# Patient Record
Sex: MALE | Race: BLACK OR AFRICAN AMERICAN | NOT HISPANIC OR LATINO | URBAN - METROPOLITAN AREA
[De-identification: names, ages, dates, MRNs, and addresses within clinical notes are randomized per-mention and may not be internally consistent; named-entity substitution may affect disease eponyms.]

---

## 2018-09-05 ENCOUNTER — EMERGENCY (EMERGENCY)
Facility: HOSPITAL | Age: 28
LOS: 1 days | Discharge: HOME | End: 2018-09-05
Attending: EMERGENCY MEDICINE | Admitting: EMERGENCY MEDICINE
Payer: COMMERCIAL

## 2018-09-05 VITALS
SYSTOLIC BLOOD PRESSURE: 130 MMHG | HEART RATE: 118 BPM | WEIGHT: 190.04 LBS | TEMPERATURE: 98 F | DIASTOLIC BLOOD PRESSURE: 81 MMHG | RESPIRATION RATE: 16 BRPM | OXYGEN SATURATION: 100 %

## 2018-09-05 VITALS
DIASTOLIC BLOOD PRESSURE: 65 MMHG | RESPIRATION RATE: 16 BRPM | HEART RATE: 96 BPM | OXYGEN SATURATION: 100 % | SYSTOLIC BLOOD PRESSURE: 111 MMHG | TEMPERATURE: 98 F

## 2018-09-05 DIAGNOSIS — R00.0 TACHYCARDIA, UNSPECIFIED: ICD-10-CM

## 2018-09-05 DIAGNOSIS — M54.5 LOW BACK PAIN: ICD-10-CM
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
## 2018-09-05 DIAGNOSIS — L98.499 NON-PRESSURE CHRONIC ULCER OF SKIN OF OTHER SITES WITH UNSPECIFIED SEVERITY: ICD-10-CM

## 2018-09-05 PROCEDURE — 99284 EMERGENCY DEPT VISIT MOD MDM: CPT

## 2018-09-05 PROCEDURE — 72131 CT LUMBAR SPINE W/O DYE: CPT | Mod: 26

## 2018-09-05 PROCEDURE — 99284 EMERGENCY DEPT VISIT MOD MDM: CPT | Mod: 25

## 2018-09-05 PROCEDURE — 72131 CT LUMBAR SPINE W/O DYE: CPT

## 2018-09-05 RX ORDER — COLLAGENASE CLOSTRIDIUM HIST. 250 UNIT/G
1 OINTMENT (GRAM) TOPICAL ONCE
Qty: 0 | Refills: 0 | Status: COMPLETED | OUTPATIENT
Start: 2018-09-05 | End: 2018-09-05

## 2018-09-05 RX ORDER — OXYCODONE AND ACETAMINOPHEN 5; 325 MG/1; MG/1
3 TABLET ORAL ONCE
Qty: 0 | Refills: 0 | Status: DISCONTINUED | OUTPATIENT
Start: 2018-09-05 | End: 2018-09-05

## 2018-09-05 RX ADMIN — Medication 1 APPLICATION(S): at 03:54

## 2018-09-05 RX ADMIN — OXYCODONE AND ACETAMINOPHEN 3 TABLET(S): 5; 325 TABLET ORAL at 03:54

## 2018-09-05 RX ADMIN — OXYCODONE AND ACETAMINOPHEN 3 TABLET(S): 5; 325 TABLET ORAL at 04:24

## 2018-09-05 NOTE — ED ADULT NURSE NOTE - NSINTERVENTIONOPT_GEN_ALL_ED
Chair Alarms/Stretcher Alarms/Hourly Rounding/Move Toilet (commode/urinal) to patient using "arms reach"/Enhanced Supervision

## 2018-09-05 NOTE — ED PROVIDER NOTE - MEDICAL DECISION MAKING DETAILS
multiple ulcers requiring wound care eval ( has no wound care team ) contact precautions given maggots noted CT study lower spine multiple ulcers requiring wound care eval ( has no wound care team ) contact precautions given maggots noted CT study lower spine-> wounds cleansed ( no maggots noted collagenases dressings + doing well in ED and plan outpatient continued care multiple ulcers requiring wound care eval ( has no wound care team ) contact precautions given maggots noted CT study lower spine-> wounds cleansed ( no maggots noted collagenases dressings + doing well in ED and plan outpatient continued care ( ct neg for fx )

## 2018-09-05 NOTE — ED ADULT NURSE REASSESSMENT NOTE - NS ED NURSE REASSESS COMMENT FT1
Labs to be held until CT is read. Wound care provided to 5 reported pressure ulcers. Pt went and returned from CT. Awaiting results. Pt was medicated for pain prior and is now resting comfortably in stretcher.
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
## 2018-09-05 NOTE — ED ADULT TRIAGE NOTE - ARRIVAL INFO ADDITIONAL COMMENTS
Pt c/o back pain and multiple buttock wound dressings falling off his wounds after falling out of his wheelchair.
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
## 2018-09-05 NOTE — ED PROVIDER NOTE - CARE PLAN
Principal Discharge DX:	Fall, initial encounter  Secondary Diagnosis:	Ulcer of sacral region, stage 4
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
## 2018-09-05 NOTE — ED PROVIDER NOTE - PHYSICAL EXAMINATION
large ulcerations malodorous and weeping albeit somewhat cared for appearance at bilateral hips/ bilateral buttocks and sacral region + appreciate age indeterminate flap related tissue from prior Sx Patient transfers well chair to bed + [place] catheter in place

## 2018-09-05 NOTE — ED ADULT NURSE NOTE - NSIMPLEMENTINTERV_GEN_ALL_ED
Implemented All Fall Risk Interventions:  [place] to call system. Call bell, personal items and telephone within reach. Instruct patient to call for assistance. Room bathroom lighting operational. Non-slip footwear when patient is off stretcher. Physically safe environment: no spills, clutter or unnecessary equipment. Stretcher in lowest position, wheels locked, appropriate side rails in place. Provide visual cue, wrist band, yellow gown, etc. Monitor gait and stability. Monitor for mental status changes and reorient to person, place, and time. Review medications for side effects contributing to fall risk. Reinforce activity limits and safety measures with patient and family.

## 2018-10-01 ENCOUNTER — INPATIENT (INPATIENT)
Facility: HOSPITAL | Age: 28
LOS: 30 days | Discharge: LEFT BEFORE DISCHARGE | DRG: 570 | End: 2018-11-01
Attending: HOSPITALIST | Admitting: HOSPITALIST
Payer: MEDICAID

## 2018-10-01 VITALS
HEART RATE: 91 BPM | DIASTOLIC BLOOD PRESSURE: 78 MMHG | RESPIRATION RATE: 16 BRPM | SYSTOLIC BLOOD PRESSURE: 124 MMHG | OXYGEN SATURATION: 97 % | TEMPERATURE: 99 F

## 2018-10-01 RX ORDER — KETOROLAC TROMETHAMINE 30 MG/ML
30 SYRINGE (ML) INJECTION ONCE
Qty: 0 | Refills: 0 | Status: DISCONTINUED | OUTPATIENT
Start: 2018-10-01 | End: 2018-10-01

## 2018-10-01 RX ORDER — OXYCODONE AND ACETAMINOPHEN 5; 325 MG/1; MG/1
2 TABLET ORAL ONCE
Qty: 0 | Refills: 0 | Status: DISCONTINUED | OUTPATIENT
Start: 2018-10-01 | End: 2018-10-01

## 2018-10-01 RX ADMIN — OXYCODONE AND ACETAMINOPHEN 2 TABLET(S): 5; 325 TABLET ORAL at 23:10
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
## 2018-10-01 NOTE — ED ADULT NURSE NOTE - OBJECTIVE STATEMENT
Patient wants to be placed into nursing facility Patient wants to be placed into nursing facility    colostomy to LLQ

## 2018-10-01 NOTE — ED PROVIDER NOTE - MEDICAL DECISION MAKING DETAILS
pt has no acute medical emergency, came in for placement, refusing workup and sw consult.   see progress note pt has chronic ulcer, lost his placement in nursing home, no acute medical emergency, came in for placement  see progress notes

## 2018-10-01 NOTE — ED PROVIDER NOTE - CARE PLAN
Principal Discharge DX:	Homeless Principal Discharge DX:	Homeless  Secondary Diagnosis:	Malingering Principal Discharge DX:	Sacral ulcer  Secondary Diagnosis:	Pain

## 2018-10-01 NOTE — ED PROVIDER NOTE - PROGRESS NOTE DETAILS
patient refusing labs. spoke with nursing manager Radha. ordered po pain mgmt multiple attempts at working up patient, he is refusing. he is refusing care. nursing manager attempted to workup patient on 5 separate occasions, patient wants po narcotics. gave 2 percocets. he is still refusing labs and workup. offered sw consult, pt declined involved nurse manager Shabana. patient now agrees to ultrasound guided. drea labs. patient signedout to me by Dr. Romero at 1am. awaiting labs for admission.  labs unremarkable. patient stable for admission for NH placement. TAHMINA Richey MD patient refusing labs. spoke with nursing manager Radha. ordered po pain mgmt. declined  consult. multiple attempts at working up patient, he is refusing. he does not allow blood draw or iv placement offered sw consult, pt declined. involved nurse manager Shabana. patient now agrees to ultrasound guided. drea labs. will screen for infection. gave empiric po abx in case ulcer infection. pt requesting large dose of opiate meds. discussed with team. patient signedout to me by Dr. Romero. awaiting labs for admission.  labs unremarkable. patient stable for admission for NH placement. endorsed to admitting team.

## 2018-10-01 NOTE — ED PROVIDER NOTE - SKIN, MLM
Skin normal color for race, warm, dry and intact. No evidence of rash. Skin normal color for race, warm, dry. stage 4 sacral ulcer.

## 2018-10-02 DIAGNOSIS — Z93.3 COLOSTOMY STATUS: Chronic | ICD-10-CM

## 2018-10-02 DIAGNOSIS — Z93.3 COLOSTOMY STATUS: ICD-10-CM

## 2018-10-02 DIAGNOSIS — D64.89 OTHER SPECIFIED ANEMIAS: ICD-10-CM

## 2018-10-02 DIAGNOSIS — E43 UNSPECIFIED SEVERE PROTEIN-CALORIE MALNUTRITION: ICD-10-CM

## 2018-10-02 DIAGNOSIS — G82.20 PARAPLEGIA, UNSPECIFIED: ICD-10-CM

## 2018-10-02 DIAGNOSIS — Z59.0 HOMELESSNESS: ICD-10-CM

## 2018-10-02 DIAGNOSIS — R30.0 DYSURIA: ICD-10-CM

## 2018-10-02 DIAGNOSIS — L89.103 PRESSURE ULCER OF UNSPECIFIED PART OF BACK, STAGE 3: ICD-10-CM

## 2018-10-02 DIAGNOSIS — L89.893 PRESSURE ULCER OF OTHER SITE, STAGE 3: ICD-10-CM

## 2018-10-02 LAB
ALBUMIN SERPL ELPH-MCNC: 3.2 G/DL — LOW (ref 3.5–5)
ALP SERPL-CCNC: 107 U/L — SIGNIFICANT CHANGE UP (ref 40–120)
ALT FLD-CCNC: 15 U/L DA — SIGNIFICANT CHANGE UP (ref 10–60)
ANION GAP SERPL CALC-SCNC: 7 MMOL/L — SIGNIFICANT CHANGE UP (ref 5–17)
AST SERPL-CCNC: 11 U/L — SIGNIFICANT CHANGE UP (ref 10–40)
BILIRUB SERPL-MCNC: 0.3 MG/DL — SIGNIFICANT CHANGE UP (ref 0.2–1.2)
BUN SERPL-MCNC: 11 MG/DL — SIGNIFICANT CHANGE UP (ref 7–18)
CALCIUM SERPL-MCNC: 9.6 MG/DL — SIGNIFICANT CHANGE UP (ref 8.4–10.5)
CHLORIDE SERPL-SCNC: 103 MMOL/L — SIGNIFICANT CHANGE UP (ref 96–108)
CO2 SERPL-SCNC: 28 MMOL/L — SIGNIFICANT CHANGE UP (ref 22–31)
CREAT SERPL-MCNC: 0.67 MG/DL — SIGNIFICANT CHANGE UP (ref 0.5–1.3)
GLUCOSE SERPL-MCNC: 102 MG/DL — HIGH (ref 70–99)
HCT VFR BLD CALC: 35.2 % — LOW (ref 39–50)
HGB BLD-MCNC: 10.8 G/DL — LOW (ref 13–17)
MCHC RBC-ENTMCNC: 24 PG — LOW (ref 27–34)
MCHC RBC-ENTMCNC: 30.5 GM/DL — LOW (ref 32–36)
MCV RBC AUTO: 78.6 FL — LOW (ref 80–100)
PLATELET # BLD AUTO: 267 K/UL — SIGNIFICANT CHANGE UP (ref 150–400)
POTASSIUM SERPL-MCNC: 4.4 MMOL/L — SIGNIFICANT CHANGE UP (ref 3.5–5.3)
POTASSIUM SERPL-SCNC: 4.4 MMOL/L — SIGNIFICANT CHANGE UP (ref 3.5–5.3)
PROT SERPL-MCNC: 10 G/DL — HIGH (ref 6–8.3)
RBC # BLD: 4.48 M/UL — SIGNIFICANT CHANGE UP (ref 4.2–5.8)
RBC # FLD: 17.6 % — HIGH (ref 10.3–14.5)
SODIUM SERPL-SCNC: 138 MMOL/L — SIGNIFICANT CHANGE UP (ref 135–145)
WBC # BLD: 5.9 K/UL — SIGNIFICANT CHANGE UP (ref 3.8–10.5)
WBC # FLD AUTO: 5.9 K/UL — SIGNIFICANT CHANGE UP (ref 3.8–10.5)

## 2018-10-02 PROCEDURE — 99222 1ST HOSP IP/OBS MODERATE 55: CPT

## 2018-10-02 PROCEDURE — 99284 EMERGENCY DEPT VISIT MOD MDM: CPT

## 2018-10-02 RX ORDER — OXYCODONE HYDROCHLORIDE 5 MG/1
15 TABLET ORAL EVERY 8 HOURS
Qty: 0 | Refills: 0 | Status: DISCONTINUED | OUTPATIENT
Start: 2018-10-02 | End: 2018-10-02

## 2018-10-02 RX ORDER — ONDANSETRON 8 MG/1
4 TABLET, FILM COATED ORAL EVERY 6 HOURS
Qty: 0 | Refills: 0 | Status: DISCONTINUED | OUTPATIENT
Start: 2018-10-02 | End: 2018-11-01

## 2018-10-02 RX ORDER — SENNA PLUS 8.6 MG/1
2 TABLET ORAL AT BEDTIME
Qty: 0 | Refills: 0 | Status: DISCONTINUED | OUTPATIENT
Start: 2018-10-02 | End: 2018-10-11

## 2018-10-02 RX ORDER — OXYCODONE HYDROCHLORIDE 5 MG/1
15 TABLET ORAL EVERY 8 HOURS
Qty: 0 | Refills: 0 | Status: DISCONTINUED | OUTPATIENT
Start: 2018-10-02 | End: 2018-10-09

## 2018-10-02 RX ORDER — BACLOFEN 100 %
20 POWDER (GRAM) MISCELLANEOUS
Qty: 0 | Refills: 0 | Status: DISCONTINUED | OUTPATIENT
Start: 2018-10-02 | End: 2018-11-01

## 2018-10-02 RX ORDER — ENOXAPARIN SODIUM 100 MG/ML
40 INJECTION SUBCUTANEOUS EVERY 24 HOURS
Qty: 0 | Refills: 0 | Status: DISCONTINUED | OUTPATIENT
Start: 2018-10-02 | End: 2018-11-01

## 2018-10-02 RX ORDER — TRAMADOL HYDROCHLORIDE 50 MG/1
50 TABLET ORAL
Qty: 0 | Refills: 0 | Status: DISCONTINUED | OUTPATIENT
Start: 2018-10-02 | End: 2018-10-03

## 2018-10-02 RX ORDER — CEPHALEXIN 500 MG
500 CAPSULE ORAL ONCE
Qty: 0 | Refills: 0 | Status: COMPLETED | OUTPATIENT
Start: 2018-10-02 | End: 2018-10-02

## 2018-10-02 RX ADMIN — Medication 1 TABLET(S): at 02:41

## 2018-10-02 RX ADMIN — OXYCODONE HYDROCHLORIDE 15 MILLIGRAM(S): 5 TABLET ORAL at 15:57

## 2018-10-02 RX ADMIN — TRAMADOL HYDROCHLORIDE 50 MILLIGRAM(S): 50 TABLET ORAL at 14:24

## 2018-10-02 RX ADMIN — Medication 500 MILLIGRAM(S): at 02:41

## 2018-10-02 RX ADMIN — OXYCODONE AND ACETAMINOPHEN 2 TABLET(S): 5; 325 TABLET ORAL at 01:43

## 2018-10-02 RX ADMIN — OXYCODONE HYDROCHLORIDE 15 MILLIGRAM(S): 5 TABLET ORAL at 16:57

## 2018-10-02 RX ADMIN — ENOXAPARIN SODIUM 40 MILLIGRAM(S): 100 INJECTION SUBCUTANEOUS at 07:03

## 2018-10-02 RX ADMIN — Medication 2 MILLIGRAM(S): at 14:54

## 2018-10-02 SDOH — ECONOMIC STABILITY - HOUSING INSECURITY: HOMELESSNESS: Z59.0
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
## 2018-10-02 NOTE — H&P ADULT - NSHPLABSRESULTS_GEN_ALL_CORE
10.8   5.9   )-----------( 267      ( 02 Oct 2018 01:45 )             35.2     10-02    138  |  103  |  11  ----------------------------<  102<H>  4.4   |  28  |  0.67    Ca    9.6      02 Oct 2018 01:45    TPro  10.0<H>  /  Alb  3.2<L>  /  TBili  0.3  /  DBili  x   /  AST  11  /  ALT  15  /  AlkPhos  107  10-02

## 2018-10-02 NOTE — H&P ADULT - NSHPREVIEWOFSYSTEMS_GEN_ALL_CORE
REVIEW OF SYSTEMS  General:	Negative  Skin: Negative   Ophthalmologic: Negative   ENMT:Negative   Respiratory and Thorax:Negative   Cardiovascular:Negative   Gastrointestinal:Negative   Genitourinary:+dysuria 	  Musculoskeletal:+pain in back and LE   Neurological:Negative   Psychiatric:Negative   Hematology/Lymphatics:Negative   Endocrine:Negative 	  Allergic/Immunologic:Negative

## 2018-10-02 NOTE — H&P ADULT - PROBLEM SELECTOR PLAN 1
Appreciate case management assistance. Patient with poor social support and will need assistance with disposition.

## 2018-10-02 NOTE — H&P ADULT - NSHPPHYSICALEXAM_GEN_ALL_CORE
PHYSICAL EXAM:    Vital Signs Last 24 Hrs  T(C): 37.1 (01 Oct 2018 18:19), Max: 37.1 (01 Oct 2018 18:19)  T(F): 98.8 (01 Oct 2018 18:19), Max: 98.8 (01 Oct 2018 18:19)  HR: 91 (01 Oct 2018 18:19) (91 - 91)  BP: 124/78 (01 Oct 2018 18:19) (124/78 - 124/78)  BP(mean): --  RR: 16 (01 Oct 2018 18:19) (16 - 16)  SpO2: 97% (01 Oct 2018 18:19) (97% - 97%)    GEN: NAD  HEENT- normocephalic; mouth moist  CVS- S1S2+  LUNGS- clear to auscultation; no wheezing  ABD: Soft , nontender, nondistended, Bowel sounds are present +ostomy with pink stoma, no surrounding tenderness   EXTREMITY: no calf tenderness, no cyanosis, no edema +contractures   NEURO: AAOx3; +paraplegia of lower extremeties, cranial nerves grossly intact  PSYCH: normal affect and behavior  BACK: Multiple wounds with well healed surgical scars over sacrum, open wounds with granulation tissue present. No fluctuance, crepitus, purulent drainage. B/L calcaneal wounds with dressing   VASCULAR: + distal peripheral pulses
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
## 2018-10-02 NOTE — H&P ADULT - NSHPSOCIALHISTORY_GEN_ALL_CORE
Homeless, was living in NH. Paraplegic and wheelchair bound. Denies alcohol, tobacco or illicit drug use.

## 2018-10-03 DIAGNOSIS — M54.9 DORSALGIA, UNSPECIFIED: ICD-10-CM

## 2018-10-03 DIAGNOSIS — L89.90 PRESSURE ULCER OF UNSPECIFIED SITE, UNSPECIFIED STAGE: ICD-10-CM

## 2018-10-03 DIAGNOSIS — Z29.9 ENCOUNTER FOR PROPHYLACTIC MEASURES, UNSPECIFIED: ICD-10-CM

## 2018-10-03 LAB
ALBUMIN SERPL ELPH-MCNC: 2.7 G/DL — LOW (ref 3.5–5)
ALP SERPL-CCNC: 90 U/L — SIGNIFICANT CHANGE UP (ref 40–120)
ALT FLD-CCNC: 15 U/L DA — SIGNIFICANT CHANGE UP (ref 10–60)
ANION GAP SERPL CALC-SCNC: 9 MMOL/L — SIGNIFICANT CHANGE UP (ref 5–17)
APPEARANCE UR: ABNORMAL
AST SERPL-CCNC: 12 U/L — SIGNIFICANT CHANGE UP (ref 10–40)
BASOPHILS # BLD AUTO: 0.1 K/UL — SIGNIFICANT CHANGE UP (ref 0–0.2)
BASOPHILS NFR BLD AUTO: 1.7 % — SIGNIFICANT CHANGE UP (ref 0–2)
BILIRUB SERPL-MCNC: 0.3 MG/DL — SIGNIFICANT CHANGE UP (ref 0.2–1.2)
BILIRUB UR-MCNC: NEGATIVE — SIGNIFICANT CHANGE UP
BUN SERPL-MCNC: 11 MG/DL — SIGNIFICANT CHANGE UP (ref 7–18)
CALCIUM SERPL-MCNC: 8.7 MG/DL — SIGNIFICANT CHANGE UP (ref 8.4–10.5)
CHLORIDE SERPL-SCNC: 103 MMOL/L — SIGNIFICANT CHANGE UP (ref 96–108)
CHOLEST SERPL-MCNC: 126 MG/DL — SIGNIFICANT CHANGE UP (ref 10–199)
CO2 SERPL-SCNC: 23 MMOL/L — SIGNIFICANT CHANGE UP (ref 22–31)
COLOR SPEC: YELLOW — SIGNIFICANT CHANGE UP
CREAT SERPL-MCNC: 0.68 MG/DL — SIGNIFICANT CHANGE UP (ref 0.5–1.3)
DIFF PNL FLD: ABNORMAL
EOSINOPHIL # BLD AUTO: 0.2 K/UL — SIGNIFICANT CHANGE UP (ref 0–0.5)
EOSINOPHIL NFR BLD AUTO: 4 % — SIGNIFICANT CHANGE UP (ref 0–6)
GLUCOSE SERPL-MCNC: 109 MG/DL — HIGH (ref 70–99)
GLUCOSE UR QL: NEGATIVE — SIGNIFICANT CHANGE UP
HBA1C BLD-MCNC: 5.1 % — SIGNIFICANT CHANGE UP (ref 4–5.6)
HCT VFR BLD CALC: 34.2 % — LOW (ref 39–50)
HDLC SERPL-MCNC: 50 MG/DL — SIGNIFICANT CHANGE UP
HGB BLD-MCNC: 10.4 G/DL — LOW (ref 13–17)
KETONES UR-MCNC: NEGATIVE — SIGNIFICANT CHANGE UP
LEUKOCYTE ESTERASE UR-ACNC: ABNORMAL
LIPID PNL WITH DIRECT LDL SERPL: 67 MG/DL — SIGNIFICANT CHANGE UP
LYMPHOCYTES # BLD AUTO: 1.4 K/UL — SIGNIFICANT CHANGE UP (ref 1–3.3)
LYMPHOCYTES # BLD AUTO: 26.5 % — SIGNIFICANT CHANGE UP (ref 13–44)
MAGNESIUM SERPL-MCNC: 1.9 MG/DL — SIGNIFICANT CHANGE UP (ref 1.6–2.6)
MCHC RBC-ENTMCNC: 24.2 PG — LOW (ref 27–34)
MCHC RBC-ENTMCNC: 30.6 GM/DL — LOW (ref 32–36)
MCV RBC AUTO: 79.3 FL — LOW (ref 80–100)
MONOCYTES # BLD AUTO: 0.6 K/UL — SIGNIFICANT CHANGE UP (ref 0–0.9)
MONOCYTES NFR BLD AUTO: 11 % — SIGNIFICANT CHANGE UP (ref 2–14)
NEUTROPHILS # BLD AUTO: 3 K/UL — SIGNIFICANT CHANGE UP (ref 1.8–7.4)
NEUTROPHILS NFR BLD AUTO: 56.8 % — SIGNIFICANT CHANGE UP (ref 43–77)
NITRITE UR-MCNC: POSITIVE
PCP SPEC-MCNC: SIGNIFICANT CHANGE UP
PH UR: 8 — SIGNIFICANT CHANGE UP (ref 5–8)
PHOSPHATE SERPL-MCNC: 3.2 MG/DL — SIGNIFICANT CHANGE UP (ref 2.5–4.5)
PLATELET # BLD AUTO: 248 K/UL — SIGNIFICANT CHANGE UP (ref 150–400)
POTASSIUM SERPL-MCNC: 3.9 MMOL/L — SIGNIFICANT CHANGE UP (ref 3.5–5.3)
POTASSIUM SERPL-SCNC: 3.9 MMOL/L — SIGNIFICANT CHANGE UP (ref 3.5–5.3)
PROT SERPL-MCNC: 8.9 G/DL — HIGH (ref 6–8.3)
PROT UR-MCNC: 30 MG/DL
RBC # BLD: 4.31 M/UL — SIGNIFICANT CHANGE UP (ref 4.2–5.8)
RBC # FLD: 17.6 % — HIGH (ref 10.3–14.5)
SODIUM SERPL-SCNC: 135 MMOL/L — SIGNIFICANT CHANGE UP (ref 135–145)
SP GR SPEC: 1.01 — SIGNIFICANT CHANGE UP (ref 1.01–1.02)
TOTAL CHOLESTEROL/HDL RATIO MEASUREMENT: 2.5 RATIO — LOW (ref 3.4–9.6)
TRIGL SERPL-MCNC: 45 MG/DL — SIGNIFICANT CHANGE UP (ref 10–149)
TSH SERPL-MCNC: 1.17 UU/ML — SIGNIFICANT CHANGE UP (ref 0.34–4.82)
UROBILINOGEN FLD QL: NEGATIVE — SIGNIFICANT CHANGE UP
VIT B12 SERPL-MCNC: 805 PG/ML — SIGNIFICANT CHANGE UP (ref 232–1245)
WBC # BLD: 5.3 K/UL — SIGNIFICANT CHANGE UP (ref 3.8–10.5)
WBC # FLD AUTO: 5.3 K/UL — SIGNIFICANT CHANGE UP (ref 3.8–10.5)

## 2018-10-03 PROCEDURE — 99232 SBSQ HOSP IP/OBS MODERATE 35: CPT | Mod: GC

## 2018-10-03 PROCEDURE — 99223 1ST HOSP IP/OBS HIGH 75: CPT

## 2018-10-03 RX ORDER — ZINC SULFATE TAB 220 MG (50 MG ZINC EQUIVALENT) 220 (50 ZN) MG
220 TAB ORAL DAILY
Qty: 0 | Refills: 0 | Status: DISCONTINUED | OUTPATIENT
Start: 2018-10-03 | End: 2018-11-01

## 2018-10-03 RX ORDER — FOLIC ACID 0.8 MG
1 TABLET ORAL DAILY
Qty: 0 | Refills: 0 | Status: DISCONTINUED | OUTPATIENT
Start: 2018-10-03 | End: 2018-11-01

## 2018-10-03 RX ADMIN — OXYCODONE HYDROCHLORIDE 15 MILLIGRAM(S): 5 TABLET ORAL at 23:39

## 2018-10-03 RX ADMIN — Medication 1 MILLIGRAM(S): at 12:41

## 2018-10-03 RX ADMIN — ZINC SULFATE TAB 220 MG (50 MG ZINC EQUIVALENT) 220 MILLIGRAM(S): 220 (50 ZN) TAB at 12:41

## 2018-10-03 RX ADMIN — Medication 1 TABLET(S): at 12:41

## 2018-10-03 RX ADMIN — OXYCODONE HYDROCHLORIDE 15 MILLIGRAM(S): 5 TABLET ORAL at 07:18

## 2018-10-03 RX ADMIN — OXYCODONE HYDROCHLORIDE 15 MILLIGRAM(S): 5 TABLET ORAL at 06:16

## 2018-10-03 RX ADMIN — OXYCODONE HYDROCHLORIDE 15 MILLIGRAM(S): 5 TABLET ORAL at 16:00

## 2018-10-03 RX ADMIN — OXYCODONE HYDROCHLORIDE 15 MILLIGRAM(S): 5 TABLET ORAL at 15:21

## 2018-10-03 NOTE — CONSULT NOTE ADULT - PROBLEM SELECTOR RECOMMENDATION 9
paraplegia, chronic wounds/decubiti ulcers   - oxycodone 15mg q 8 hrs as per outpatient regimen   - baclofen 20 mg prn for spasms  - turn and position  - wound care   - pending urinalysis and ucx

## 2018-10-03 NOTE — DIETITIAN INITIAL EVALUATION ADULT. - SIGNS/SYMPTOMS
Poor oral intake >2wks PTA, 10.25% wt. loss >2months, multiple pressure ulcers & poor social support
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
## 2018-10-03 NOTE — PROGRESS NOTE ADULT - PROBLEM SELECTOR PLAN 3
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 Ischium (1cm x 3cm x 0.4cm) with pink tissue, drainage, and white wound edges  Stage 4 Pressure Injury to the Sacrum (5cm x 7cm x 1cm) with pink tissue, bone, drainage, white wound edges, and undermining (up to 3cm at 1-8 o'clock)  Stage 4 Pressure Injury to the R. Ischium (3cm x 1cm x 1cm) with granulation tissue, bone, drainage, white wound edges, and undermining (up to 5cm at 360 degrees  Stage 3 Pressure Injury to the R. Hip (9cm x 6cm) with granulation tissue, pink tissue, drainage, and white wound edges	  Bilateral Lower Extremities Ulcerations, to which the patient is being followed by Podiatry with a treatment plan to be formulated to address these issues  podiatry consult Will need ostomy bag, wound care

## 2018-10-03 NOTE — PROGRESS NOTE ADULT - PROBLEM SELECTOR PLAN 7
Microcytic-No overt blood loss, IMPROVE VTE Individual Risk Assessment          RISK                                                          Points  [  ] Previous VTE                                                3  [  ] Thrombophilia                                             2  [ x ] Lower limb paralysis                                   2        (unable to hold up >15 seconds)    [  ] Current Cancer                                             2         (within 6 months)  [x  ] Immobilization > 24 hrs                              1  [  ] ICU/CCU stay > 24 hours                             1  [  ] Age > 60                                                         1    IMPROVE VTE Score: 3, dvt ppx
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
## 2018-10-03 NOTE — PROGRESS NOTE ADULT - SUBJECTIVE AND OBJECTIVE BOX
Patient is a 28y old  Male who presents with a chief complaint of Pain of sacral and heel wounds (02 Oct 2018 06:45)    Patient was seen and examined, no complaints, waiting for placement. Refusing UA.    INTERVAL HPI/OVERNIGHT EVENTS:  T(C): 37.1 (10-03-18 @ 05:16), Max: 37.3 (10-02-18 @ 17:09)  HR: 87 (10-03-18 @ 05:16) (87 - 96)  BP: 104/68 (10-03-18 @ 05:16) (104/68 - 115/58)  RR: 18 (10-03-18 @ 05:16) (18 - 18)  SpO2: 100% (10-03-18 @ 05:16) (100% - 100%)    MEDICATIONS  (STANDING):  enoxaparin Injectable 40 milliGRAM(s) SubCutaneous every 24 hours    MEDICATIONS  (PRN):  baclofen 20 milliGRAM(s) Oral two times a day PRN Muscle Spasm  ondansetron Injectable 4 milliGRAM(s) IV Push every 6 hours PRN Nausea  oxyCODONE    IR 15 milliGRAM(s) Oral every 8 hours PRN Severe Pain (7 - 10)  senna 2 Tablet(s) Oral at bedtime PRN Constipation      PHYSICAL EXAM:  GENERAL: NAD, well-groomed, well-developed  HEAD:  Atraumatic, Normocephalic  EYES: EOMI, PERRLA, conjunctiva and sclera clear  ENMT: No tonsillar erythema, exudates, or enlargement; Moist mucous membranes, Good dentition, No lesions  NECK: Supple, No JVD, Normal thyroid  NERVOUS SYSTEM:  Alert & Oriented X3, Good concentration; b/l LE paraplegia  CHEST/LUNG: Clear to percussion bilaterally; No rales, rhonchi, wheezing, or rubs  HEART: Regular rate and rhythm; No murmurs, rubs, or gallops  ABDOMEN: Soft, Nontender, Nondistended; Bowel sounds present  EXTREMITIES:  2+ Peripheral Pulses, No clubbing, cyanosis, or edema  LYMPH: No lymphadenopathy noted  SKIN: multiple stage 4 ulcers over lower back and hip and b/l LE    LABS:                        10.4   5.3   )-----------( 248      ( 03 Oct 2018 07:46 )             34.2     10-03    135  |  103  |  11  ----------------------------<  109<H>  3.9   |  23  |  0.68    Ca    8.7      03 Oct 2018 08:34  Phos  3.2     10-03  Mg     1.9     10-03    TPro  8.9<H>  /  Alb  2.7<L>  /  TBili  0.3  /  DBili  x   /  AST  12  /  ALT  15  /  AlkPhos  90  10-03

## 2018-10-03 NOTE — ADVANCED PRACTICE NURSE CONSULT - ASSESSMENT
This is a 28yr old male patient admitted for Homelessness, presenting with the following:  -There is a Stage 4 Pressure Injury to the L. Hip (9cm x 7cm x 1cm) with granulation tissue, bone, drainage, white wound edges, and undermining (up to 2cm at 6-12 o'clock)  -There is a Stage 4 Pressure Injury to the L. Ischium (1cm x 3cm x 0.4cm) with pink tissue, drainage, and white wound edges  -There is a Stage 4 Pressure Injury to the Sacrum (5cm x 7cm x 1cm) with pink tissue, bone, drainage, white wound edges, and undermining (up to 3cm at 1-8 o'clock)  -There is a Stage 4 Pressure Injury to the R. Ischium (3cm x 1cm x 1cm) with granulation tissue, bone, drainage, white wound edges, and undermining (up to 5cm at 360 degrees  --There is a Stage 3 Pressure Injury to the R. Hip (9cm x 6cm) with granulation tissue, pink tissue, drainage, and white wound edges	  -There are Bilateral Lower Extremities Ulcerations, to which the patient is being followed by Podiatry with a treatment plan to be formulated to address these issues

## 2018-10-03 NOTE — DIETITIAN INITIAL EVALUATION ADULT. - NUTRITION INTERVENTION
Medical Food Supplements/Vitamin/Mineral/Collaboration and Referral of Nutrition Care/Feeding Assistance/Meals and Snack

## 2018-10-03 NOTE — DIETITIAN INITIAL EVALUATION ADULT. - PERTINENT LABORATORY DATA
10-03 Na135 mmol/L Glu 109 mg/dL<H> K+ 3.9 mmol/L Cr  0.68 mg/dL BUN 11 mg/dL 10-03 Phos 3.2 mg/dL 10-03 Alb 2.7 g/dL<L> 10-03 Chol 126 mg/dL LDL 67 mg/dL HDL 50 mg/dL Trig 45 mg/dL
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
## 2018-10-03 NOTE — DIETITIAN INITIAL EVALUATION ADULT. - FACTORS AFF FOOD INTAKE
weakness, paraplegia, colostomy, decubitus ulcers, anemia, homeless/pain/difficulty with food procurement/preparation/other (specify)

## 2018-10-03 NOTE — DIETITIAN INITIAL EVALUATION ADULT. - MD RECOMMEND
Add 1 can Ensure Enlive TID (1050 Kcal, Protein 60 grams) to regular diet, as medically/po supplement Add 1 can Ensure Enlive TID (1050 Kcal, Protein 60 grams) & MVI/minerals/Vit C 500mg BID/ZnSO 220mg once daily for wound healing to regular diet, as medically feasible/po supplement

## 2018-10-03 NOTE — PROGRESS NOTE ADULT - PROBLEM SELECTOR PLAN 2
Baclofen for muscle spasms 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 Ischium (1cm x 3cm x 0.4cm) with pink tissue, drainage, and white wound edges  Stage 4 Pressure Injury to the Sacrum (5cm x 7cm x 1cm) with pink tissue, bone, drainage, white wound edges, and undermining (up to 3cm at 1-8 o'clock)  Stage 4 Pressure Injury to the R. Ischium (3cm x 1cm x 1cm) with granulation tissue, bone, drainage, white wound edges, and undermining (up to 5cm at 360 degrees  Stage 3 Pressure Injury to the R. Hip (9cm x 6cm) with granulation tissue, pink tissue, drainage, and white wound edges	  Bilateral Lower Extremities Ulcerations, to which the patient is being followed by Podiatry with a treatment plan to be formulated to address these issues  podiatry consult

## 2018-10-03 NOTE — ADVANCED PRACTICE NURSE CONSULT - RECOMMEDATIONS
-Clean all wounds with normal saline and apply skin prep to the surrounding skin  -Pack all wounds with Silver Calcium Alginate (Aquacel Ag) and cover with a Foam dressing Q 72hrs PRN  -Encourage the patient to reposition Q 2hrs using wedges or pillows

## 2018-10-03 NOTE — CHART NOTE - NSCHARTNOTEFT_GEN_A_CORE
Upon Nutritional Assessment by the Registered Dietitian your patient was determined to meet criteria / has evidence of the following diagnosis/diagnoses:          [ ]  Mild Protein Calorie Malnutrition        [ ]  Moderate Protein Calorie Malnutrition        [X ] Severe Protein Calorie Malnutrition        [ ] Unspecified Protein Calorie Malnutrition        [ ] Underweight / BMI <19        [ ] Morbid Obesity / BMI > 40      Findings as based on:  •  Comprehensive nutrition assessment and consultation  •  Calorie counts (nutrient intake analysis)  •  Food acceptance and intake status from observations by staff  •  Follow up  •  Patient education  •  Intervention secondary to interdisciplinary rounds  •   concerns      Treatment:    The following diet has been recommended:      PROVIDER Section:     By signing this assessment you are acknowledging and agree with the diagnosis/diagnoses assigned by the Registered Dietitian    Comments:  Add Ensure Enlive 1 can TID (1050 Kcal, 60 grams) with MVI/minerals/Vit.C 500mg/ZnSO 220mg for wound healing as medically feasible.

## 2018-10-04 PROCEDURE — 99232 SBSQ HOSP IP/OBS MODERATE 35: CPT | Mod: GC

## 2018-10-04 RX ORDER — CEFTRIAXONE 500 MG/1
INJECTION, POWDER, FOR SOLUTION INTRAMUSCULAR; INTRAVENOUS
Qty: 0 | Refills: 0 | Status: DISCONTINUED | OUTPATIENT
Start: 2018-10-04 | End: 2018-10-08

## 2018-10-04 RX ORDER — CEFTRIAXONE 500 MG/1
1 INJECTION, POWDER, FOR SOLUTION INTRAMUSCULAR; INTRAVENOUS ONCE
Qty: 0 | Refills: 0 | Status: COMPLETED | OUTPATIENT
Start: 2018-10-04 | End: 2018-10-04

## 2018-10-04 RX ORDER — CEFTRIAXONE 500 MG/1
1 INJECTION, POWDER, FOR SOLUTION INTRAMUSCULAR; INTRAVENOUS EVERY 24 HOURS
Qty: 0 | Refills: 0 | Status: DISCONTINUED | OUTPATIENT
Start: 2018-10-05 | End: 2018-10-08

## 2018-10-04 RX ADMIN — Medication 1 MILLIGRAM(S): at 12:15

## 2018-10-04 RX ADMIN — OXYCODONE HYDROCHLORIDE 15 MILLIGRAM(S): 5 TABLET ORAL at 09:39

## 2018-10-04 RX ADMIN — OXYCODONE HYDROCHLORIDE 15 MILLIGRAM(S): 5 TABLET ORAL at 19:09

## 2018-10-04 RX ADMIN — Medication 1 TABLET(S): at 12:15

## 2018-10-04 RX ADMIN — OXYCODONE HYDROCHLORIDE 15 MILLIGRAM(S): 5 TABLET ORAL at 00:09

## 2018-10-04 RX ADMIN — CEFTRIAXONE 100 GRAM(S): 500 INJECTION, POWDER, FOR SOLUTION INTRAMUSCULAR; INTRAVENOUS at 12:15

## 2018-10-04 RX ADMIN — ENOXAPARIN SODIUM 40 MILLIGRAM(S): 100 INJECTION SUBCUTANEOUS at 09:39

## 2018-10-04 RX ADMIN — ZINC SULFATE TAB 220 MG (50 MG ZINC EQUIVALENT) 220 MILLIGRAM(S): 220 (50 ZN) TAB at 12:15

## 2018-10-04 RX ADMIN — OXYCODONE HYDROCHLORIDE 15 MILLIGRAM(S): 5 TABLET ORAL at 17:46

## 2018-10-04 RX ADMIN — OXYCODONE HYDROCHLORIDE 15 MILLIGRAM(S): 5 TABLET ORAL at 10:15

## 2018-10-04 NOTE — PROGRESS NOTE ADULT - SUBJECTIVE AND OBJECTIVE BOX
Patient is a 28y old  Male who presents with a chief complaint of Pain of sacral and heel wounds (03 Oct 2018 11:31)    Patient was seen and examined, no complaints. +UA overnight, started abx.    INTERVAL HPI/OVERNIGHT EVENTS:  T(C): 36.7 (10-04-18 @ 05:11), Max: 36.9 (10-03-18 @ 15:08)  HR: 82 (10-04-18 @ 10:33) (69 - 89)  BP: 98/50 (10-04-18 @ 10:33) (98/50 - 122/72)  RR: 18 (10-04-18 @ 05:11) (17 - 18)  SpO2: 100% (10-04-18 @ 05:11) (100% - 100%)  Wt(kg): --  I&O's Summary    03 Oct 2018 07:01  -  04 Oct 2018 07:00  --------------------------------------------------------  IN: 0 mL / OUT: 1000 mL / NET: -1000 mL    MEDICATIONS  (STANDING):  cefTRIAXone   IVPB      cefTRIAXone   IVPB 1 Gram(s) IV Intermittent once  enoxaparin Injectable 40 milliGRAM(s) SubCutaneous every 24 hours  folic acid 1 milliGRAM(s) Oral daily  multivitamin 1 Tablet(s) Oral daily  zinc sulfate 220 milliGRAM(s) Oral daily    MEDICATIONS  (PRN):  baclofen 20 milliGRAM(s) Oral two times a day PRN Muscle Spasm  ondansetron Injectable 4 milliGRAM(s) IV Push every 6 hours PRN Nausea  oxyCODONE    IR 15 milliGRAM(s) Oral every 8 hours PRN Severe Pain (7 - 10)  senna 2 Tablet(s) Oral at bedtime PRN Constipation    PHYSICAL EXAM:  GENERAL: NAD, well-groomed, well-developed  HEAD:  Atraumatic, Normocephalic  EYES: EOMI, PERRLA, conjunctiva and sclera clear  ENMT: No tonsillar erythema, exudates, or enlargement; Moist mucous membranes, Good dentition, No lesions  NECK: Supple, No JVD, Normal thyroid  NERVOUS SYSTEM:  Alert & Oriented X3, Good concentration; b/l LE paraplegia  CHEST/LUNG: Clear to percussion bilaterally; No rales, rhonchi, wheezing, or rubs  HEART: Regular rate and rhythm; No murmurs, rubs, or gallops  ABDOMEN: Soft, Nontender, Nondistended; Bowel sounds present  EXTREMITIES:  2+ Peripheral Pulses, No clubbing, cyanosis, or edema  LYMPH: No lymphadenopathy noted  SKIN: multiple stage 4 ulcers over lower back and hip and b/l LE    LABS:                        10.4   5.3   )-----------( 248      ( 03 Oct 2018 07:46 )             34.2     10-    135  |  103  |  11  ----------------------------<  109<H>  3.9   |  23  |  0.68    Ca    8.7      03 Oct 2018 08:34  Phos  3.2     10-  Mg     1.9     10-    TPro  8.9<H>  /  Alb  2.7<L>  /  TBili  0.3  /  DBili  x   /  AST  12  /  ALT  15  /  AlkPhos  90  10-03    Urinalysis Basic - ( 03 Oct 2018 21:17 )    Color: Yellow / Appearance: Slightly Turbid / S.010 / pH: x  Gluc: x / Ketone: Negative  / Bili: Negative / Urobili: Negative   Blood: x / Protein: 30 mg/dL / Nitrite: Positive   Leuk Esterase: Moderate / RBC: 5-10 /HPF / WBC 11-25 /HPF   Sq Epi: x / Non Sq Epi: Few /HPF / Bacteria: Many /HPF
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
## 2018-10-04 NOTE — PROGRESS NOTE ADULT - PROBLEM SELECTOR PLAN 7
IMPROVE VTE Individual Risk Assessment          RISK                                                          Points  [  ] Previous VTE                                                3  [  ] Thrombophilia                                             2  [ x ] Lower limb paralysis                                   2        (unable to hold up >15 seconds)    [  ] Current Cancer                                             2         (within 6 months)  [x  ] Immobilization > 24 hrs                              1  [  ] ICU/CCU stay > 24 hours                             1  [  ] Age > 60                                                         1    IMPROVE VTE Score: 3, dvt ppx
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
## 2018-10-04 NOTE — PROGRESS NOTE ADULT - PROBLEM SELECTOR PLAN 2
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 Ischium (1cm x 3cm x 0.4cm) with pink tissue, drainage, and white wound edges  Stage 4 Pressure Injury to the Sacrum (5cm x 7cm x 1cm) with pink tissue, bone, drainage, white wound edges, and undermining (up to 3cm at 1-8 o'clock)  Stage 4 Pressure Injury to the R. Ischium (3cm x 1cm x 1cm) with granulation tissue, bone, drainage, white wound edges, and undermining (up to 5cm at 360 degrees  Stage 3 Pressure Injury to the R. Hip (9cm x 6cm) with granulation tissue, pink tissue, drainage, and white wound edges	  Bilateral Lower Extremities Ulcerations, to which the patient is being followed by Podiatry with a treatment plan to be formulated to address these issues  podiatry consult  surgery consult pending

## 2018-10-04 NOTE — PHYSICAL THERAPY INITIAL EVALUATION ADULT - IMPAIRMENTS CONTRIBUTING IMPAIRED BED MOBILITY, REHAB EVAL
decreased flexibility/impaired motor control/decreased strength/impaired postural control/impaired balance

## 2018-10-04 NOTE — PHYSICAL THERAPY INITIAL EVALUATION ADULT - GENERAL OBSERVATIONS, REHAB EVAL
pt aox4 pleasant, paraplegic colostomy wilson cath, LE limitations of motion, fairly independent long sitting activities

## 2018-10-05 LAB
CULTURE RESULTS: SIGNIFICANT CHANGE UP
SPECIMEN SOURCE: SIGNIFICANT CHANGE UP

## 2018-10-05 PROCEDURE — 99232 SBSQ HOSP IP/OBS MODERATE 35: CPT | Mod: GC

## 2018-10-05 RX ORDER — OXYCODONE HYDROCHLORIDE 5 MG/1
1 TABLET ORAL
Qty: 0 | Refills: 0 | COMMUNITY
Start: 2018-10-05

## 2018-10-05 RX ORDER — ZINC SULFATE TAB 220 MG (50 MG ZINC EQUIVALENT) 220 (50 ZN) MG
1 TAB ORAL
Qty: 0 | Refills: 0 | COMMUNITY
Start: 2018-10-05

## 2018-10-05 RX ORDER — BACLOFEN 100 %
1 POWDER (GRAM) MISCELLANEOUS
Qty: 0 | Refills: 0 | COMMUNITY
Start: 2018-10-05

## 2018-10-05 RX ORDER — SENNA PLUS 8.6 MG/1
2 TABLET ORAL
Qty: 0 | Refills: 0 | COMMUNITY
Start: 2018-10-05

## 2018-10-05 RX ORDER — FOLIC ACID 0.8 MG
1 TABLET ORAL
Qty: 0 | Refills: 0 | COMMUNITY
Start: 2018-10-05

## 2018-10-05 RX ADMIN — CEFTRIAXONE 100 GRAM(S): 500 INJECTION, POWDER, FOR SOLUTION INTRAMUSCULAR; INTRAVENOUS at 10:31

## 2018-10-05 RX ADMIN — OXYCODONE HYDROCHLORIDE 15 MILLIGRAM(S): 5 TABLET ORAL at 01:53

## 2018-10-05 RX ADMIN — ENOXAPARIN SODIUM 40 MILLIGRAM(S): 100 INJECTION SUBCUTANEOUS at 10:31

## 2018-10-05 RX ADMIN — OXYCODONE HYDROCHLORIDE 15 MILLIGRAM(S): 5 TABLET ORAL at 19:30

## 2018-10-05 RX ADMIN — OXYCODONE HYDROCHLORIDE 15 MILLIGRAM(S): 5 TABLET ORAL at 10:29

## 2018-10-05 RX ADMIN — OXYCODONE HYDROCHLORIDE 15 MILLIGRAM(S): 5 TABLET ORAL at 18:36

## 2018-10-05 RX ADMIN — OXYCODONE HYDROCHLORIDE 15 MILLIGRAM(S): 5 TABLET ORAL at 12:20

## 2018-10-05 RX ADMIN — ZINC SULFATE TAB 220 MG (50 MG ZINC EQUIVALENT) 220 MILLIGRAM(S): 220 (50 ZN) TAB at 12:24

## 2018-10-05 RX ADMIN — Medication 1 MILLIGRAM(S): at 12:24

## 2018-10-05 RX ADMIN — Medication 1 TABLET(S): at 12:24

## 2018-10-05 RX ADMIN — OXYCODONE HYDROCHLORIDE 15 MILLIGRAM(S): 5 TABLET ORAL at 02:23

## 2018-10-05 NOTE — PROGRESS NOTE ADULT - SUBJECTIVE AND OBJECTIVE BOX
Patient is a 28y old  Male who presents with a chief complaint of Pain of sacral and heel wounds (05 Oct 2018 09:09)    Patient was seen and examined, no complaints, awaiting placement and UCx results.    INTERVAL HPI/OVERNIGHT EVENTS:  T(C): 36.3 (10-05-18 @ 05:05), Max: 37.1 (10-04-18 @ 20:38)  HR: 63 (10-05-18 @ 05:05) (63 - 83)  BP: 110/48 (10-05-18 @ 05:05) (109/55 - 124/51)  RR: 16 (10-05-18 @ 05:05) (16 - 18)  SpO2: 99% (10-05-18 @ 05:05) (99% - 100%)  Wt(kg): --  I&O's Summary    04 Oct 2018 07:01  -  05 Oct 2018 07:00  --------------------------------------------------------  IN: 0 mL / OUT: 1550 mL / NET: -1550 mL    MEDICATIONS  (STANDING):  cefTRIAXone   IVPB      cefTRIAXone   IVPB 1 Gram(s) IV Intermittent every 24 hours  enoxaparin Injectable 40 milliGRAM(s) SubCutaneous every 24 hours  folic acid 1 milliGRAM(s) Oral daily  multivitamin 1 Tablet(s) Oral daily  zinc sulfate 220 milliGRAM(s) Oral daily    MEDICATIONS  (PRN):  baclofen 20 milliGRAM(s) Oral two times a day PRN Muscle Spasm  ondansetron Injectable 4 milliGRAM(s) IV Push every 6 hours PRN Nausea  oxyCODONE    IR 15 milliGRAM(s) Oral every 8 hours PRN Severe Pain (7 - 10)  senna 2 Tablet(s) Oral at bedtime PRN Constipation    PHYSICAL EXAM:  GENERAL: NAD, well-groomed, well-developed  HEAD:  Atraumatic, Normocephalic  EYES: EOMI, PERRLA, conjunctiva and sclera clear  ENMT: No tonsillar erythema, exudates, or enlargement; Moist mucous membranes, Good dentition, No lesions  NECK: Supple, No JVD, Normal thyroid  NERVOUS SYSTEM:  Alert & Oriented X3, Good concentration; b/l LE paraplegia  CHEST/LUNG: Clear to percussion bilaterally; No rales, rhonchi, wheezing, or rubs  HEART: Regular rate and rhythm; No murmurs, rubs, or gallops  ABDOMEN: Soft, Nontender, Nondistended; Bowel sounds present  EXTREMITIES:  2+ Peripheral Pulses, No clubbing, cyanosis, or edema  LYMPH: No lymphadenopathy noted  SKIN: multiple stage 4 ulcers over lower back and hip and b/l LE  Urinalysis Basic - ( 03 Oct 2018 21:17 )    Color: Yellow / Appearance: Slightly Turbid / S.010 / pH: x  Gluc: x / Ketone: Negative  / Bili: Negative / Urobili: Negative   Blood: x / Protein: 30 mg/dL / Nitrite: Positive   Leuk Esterase: Moderate / RBC: 5-10 /HPF / WBC 11-25 /HPF   Sq Epi: x / Non Sq Epi: Few /HPF / Bacteria: Many /HPF
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
## 2018-10-05 NOTE — DISCHARGE NOTE ADULT - PATIENT PORTAL LINK FT
You can access the [facility] Patient Portal, offered by [facility], by registering with the following website: [email or web address]

## 2018-10-05 NOTE — PROGRESS NOTE ADULT - PROBLEM SELECTOR PLAN 2
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 Ischium (1cm x 3cm x 0.4cm) with pink tissue, drainage, and white wound edges  Stage 4 Pressure Injury to the Sacrum (5cm x 7cm x 1cm) with pink tissue, bone, drainage, white wound edges, and undermining (up to 3cm at 1-8 o'clock)  Stage 4 Pressure Injury to the R. Ischium (3cm x 1cm x 1cm) with granulation tissue, bone, drainage, white wound edges, and undermining (up to 5cm at 360 degrees  Stage 3 Pressure Injury to the R. Hip (9cm x 6cm) with granulation tissue, pink tissue, drainage, and white wound edges	  Bilateral Lower Extremities Ulcerations, to which the patient is being followed by Podiatry with a treatment plan to be formulated to address these issues  podiatry consult- outpatient podiatry  surgery consult: signed off, no surgical intervention needed
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
## 2018-10-05 NOTE — DISCHARGE NOTE ADULT - CARE PLAN
Principal Discharge DX:	Chronic back pain  Goal:	to have management of back pain  Assessment and plan of treatment:	you presented with back pain, you had a CT lumbar spine showing Normal lumbar alignment without evidence of fracture. Thickened skin and infiltration of subcutaneous fat in the sacral region. You were treated for a urinary tract infection. You were seen by wound care and surgery for the multiple hip and sacral ulcers as well as lower extremity ulcers, recommending to clean all wounds with normal saline and apply skin prep to the surrounding skin, pack all wounds with Silver Calcium Alginate (Aquacel Ag) and cover with a Foam dressing Q 72hrs PRN; Encouraged to reposition Q 2hrs using wedges or pillows; follow up with PCP within 1 week for follow up, no surgical intervention needed at this time  Secondary Diagnosis:	Urinary tract infection  Assessment and plan of treatment:	you presented with burning on urination, were given IV antiobiotics and UCx was sent; advised to take medications as directed and follow up with PCP within 1 week  Secondary Diagnosis:	Decubitus ulcer  Assessment and plan of treatment:	you have a Stage 4 Pressure Injury to the L. Hip (9cm x 7cm x 1cm) with granulation tissue, bone, drainage, white wound edges, and undermining (up to 2cm at 6-12 o'clock); a Stage 4 Pressure Injury to the L. Ischium (1cm x 3cm x 0.4cm) with pink tissue, drainage, and white wound edges, a Stage 4 Pressure Injury to the Sacrum (5cm x 7cm x 1cm) with pink tissue, bone, drainage, white wound edges, and undermining (up to 3cm at 1-8 o'clock); a Stage 4 Pressure Injury to the R. Ischium (3cm x 1cm x 1cm) with granulation tissue, bone, drainage, white wound edges, and undermining (up to 5cm at 360 degree, a Stage 3 Pressure Injury to the R. Hip (9cm x 6cm) with granulation tissue, pink tissue, drainage, and white wound edges; Bilateral Lower Extremities Ulcerations, to which the patient is being followed by Podiatry with a treatment plan to be formulated to address these issues; do local wound care as described above  Secondary Diagnosis:	Colostomy in place  Assessment and plan of treatment:	local colostomy care recommended  Secondary Diagnosis:	Paraplegia  Assessment and plan of treatment:	you were seen by physical therapy and recommended to get physical therapy  Secondary Diagnosis:	Severe protein-calorie malnutrition  Assessment and plan of treatment:	albumin level 2.7, advised to take Ensure Enlive, 1 can three times a day, follow up with PCP within 1 week Principal Discharge DX:	Chronic back pain  Goal:	to have management of back pain  Assessment and plan of treatment:	you presented with back pain, you had a CT lumbar spine showing Normal lumbar alignment without evidence of fracture. Thickened skin and infiltration of subcutaneous fat in the sacral region. You were treated for a urinary tract infection. You were seen by wound care and surgery for the multiple hip and sacral ulcers as well as lower extremity ulcers, recommending to clean all wounds with normal saline and apply skin prep to the surrounding skin, pack all wounds with Silver Calcium Alginate (Aquacel Ag) and cover with a Foam dressing Q 72hrs PRN; Encouraged to reposition Q 2hrs using wedges or pillows; follow up with PCP within 1 week for follow up, no surgical intervention needed at this time; take pain medications as directed, as needed, for pain  Secondary Diagnosis:	Urinary tract infection  Assessment and plan of treatment:	you presented with burning on urination, were given IV antiobiotics and UCx was sent; advised to take medications as directed and follow up with PCP within 1 week  Secondary Diagnosis:	Decubitus ulcer  Assessment and plan of treatment:	you have a Stage 4 Pressure Injury to the L. Hip (9cm x 7cm x 1cm) with granulation tissue, bone, drainage, white wound edges, and undermining (up to 2cm at 6-12 o'clock); a Stage 4 Pressure Injury to the L. Ischium (1cm x 3cm x 0.4cm) with pink tissue, drainage, and white wound edges, a Stage 4 Pressure Injury to the Sacrum (5cm x 7cm x 1cm) with pink tissue, bone, drainage, white wound edges, and undermining (up to 3cm at 1-8 o'clock); a Stage 4 Pressure Injury to the R. Ischium (3cm x 1cm x 1cm) with granulation tissue, bone, drainage, white wound edges, and undermining (up to 5cm at 360 degree, a Stage 3 Pressure Injury to the R. Hip (9cm x 6cm) with granulation tissue, pink tissue, drainage, and white wound edges; Bilateral Lower Extremities Ulcerations, to which the patient is being followed by Podiatry with a treatment plan to be formulated to address these issues; do local wound care as described above  Secondary Diagnosis:	Colostomy in place  Assessment and plan of treatment:	local colostomy care recommended  Secondary Diagnosis:	Paraplegia  Assessment and plan of treatment:	you were seen by physical therapy and recommended to get physical therapy  Secondary Diagnosis:	Severe protein-calorie malnutrition  Assessment and plan of treatment:	albumin level 2.7, advised to take Ensure Enlive, 1 can three times a day, follow up with PCP within 1 week  Secondary Diagnosis:	Folliculitis  Assessment and plan of treatment:	take vibramycin as directed for folliculitis until Nov 6th, 2018 and topical lotrisone cream; follow up with PCP within 1 week Principal Discharge DX:	Chronic back pain  Goal:	to have management of back pain  Assessment and plan of treatment:	you presented with back pain, you had a CT lumbar spine showing Normal lumbar alignment without evidence of fracture. Thickened skin and infiltration of subcutaneous fat in the sacral region. You were treated for a urinary tract infection. You were seen by wound care and surgery for the multiple hip and sacral ulcers as well as lower extremity ulcers, recommending to clean all wounds with normal saline and apply skin prep to the surrounding skin, pack all wounds with Silver Calcium Alginate (Aquacel Ag) and cover with a Foam dressing Q 72hrs PRN; Encouraged to reposition Q 2hrs using wedges or pillows; follow up with PCP within 1 week for follow up, no surgical intervention needed at this time; take pain medications as directed, as needed, for pain  Secondary Diagnosis:	Urinary tract infection  Assessment and plan of treatment:	you presented with burning on urination, were given IV antiobiotics and UCx was sent; advised to take medications as directed and follow up with PCP within 1 week  Secondary Diagnosis:	Decubitus ulcer  Assessment and plan of treatment:	you have a Stage 4 Pressure Injury to the L. Hip (9cm x 7cm) with granulation tissue, bone, drainage, increased slough (30%), and undermining (up to 1.3cm at 6-12 o'clock); you have a Stage 4 Pressure Injury to the L. Ischium (1cm x 1cm x 0.5cm) with pink tissue, drainage, and white wound edges; you have a Stage 4 Pressure Injury to the Sacrum (9cm x 3.5cm x 1cm) with pink tissue, bone, drainage, white wound edges, and undermining (up to 3cm at 1-8 o'clock), you have a Stage 4 Pressure Injury to the R. Ischium (1cm x 1cm x 5cm) with granulation tissue, bone, drainage, white wound edges, and undermining (up to 5cm at 360 degrees); you have a Stage 3 Pressure Injury to the R. Hip (7cm x 4cm) with granulation tissue, slough (5%), drainage, and white wound edges; you have Bilateral Lower Extremities Ulcerations, to you are to follow up with podiatry; Recommendation: Clean all wounds with normal saline and apply skin prep to the surrounding skin; Apply Collagenase ointment to the slough areas of the L. Hip wound, apply saline moistened gauze to the wound bed, and cover with a Foam dressing Daily PRN; Pack the L. Ischial, Sacral, R. Hip, and R. Ischial wounds with Silver Calcium Alginate (Aquacel Ag) and cover with a Foam dressing Q 72hrs PRN; Encouraged to reposition Q 2hrs using wedges or pillows  Secondary Diagnosis:	Colostomy in place  Assessment and plan of treatment:	local colostomy care recommended  Secondary Diagnosis:	Paraplegia  Assessment and plan of treatment:	you were seen by physical therapy and recommended to get physical therapy  Secondary Diagnosis:	Severe protein-calorie malnutrition  Assessment and plan of treatment:	albumin level 2.7, advised to take Ensure Enlive, 1 can three times a day, follow up with PCP within 1 week  Secondary Diagnosis:	Folliculitis  Assessment and plan of treatment:	take vibramycin as directed for folliculitis until Nov 6th, 2018 and topical lotrisone cream; follow up with PCP within 1 week Principal Discharge DX:	Chronic back pain  Goal:	to have management of back pain  Assessment and plan of treatment:	you presented with back pain, you had a CT lumbar spine showing Normal lumbar alignment without evidence of fracture. Thickened skin and infiltration of subcutaneous fat in the sacral region. You were treated for a urinary tract infection. You were seen by wound care and surgery for the multiple hip and sacral ulcers as well as lower extremity ulcers, recommending to clean all wounds with normal saline and apply skin prep to the surrounding skin, pack all wounds with Silver Calcium Alginate (Aquacel Ag) and cover with a Foam dressing Q 72hrs PRN; Encouraged to reposition Q 2hrs using wedges or pillows; follow up with PCP within 1 week for follow up, no surgical intervention needed at this time; take pain medications as directed, as needed, for pain  Secondary Diagnosis:	Urinary tract infection  Assessment and plan of treatment:	you presented with burning on urination, were given IV antiobiotics and UCx was sent growing VRE; repeat UA was positive and repeat Urine culture was sent, you need to follow up the result of the repeat Urine culture; follow up with PCP within 1 week  Secondary Diagnosis:	Decubitus ulcer  Assessment and plan of treatment:	you have a Stage 4 Pressure Injury to the L. Hip (9cm x 7cm) with granulation tissue, bone, drainage, increased slough (30%), and undermining (up to 1.3cm at 6-12 o'clock); you have a Stage 4 Pressure Injury to the L. Ischium (1cm x 1cm x 0.5cm) with pink tissue, drainage, and white wound edges; you have a Stage 4 Pressure Injury to the Sacrum (9cm x 3.5cm x 1cm) with pink tissue, bone, drainage, white wound edges, and undermining (up to 3cm at 1-8 o'clock), you have a Stage 4 Pressure Injury to the R. Ischium (1cm x 1cm x 5cm) with granulation tissue, bone, drainage, white wound edges, and undermining (up to 5cm at 360 degrees); you have a Stage 3 Pressure Injury to the R. Hip (7cm x 4cm) with granulation tissue, slough (5%), drainage, and white wound edges; you have Bilateral Lower Extremities Ulcerations, to you are to follow up with podiatry; Recommendation: Clean all wounds with normal saline and apply skin prep to the surrounding skin; Apply Collagenase ointment to the slough areas of the L. Hip wound, apply saline moistened gauze to the wound bed, and cover with a Foam dressing Daily PRN; Pack the L. Ischial, Sacral, R. Hip, and R. Ischial wounds with Silver Calcium Alginate (Aquacel Ag) and cover with a Foam dressing Q 72hrs PRN; Encouraged to reposition Q 2hrs using wedges or pillows; use santyl ointment on left hip ulcer  Secondary Diagnosis:	Colostomy in place  Assessment and plan of treatment:	local colostomy care recommended  Secondary Diagnosis:	Paraplegia  Assessment and plan of treatment:	you were seen by physical therapy and recommended to get physical therapy  Secondary Diagnosis:	Severe protein-calorie malnutrition  Assessment and plan of treatment:	albumin level 2.7, advised to take Ensure Enlive, 1 can three times a day, follow up with PCP within 1 week  Secondary Diagnosis:	Folliculitis  Assessment and plan of treatment:	take vibramycin as directed for folliculitis until Nov 6th, 2018 and topical lotrisone cream; follow up with PCP within 1 week

## 2018-10-05 NOTE — DISCHARGE NOTE ADULT - FUNCTIONAL STATUS DATE
05-Oct-2018 09-Oct-2018 15-Oct-2018 16-Oct-2018 17-Oct-2018 22-Oct-2018 26-Oct-2018 31-Oct-2018 01-Nov-2018

## 2018-10-05 NOTE — DISCHARGE NOTE ADULT - SECONDARY DIAGNOSIS.
Urinary tract infection Decubitus ulcer Colostomy in place Paraplegia Severe protein-calorie malnutrition Folliculitis
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
## 2018-10-05 NOTE — DISCHARGE NOTE ADULT - PLAN OF CARE
to have management of back pain you presented with back pain, you had a CT lumbar spine showing Normal lumbar alignment without evidence of fracture. Thickened skin and infiltration of subcutaneous fat in the sacral region. You were treated for a urinary tract infection. You were seen by wound care and surgery for the multiple hip and sacral ulcers as well as lower extremity ulcers, recommending to clean all wounds with normal saline and apply skin prep to the surrounding skin, pack all wounds with Silver Calcium Alginate (Aquacel Ag) and cover with a Foam dressing Q 72hrs PRN; Encouraged to reposition Q 2hrs using wedges or pillows; follow up with PCP within 1 week for follow up, no surgical intervention needed at this time you presented with burning on urination, were given IV antiobiotics and UCx was sent; advised to take medications as directed and follow up with PCP within 1 week you have a Stage 4 Pressure Injury to the L. Hip (9cm x 7cm x 1cm) with granulation tissue, bone, drainage, white wound edges, and undermining (up to 2cm at 6-12 o'clock); a Stage 4 Pressure Injury to the L. Ischium (1cm x 3cm x 0.4cm) with pink tissue, drainage, and white wound edges, a Stage 4 Pressure Injury to the Sacrum (5cm x 7cm x 1cm) with pink tissue, bone, drainage, white wound edges, and undermining (up to 3cm at 1-8 o'clock); a Stage 4 Pressure Injury to the R. Ischium (3cm x 1cm x 1cm) with granulation tissue, bone, drainage, white wound edges, and undermining (up to 5cm at 360 degree, a Stage 3 Pressure Injury to the R. Hip (9cm x 6cm) with granulation tissue, pink tissue, drainage, and white wound edges; Bilateral Lower Extremities Ulcerations, to which the patient is being followed by Podiatry with a treatment plan to be formulated to address these issues; do local wound care as described above local colostomy care recommended you were seen by physical therapy and recommended to get physical therapy albumin level 2.7, advised to take Ensure Enlive, 1 can three times a day, follow up with PCP within 1 week you presented with back pain, you had a CT lumbar spine showing Normal lumbar alignment without evidence of fracture. Thickened skin and infiltration of subcutaneous fat in the sacral region. You were treated for a urinary tract infection. You were seen by wound care and surgery for the multiple hip and sacral ulcers as well as lower extremity ulcers, recommending to clean all wounds with normal saline and apply skin prep to the surrounding skin, pack all wounds with Silver Calcium Alginate (Aquacel Ag) and cover with a Foam dressing Q 72hrs PRN; Encouraged to reposition Q 2hrs using wedges or pillows; follow up with PCP within 1 week for follow up, no surgical intervention needed at this time; take pain medications as directed, as needed, for pain take vibramycin as directed for folliculitis until Nov 6th, 2018 and topical lotrisone cream; follow up with PCP within 1 week you have a Stage 4 Pressure Injury to the L. Hip (9cm x 7cm) with granulation tissue, bone, drainage, increased slough (30%), and undermining (up to 1.3cm at 6-12 o'clock); you have a Stage 4 Pressure Injury to the L. Ischium (1cm x 1cm x 0.5cm) with pink tissue, drainage, and white wound edges; you have a Stage 4 Pressure Injury to the Sacrum (9cm x 3.5cm x 1cm) with pink tissue, bone, drainage, white wound edges, and undermining (up to 3cm at 1-8 o'clock), you have a Stage 4 Pressure Injury to the R. Ischium (1cm x 1cm x 5cm) with granulation tissue, bone, drainage, white wound edges, and undermining (up to 5cm at 360 degrees); you have a Stage 3 Pressure Injury to the R. Hip (7cm x 4cm) with granulation tissue, slough (5%), drainage, and white wound edges; you have Bilateral Lower Extremities Ulcerations, to you are to follow up with podiatry; Recommendation: Clean all wounds with normal saline and apply skin prep to the surrounding skin; Apply Collagenase ointment to the slough areas of the L. Hip wound, apply saline moistened gauze to the wound bed, and cover with a Foam dressing Daily PRN; Pack the L. Ischial, Sacral, R. Hip, and R. Ischial wounds with Silver Calcium Alginate (Aquacel Ag) and cover with a Foam dressing Q 72hrs PRN; Encouraged to reposition Q 2hrs using wedges or pillows you presented with burning on urination, were given IV antiobiotics and UCx was sent growing VRE; repeat UA was positive and repeat Urine culture was sent, you need to follow up the result of the repeat Urine culture; follow up with PCP within 1 week you have a Stage 4 Pressure Injury to the L. Hip (9cm x 7cm) with granulation tissue, bone, drainage, increased slough (30%), and undermining (up to 1.3cm at 6-12 o'clock); you have a Stage 4 Pressure Injury to the L. Ischium (1cm x 1cm x 0.5cm) with pink tissue, drainage, and white wound edges; you have a Stage 4 Pressure Injury to the Sacrum (9cm x 3.5cm x 1cm) with pink tissue, bone, drainage, white wound edges, and undermining (up to 3cm at 1-8 o'clock), you have a Stage 4 Pressure Injury to the R. Ischium (1cm x 1cm x 5cm) with granulation tissue, bone, drainage, white wound edges, and undermining (up to 5cm at 360 degrees); you have a Stage 3 Pressure Injury to the R. Hip (7cm x 4cm) with granulation tissue, slough (5%), drainage, and white wound edges; you have Bilateral Lower Extremities Ulcerations, to you are to follow up with podiatry; Recommendation: Clean all wounds with normal saline and apply skin prep to the surrounding skin; Apply Collagenase ointment to the slough areas of the L. Hip wound, apply saline moistened gauze to the wound bed, and cover with a Foam dressing Daily PRN; Pack the L. Ischial, Sacral, R. Hip, and R. Ischial wounds with Silver Calcium Alginate (Aquacel Ag) and cover with a Foam dressing Q 72hrs PRN; Encouraged to reposition Q 2hrs using wedges or pillows; use santyl ointment on left hip ulcer

## 2018-10-05 NOTE — DISCHARGE NOTE ADULT - MEDICATION SUMMARY - MEDICATIONS TO TAKE
I will START or STAY ON the medications listed below when I get home from the hospital:    oxyCODONE 15 mg oral tablet  -- 1 tab(s) by mouth every 8 hours, As needed, Severe Pain (7 - 10)  -- Indication: For Pain control    gabapentin 100 mg oral capsule  -- 2 cap(s) by mouth every 12 hours  -- Indication: For Pain control    dronabinol 5 mg oral capsule  -- 1 cap(s) by mouth 2 times a day  -- Indication: For Pain control    lidocaine 5% topical film  -- Apply on skin to affected area once a day  -- Indication: For Pain control    senna oral tablet  -- 2 tab(s) by mouth once a day (at bedtime), As needed, Constipation  -- Indication: For Constipation    zinc sulfate 220 mg oral capsule  -- 1 cap(s) by mouth once a day  -- Indication: For Skin care    baclofen 20 mg oral tablet  -- 1 tab(s) by mouth 2 times a day, As needed, Muscle Spasm  -- Indication: For Pain control    buPROPion 150 mg/24 hours (XL) oral tablet, extended release  -- 1 tab(s) by mouth once a day  -- Indication: For Depression    Multiple Vitamins oral tablet  -- 1 tab(s) by mouth once a day  -- Indication: For Supplement    folic acid 1 mg oral tablet  -- 1 tab(s) by mouth once a day  -- Indication: For Supplement I will START or STAY ON the medications listed below when I get home from the hospital:    oxyCODONE 15 mg oral tablet  -- 1 tab(s) by mouth every 8 hours, As needed, Severe Pain (7 - 10)  -- Indication: For Pain control    acetaminophen 325 mg oral tablet  -- 2 tab(s) by mouth every 6 hours, As needed, Moderate Pain (4 - 6)  -- Indication: For Pain control    gabapentin 100 mg oral capsule  -- 2 cap(s) by mouth every 12 hours  -- Indication: For Pain control    doxycycline monohydrate 100 mg oral capsule  -- 1 cap(s) by mouth every 12 hours  -- Indication: For Folliculitis    lidocaine 5% topical film  -- Apply on skin to affected area once a day  -- Indication: For Chronic back pain    Lotrisone 1%-0.05% topical cream  -- Apply on skin to affected area once a day   -- For external use only.    -- Indication: For Folliculitis    senna oral tablet  -- 2 tab(s) by mouth once a day (at bedtime), As needed, Constipation  -- Indication: For Constipation    zinc sulfate 220 mg oral capsule  -- 1 cap(s) by mouth once a day  -- Indication: For Skin care    baclofen 20 mg oral tablet  -- 1 tab(s) by mouth 2 times a day, As needed, Muscle Spasm  -- Indication: For Pain control    buPROPion 150 mg/24 hours (XL) oral tablet, extended release  -- 1 tab(s) by mouth once a day  -- Indication: For Depression    Multiple Vitamins oral tablet  -- 1 tab(s) by mouth once a day  -- Indication: For Supplement    folic acid 1 mg oral tablet  -- 1 tab(s) by mouth once a day  -- Indication: For Supplement I will START or STAY ON the medications listed below when I get home from the hospital:    acetaminophen 325 mg oral tablet  -- 2 tab(s) by mouth every 6 hours, As needed, Moderate Pain (4 - 6)  -- Indication: For Pain control    celecoxib 200 mg oral capsule  -- 1 cap(s) by mouth once a day  -- Indication: For Pain control    oxyCODONE 15 mg oral tablet  -- 1 tab(s) by mouth every 6 hours, As needed, Moderate Pain (4 - 6)  -- Indication: For Pain control    gabapentin 100 mg oral capsule  -- 2 cap(s) by mouth every 12 hours  -- Indication: For Pain control    doxycycline monohydrate 100 mg oral capsule  -- 1 cap(s) by mouth every 12 hours  -- Indication: For Folliculitis    lidocaine 5% topical film  -- Apply on skin to affected area once a day  -- Indication: For Chronic back pain    collagenase 250 units/g topical ointment  -- 1 application on skin once a day  -- Indication: For Left hip ulceration    capsaicin 0.025% topical cream  -- 1 application on skin 3 times a day  -- Indication: For Pain control    senna oral tablet  -- 2 tab(s) by mouth once a day (at bedtime), As needed, Constipation  -- Indication: For Constipation    zinc sulfate 220 mg oral capsule  -- 1 cap(s) by mouth once a day  -- Indication: For Skin care    baclofen 20 mg oral tablet  -- 1 tab(s) by mouth 2 times a day, As needed, Muscle Spasm  -- Indication: For Pain control    buPROPion 150 mg/24 hours (XL) oral tablet, extended release  -- 1 tab(s) by mouth once a day  -- Indication: For Depression    Multiple Vitamins oral tablet  -- 1 tab(s) by mouth once a day  -- Indication: For Supplement    folic acid 1 mg oral tablet  -- 1 tab(s) by mouth once a day  -- Indication: For Supplement I will START or STAY ON the medications listed below when I get home from the hospital:    acetaminophen 325 mg oral tablet  -- 2 tab(s) by mouth every 6 hours, As needed, Moderate Pain (4 - 6)  -- Indication: For Pain control    celecoxib 200 mg oral capsule  -- 1 cap(s) by mouth once a day  -- Indication: For Pain control    oxyCODONE 15 mg oral tablet  -- 1 tab(s) by mouth every 6 hours, As needed, Moderate Pain (4 - 6) MDD:4 tabs/day  -- Indication: For Pain control    gabapentin 100 mg oral capsule  -- 2 cap(s) by mouth every 12 hours  -- Indication: For Pain control    doxycycline monohydrate 100 mg oral capsule  -- 1 cap(s) by mouth every 12 hours  -- Indication: For Folliculitis    capsaicin 0.025% topical cream  -- 1 application on skin 3 times a day  -- Indication: For Pain control    collagenase 250 units/g topical ointment  -- 1 application on skin once a day  -- Indication: For left hip ulcer    lidocaine 5% topical film  -- Apply on skin to affected area once a day   -- Indication: For Pain control    zinc sulfate 220 mg oral capsule  -- 1 cap(s) by mouth once a day  -- Indication: For wound healing    baclofen 20 mg oral tablet  -- 1 tab(s) by mouth 2 times a day, As needed, Muscle Spasm  -- Indication: For Pain control    buPROPion 150 mg/24 hours (XL) oral tablet, extended release  -- 1 tab(s) by mouth once a day  -- Indication: For Depression    Multiple Vitamins oral tablet  -- 1 tab(s) by mouth once a day  -- Indication: For Supplement    folic acid 1 mg oral tablet  -- 1 tab(s) by mouth once a day  -- Indication: For Supplement

## 2018-10-06 PROCEDURE — 99232 SBSQ HOSP IP/OBS MODERATE 35: CPT | Mod: GC

## 2018-10-06 RX ORDER — OXYCODONE AND ACETAMINOPHEN 5; 325 MG/1; MG/1
1 TABLET ORAL ONCE
Qty: 0 | Refills: 0 | Status: DISCONTINUED | OUTPATIENT
Start: 2018-10-06 | End: 2018-10-06

## 2018-10-06 RX ADMIN — Medication 1 TABLET(S): at 11:43

## 2018-10-06 RX ADMIN — OXYCODONE AND ACETAMINOPHEN 1 TABLET(S): 5; 325 TABLET ORAL at 18:00

## 2018-10-06 RX ADMIN — OXYCODONE HYDROCHLORIDE 15 MILLIGRAM(S): 5 TABLET ORAL at 22:00

## 2018-10-06 RX ADMIN — Medication 20 MILLIGRAM(S): at 20:47

## 2018-10-06 RX ADMIN — OXYCODONE HYDROCHLORIDE 15 MILLIGRAM(S): 5 TABLET ORAL at 21:47

## 2018-10-06 RX ADMIN — OXYCODONE HYDROCHLORIDE 15 MILLIGRAM(S): 5 TABLET ORAL at 14:30

## 2018-10-06 RX ADMIN — ENOXAPARIN SODIUM 40 MILLIGRAM(S): 100 INJECTION SUBCUTANEOUS at 06:07

## 2018-10-06 RX ADMIN — OXYCODONE AND ACETAMINOPHEN 1 TABLET(S): 5; 325 TABLET ORAL at 17:05

## 2018-10-06 RX ADMIN — OXYCODONE HYDROCHLORIDE 15 MILLIGRAM(S): 5 TABLET ORAL at 05:11

## 2018-10-06 RX ADMIN — Medication 20 MILLIGRAM(S): at 09:53

## 2018-10-06 RX ADMIN — Medication 1 MILLIGRAM(S): at 11:43

## 2018-10-06 RX ADMIN — OXYCODONE HYDROCHLORIDE 15 MILLIGRAM(S): 5 TABLET ORAL at 13:38

## 2018-10-06 RX ADMIN — ZINC SULFATE TAB 220 MG (50 MG ZINC EQUIVALENT) 220 MILLIGRAM(S): 220 (50 ZN) TAB at 11:43

## 2018-10-06 RX ADMIN — OXYCODONE HYDROCHLORIDE 15 MILLIGRAM(S): 5 TABLET ORAL at 05:51

## 2018-10-06 RX ADMIN — CEFTRIAXONE 100 GRAM(S): 500 INJECTION, POWDER, FOR SOLUTION INTRAMUSCULAR; INTRAVENOUS at 09:53
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
## 2018-10-06 NOTE — PROGRESS NOTE ADULT - PROBLEM SELECTOR PLAN 2
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 Ischium (1cm x 3cm x 0.4cm) with pink tissue, drainage, and white wound edges  Stage 4 Pressure Injury to the Sacrum (5cm x 7cm x 1cm) with pink tissue, bone, drainage, white wound edges, and undermining (up to 3cm at 1-8 o'clock)  Stage 4 Pressure Injury to the R. Ischium (3cm x 1cm x 1cm) with granulation tissue, bone, drainage, white wound edges, and undermining (up to 5cm at 360 degrees  Stage 3 Pressure Injury to the R. Hip (9cm x 6cm) with granulation tissue, pink tissue, drainage, and white wound edges	  Bilateral Lower Extremities Ulcerations, to which the patient is being followed by Podiatry with a treatment plan to be formulated to address these issues  podiatry consult- outpatient podiatry  surgery consult: signed off, no surgical intervention needed 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 Ischium (1cm x 3cm x 0.4cm) with pink tissue, drainage, and white wound edges  Stage 4 Pressure Injury to the Sacrum (5cm x 7cm x 1cm) with pink tissue, bone, drainage, white wound edges, and undermining (up to 3cm at 1-8 o'clock)  Stage 4 Pressure Injury to the R. Ischium (3cm x 1cm x 1cm) with granulation tissue, bone, drainage, white wound edges, and undermining (up to 5cm at 360 degrees  Stage 3 Pressure Injury to the R. Hip (9cm x 6cm) with granulation tissue, pink tissue, drainage, and white wound edges	  Bilateral Lower Extremities Ulcerations, to which the patient is being followed by Podiatry with a treatment plan to be formulated to address these issues  podiatry consult - outpatient podiatry  surgery consult - signed off, no surgical intervention needed

## 2018-10-06 NOTE — PROGRESS NOTE ADULT - SUBJECTIVE AND OBJECTIVE BOX
PGY1 Note discussed with Supervising Resident and Primary Attending.    Patient is a 28y old  Male who presents with a chief complaint of Pain of sacral and heel wounds (05 Oct 2018 12:17)      INTERVAL HPI/OVERNIGHT EVENTS :  No acute overnight events. Patient seen and examined at bedside. Patient has no current complaints. Patient denies nausea, vomiting, diarrhea, chest pain, SOB, headache.  MEDICATIONS  (STANDING):  cefTRIAXone   IVPB      cefTRIAXone   IVPB 1 Gram(s) IV Intermittent every 24 hours  enoxaparin Injectable 40 milliGRAM(s) SubCutaneous every 24 hours  folic acid 1 milliGRAM(s) Oral daily  multivitamin 1 Tablet(s) Oral daily  zinc sulfate 220 milliGRAM(s) Oral daily    MEDICATIONS  (PRN):  baclofen 20 milliGRAM(s) Oral two times a day PRN Muscle Spasm  ondansetron Injectable 4 milliGRAM(s) IV Push every 6 hours PRN Nausea  oxyCODONE    IR 15 milliGRAM(s) Oral every 8 hours PRN Severe Pain (7 - 10)  senna 2 Tablet(s) Oral at bedtime PRN Constipation      Allergies    No Known Allergies    Intolerances        REVIEW OF SYSTEMS :  * General: denies chills, fatigue  * Eyes: denies vision changes  * Respiratory: denies cough, SOB, wheezing  * Cardio: denies chest pain, palpitations  * GI: denies abd pain, nausea, vomiting, diarrhea  * : denies dysuria  * Neuro: denies headaches, numbness, weakness  * MSK: denies joint pain  * Skin: denies itching, rashes    Vital Signs Last 24 Hrs  T(C): 36.8 (06 Oct 2018 05:08), Max: 38.3 (05 Oct 2018 20:33)  T(F): 98.2 (06 Oct 2018 05:08), Max: 100.9 (05 Oct 2018 20:33)  HR: 62 (06 Oct 2018 05:08) (62 - 72)  BP: 150/79 (06 Oct 2018 05:08) (96/53 - 150/79)  BP(mean): --  RR: 16 (06 Oct 2018 05:08) (16 - 17)  SpO2: 100% (06 Oct 2018 05:08) (100% - 100%)    PHYSICAL EXAM :  * General: no acute distress, well-nourished, well-developed  * HEENT: atraumatic, normocephalic; moist mucus membranes; supple neck; no JVD  * CN: EOMI, PERRL  * Respiratory: cta b/l; no wheezes, rales, rhonchi  * Cardio: RRR; no appreciable murmurs, rubs, gallops  * Abd: soft, nontender, nondistended, +BS  * Neuro: AAOx3; strength 5/5; sensation intact throughout  * Extremities: no clubbing, cyanosis, edema  * Skin: no rashes    LABS:                CAPILLARY BLOOD GLUCOSE          RADIOLOGY & ADDITIONAL TESTS:   no new imaging    Imaging Personally Reviewed:    Consultant(s) Notes Reviewed: PGY1 Note discussed with Supervising Resident and Primary Attending.    Patient is a 28y old Male who presents with a chief complaint of Pain of sacral and heel wounds (05 Oct 2018 12:17)      INTERVAL HPI/OVERNIGHT EVENTS :  No acute overnight events. Patient seen and examined at bedside. Patient reports some back pain particularly around the shoulders, and is using icy/hot packs that he says are only helping a little. Patient denies nausea, vomiting, diarrhea, chest pain, SOB, headache. Waiting for [facility] placement, been denied from multiple facilities due to history of leaving [facility] etc.    MEDICATIONS  (STANDING):  cefTRIAXone   IVPB      cefTRIAXone   IVPB 1 Gram(s) IV Intermittent every 24 hours  enoxaparin Injectable 40 milliGRAM(s) SubCutaneous every 24 hours  folic acid 1 milliGRAM(s) Oral daily  multivitamin 1 Tablet(s) Oral daily  zinc sulfate 220 milliGRAM(s) Oral daily    MEDICATIONS  (PRN):  baclofen 20 milliGRAM(s) Oral two times a day PRN Muscle Spasm  ondansetron Injectable 4 milliGRAM(s) IV Push every 6 hours PRN Nausea  oxyCODONE    IR 15 milliGRAM(s) Oral every 8 hours PRN Severe Pain (7 - 10)  senna 2 Tablet(s) Oral at bedtime PRN Constipation      Allergies    No Known Allergies    Intolerances        REVIEW OF SYSTEMS :  * General: denies chills, fatigue  * Eyes: denies vision changes  * Respiratory: denies cough, SOB, wheezing  * Cardio: denies chest pain, palpitations  * GI: denies abd pain, nausea, vomiting, diarrhea  * : denies dysuria  * Neuro: denies headaches, numbness, weakness  * MSK: reports mild back pain between the shoulders; denies joint pain  * Skin: denies itching, rashes    Vital Signs Last 24 Hrs  T(C): 36.8 (06 Oct 2018 05:08), Max: 38.3 (05 Oct 2018 20:33)  T(F): 98.2 (06 Oct 2018 05:08), Max: 100.9 (05 Oct 2018 20:33)  HR: 62 (06 Oct 2018 05:08) (62 - 72)  BP: 150/79 (06 Oct 2018 05:08) (96/53 - 150/79)  BP(mean): --  RR: 16 (06 Oct 2018 05:08) (16 - 17)  SpO2: 100% (06 Oct 2018 05:08) (100% - 100%)    PHYSICAL EXAM :  * General: no acute distress, well-nourished, well-developed  * HEENT: atraumatic, normocephalic; moist mucus membranes; supple neck; no JVD  * CN: EOMI, PERRL  * Respiratory: cta b/l; no wheezes, rales, rhonchi  * Cardio: RRR; no appreciable murmurs, rubs, gallops  * Abd: soft, nontender, nondistended, +BS  * Neuro: AAOx3; strength 5/5 in b/l UE; paraplegic  * Extremities: no clubbing, cyanosis, edema  * Skin: multiple decubitus ulcers and ulcers of the b/l LE    LABS:                CAPILLARY BLOOD GLUCOSE          RADIOLOGY & ADDITIONAL TESTS:   no new imaging    Consultant(s) Notes Reviewed: no new consults
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
## 2018-10-07 LAB

## 2018-10-07 PROCEDURE — 99232 SBSQ HOSP IP/OBS MODERATE 35: CPT | Mod: GC

## 2018-10-07 RX ORDER — OXYCODONE AND ACETAMINOPHEN 5; 325 MG/1; MG/1
1 TABLET ORAL ONCE
Qty: 0 | Refills: 0 | Status: DISCONTINUED | OUTPATIENT
Start: 2018-10-07 | End: 2018-10-07

## 2018-10-07 RX ADMIN — CEFTRIAXONE 100 GRAM(S): 500 INJECTION, POWDER, FOR SOLUTION INTRAMUSCULAR; INTRAVENOUS at 11:34

## 2018-10-07 RX ADMIN — OXYCODONE HYDROCHLORIDE 15 MILLIGRAM(S): 5 TABLET ORAL at 09:00

## 2018-10-07 RX ADMIN — Medication 1 TABLET(S): at 11:34

## 2018-10-07 RX ADMIN — OXYCODONE HYDROCHLORIDE 15 MILLIGRAM(S): 5 TABLET ORAL at 16:59

## 2018-10-07 RX ADMIN — OXYCODONE HYDROCHLORIDE 15 MILLIGRAM(S): 5 TABLET ORAL at 07:58

## 2018-10-07 RX ADMIN — ONDANSETRON 4 MILLIGRAM(S): 8 TABLET, FILM COATED ORAL at 12:45

## 2018-10-07 RX ADMIN — OXYCODONE AND ACETAMINOPHEN 1 TABLET(S): 5; 325 TABLET ORAL at 12:34

## 2018-10-07 RX ADMIN — OXYCODONE HYDROCHLORIDE 15 MILLIGRAM(S): 5 TABLET ORAL at 18:18

## 2018-10-07 RX ADMIN — ZINC SULFATE TAB 220 MG (50 MG ZINC EQUIVALENT) 220 MILLIGRAM(S): 220 (50 ZN) TAB at 11:34

## 2018-10-07 RX ADMIN — Medication 1 MILLIGRAM(S): at 11:34

## 2018-10-07 RX ADMIN — ENOXAPARIN SODIUM 40 MILLIGRAM(S): 100 INJECTION SUBCUTANEOUS at 06:08

## 2018-10-07 RX ADMIN — Medication 20 MILLIGRAM(S): at 07:58

## 2018-10-07 RX ADMIN — OXYCODONE AND ACETAMINOPHEN 1 TABLET(S): 5; 325 TABLET ORAL at 13:30

## 2018-10-07 NOTE — PROGRESS NOTE ADULT - SUBJECTIVE AND OBJECTIVE BOX
Patient is a 28y old  Male who presents with a chief complaint of Pain of sacral and heel wounds (06 Oct 2018 09:05)    INTERVAL HPI/OVERNIGHT EVENTS:  Patient was seen and examined at bedside, feeling ok  Says has pain though he is on his home regimen of oxycodone, but he says he usually uses Marijuana in addition  which he not using here.     Allergies  No Known Allergies  Intolerances    REVIEW OF SYSTEMS:  CONSTITUTIONAL: No fever, weight loss, or fatigue  EYES: No eye pain, visual disturbances, or discharge  RESPIRATORY: No cough, wheezing or shortness of breath  CARDIOVASCULAR: No chest pain, feeling of heart beats  GASTROINTESTINAL: No abdominal or epigastric pain. No nausea, vomiting; No diarrhea or constipation.  GENITOURINARY: No dysuria, frequency, hematuria  NEUROLOGICAL: No headaches  MUSCULOSKELETAL: low back pain  PSYCHIATRIC: No depression or anxiety  HEME/LYMPH: No easy bruising, or bleeding gums  ALLERGY AND IMMUNOLOGIC: No hives or eczema    Medications:  baclofen 20 milliGRAM(s) Oral two times a day PRN Muscle Spasm  cefTRIAXone   IVPB      cefTRIAXone   IVPB 1 Gram(s) IV Intermittent every 24 hours  enoxaparin Injectable 40 milliGRAM(s) SubCutaneous every 24 hours  folic acid 1 milliGRAM(s) Oral daily  multivitamin 1 Tablet(s) Oral daily  ondansetron Injectable 4 milliGRAM(s) IV Push every 6 hours PRN Nausea  oxyCODONE    IR 15 milliGRAM(s) Oral every 8 hours PRN Severe Pain (7 - 10)  senna 2 Tablet(s) Oral at bedtime PRN Constipation  zinc sulfate 220 milliGRAM(s) Oral daily      PHYSICAL EXAM:  Vital Signs Last 24 Hrs  T(C): 36.8 (07 Oct 2018 14:38), Max: 36.9 (06 Oct 2018 21:08)  T(F): 98.2 (07 Oct 2018 14:38), Max: 98.5 (06 Oct 2018 21:08)  HR: 78 (07 Oct 2018 14:38) (64 - 78)  BP: 111/67 (07 Oct 2018 14:38) (111/67 - 129/56)  BP(mean): --  RR: 18 (07 Oct 2018 14:38) (16 - 18)  SpO2: 100% (07 Oct 2018 14:38) (98% - 100%)    GENERAL: NAD  HEAD:  Atraumatic, Normocephalic  EYES: EOMI, PERRLA, conjunctiva and sclera clear  ENT: moist mucous membranes  NECK: Supple, No JVD, Normal thyroid  NERVOUS SYSTEM:  Alert & Oriented X3, Good concentration;   CHEST/LUNG: No rales, rhonchi, wheezing, or rubs  HEART: Regular rate and rhythm; No murmurs, rubs, or gallops  ABDOMEN: Soft, Nontender, Nondistended; Bowel sounds present  EXTREMITIES: multiple deep decubiti stage 4.   SKIN: multiple decubiti stage 4.     LABS:    Culture & Sensitivity:   CAPILLARY BLOOD GLUCOSE          10-06 @ 07:01  -  10-07 @ 07:00  --------------------------------------------------------  IN: 0 mL / OUT: 500 mL / NET: -500 mL        RADIOLOGY & ADDITIONAL TESTS:  Radiology testing independently reviewed:     Consultant(s) Notes Reviewed:  [ x] YES  [ ] NO    Care Discussed with Consultants/Other Providers [ x] YES  [ ] NO

## 2018-10-07 NOTE — CHART NOTE - NSCHARTNOTEFT_GEN_A_CORE
I evaluated the patient at bedside. He is complaining of vomiting x 2 episodes and dizziness. He is paraplegic. so orthostatics done in 2 positions Negative  SBP/DBP laying down 115/68, HR 71  SBP/DBP sitting position 111/67, HR 80  Denied chest pain, SOB, palpitations and headache. Laying comfortably on bed watching TV.

## 2018-10-07 NOTE — PROGRESS NOTE ADULT - PROBLEM SELECTOR PLAN 2
multiple decubiti stage 4 to the sacrum and gluteal regions, bilateral heels.   podiatry input appreciated  Wound care input appreciated  surgery input appreciated

## 2018-10-08 DIAGNOSIS — F32.9 MAJOR DEPRESSIVE DISORDER, SINGLE EPISODE, UNSPECIFIED: ICD-10-CM

## 2018-10-08 DIAGNOSIS — N39.0 URINARY TRACT INFECTION, SITE NOT SPECIFIED: ICD-10-CM

## 2018-10-08 PROCEDURE — 99233 SBSQ HOSP IP/OBS HIGH 50: CPT | Mod: GC

## 2018-10-08 PROCEDURE — 90792 PSYCH DIAG EVAL W/MED SRVCS: CPT

## 2018-10-08 RX ORDER — LINEZOLID 600 MG/300ML
600 INJECTION, SOLUTION INTRAVENOUS ONCE
Qty: 0 | Refills: 0 | Status: COMPLETED | OUTPATIENT
Start: 2018-10-08 | End: 2018-10-08

## 2018-10-08 RX ORDER — MORPHINE SULFATE 50 MG/1
2 CAPSULE, EXTENDED RELEASE ORAL ONCE
Qty: 0 | Refills: 0 | Status: DISCONTINUED | OUTPATIENT
Start: 2018-10-08 | End: 2018-10-08

## 2018-10-08 RX ORDER — DRONABINOL 2.5 MG
5 CAPSULE ORAL
Qty: 0 | Refills: 0 | Status: DISCONTINUED | OUTPATIENT
Start: 2018-10-08 | End: 2018-10-15

## 2018-10-08 RX ORDER — LINEZOLID 600 MG/300ML
600 INJECTION, SOLUTION INTRAVENOUS EVERY 12 HOURS
Qty: 0 | Refills: 0 | Status: DISCONTINUED | OUTPATIENT
Start: 2018-10-08 | End: 2018-10-09

## 2018-10-08 RX ORDER — LINEZOLID 600 MG/300ML
INJECTION, SOLUTION INTRAVENOUS
Qty: 0 | Refills: 0 | Status: DISCONTINUED | OUTPATIENT
Start: 2018-10-08 | End: 2018-10-09

## 2018-10-08 RX ADMIN — Medication 1 MILLIGRAM(S): at 13:09

## 2018-10-08 RX ADMIN — MORPHINE SULFATE 2 MILLIGRAM(S): 50 CAPSULE, EXTENDED RELEASE ORAL at 15:44

## 2018-10-08 RX ADMIN — OXYCODONE HYDROCHLORIDE 15 MILLIGRAM(S): 5 TABLET ORAL at 05:11

## 2018-10-08 RX ADMIN — ZINC SULFATE TAB 220 MG (50 MG ZINC EQUIVALENT) 220 MILLIGRAM(S): 220 (50 ZN) TAB at 13:09

## 2018-10-08 RX ADMIN — Medication 1 TABLET(S): at 13:09

## 2018-10-08 RX ADMIN — ENOXAPARIN SODIUM 40 MILLIGRAM(S): 100 INJECTION SUBCUTANEOUS at 06:18

## 2018-10-08 RX ADMIN — OXYCODONE HYDROCHLORIDE 15 MILLIGRAM(S): 5 TABLET ORAL at 22:07

## 2018-10-08 RX ADMIN — MORPHINE SULFATE 2 MILLIGRAM(S): 50 CAPSULE, EXTENDED RELEASE ORAL at 15:59

## 2018-10-08 RX ADMIN — LINEZOLID 300 MILLIGRAM(S): 600 INJECTION, SOLUTION INTRAVENOUS at 18:38

## 2018-10-08 RX ADMIN — Medication 20 MILLIGRAM(S): at 22:27

## 2018-10-08 RX ADMIN — OXYCODONE HYDROCHLORIDE 15 MILLIGRAM(S): 5 TABLET ORAL at 14:19

## 2018-10-08 RX ADMIN — Medication 5 MILLIGRAM(S): at 18:50

## 2018-10-08 RX ADMIN — OXYCODONE HYDROCHLORIDE 15 MILLIGRAM(S): 5 TABLET ORAL at 05:41

## 2018-10-08 RX ADMIN — LINEZOLID 300 MILLIGRAM(S): 600 INJECTION, SOLUTION INTRAVENOUS at 05:12

## 2018-10-08 RX ADMIN — OXYCODONE HYDROCHLORIDE 15 MILLIGRAM(S): 5 TABLET ORAL at 22:30

## 2018-10-08 RX ADMIN — OXYCODONE HYDROCHLORIDE 15 MILLIGRAM(S): 5 TABLET ORAL at 13:19

## 2018-10-08 NOTE — PROGRESS NOTE ADULT - PROBLEM SELECTOR PLAN 2
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 Ischium (1cm x 3cm x 0.4cm) with pink tissue, drainage, and white wound edges  Stage 4 Pressure Injury to the Sacrum (5cm x 7cm x 1cm) with pink tissue, bone, drainage, white wound edges, and undermining (up to 3cm at 1-8 o'clock)  Stage 4 Pressure Injury to the R. Ischium (3cm x 1cm x 1cm) with granulation tissue, bone, drainage, white wound edges, and undermining (up to 5cm at 360 degrees  Stage 3 Pressure Injury to the R. Hip (9cm x 6cm) with granulation tissue, pink tissue, drainage, and white wound edges	  Bilateral Lower Extremities Ulcerations, to which the patient is being followed by Podiatry with a treatment plan to be formulated to address these issues  podiatry consult - outpatient podiatry  surgery consult - signed off, no surgical intervention needed  Currently on ABx for UTI only, does not require ABx for chronic wounds  Wound care daily

## 2018-10-08 NOTE — CHART NOTE - NSCHARTNOTEFT_GEN_A_CORE
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 Entered order for contact precautions. Patient was also on ceftriaxone. I changed ceftriaxone to zyvox based on sensitivities. Primary team to address and make further adjustments if necessary. Thank you.
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
## 2018-10-08 NOTE — BEHAVIORAL HEALTH ASSESSMENT NOTE - NSBHCHARTREVIEWVS_PSY_A_CORE FT
Vital Signs Last 24 Hrs  T(C): 36.9 (08 Oct 2018 14:31), Max: 36.9 (07 Oct 2018 20:12)  T(F): 98.4 (08 Oct 2018 14:31), Max: 98.5 (07 Oct 2018 20:12)  HR: 69 (08 Oct 2018 15:42) (59 - 80)  BP: 118/61 (08 Oct 2018 15:42) (95/51 - 150/74)  BP(mean): --  RR: 18 (08 Oct 2018 14:31) (16 - 18)  SpO2: 100% (08 Oct 2018 14:31) (100% - 100%)

## 2018-10-08 NOTE — PROGRESS NOTE ADULT - SUBJECTIVE AND OBJECTIVE BOX
Patient is a 28y old  Male who presents with a chief complaint of Pain of sacral and heel wounds (07 Oct 2018 16:44)      INTERVAL HPI/OVERNIGHT EVENTS:  T(C): 36.6 (10-08-18 @ 05:16), Max: 36.9 (10-07-18 @ 20:12)  HR: 72 (10-08-18 @ 05:16) (59 - 78)  BP: 126/64 (10-08-18 @ 05:16) (111/67 - 150/74)  RR: 16 (10-08-18 @ 05:16) (16 - 18)  SpO2: 100% (10-08-18 @ 05:16) (100% - 100%)  Wt(kg): --  I&O's Summary    07 Oct 2018 07:01  -  08 Oct 2018 07:00  --------------------------------------------------------  IN: 0 mL / OUT: 2400 mL / NET: -2400 mL        LABS:              CAPILLARY BLOOD GLUCOSE                  MEDICATIONS  (STANDING):  enoxaparin Injectable 40 milliGRAM(s) SubCutaneous every 24 hours  folic acid 1 milliGRAM(s) Oral daily  linezolid  IVPB 600 milliGRAM(s) IV Intermittent every 12 hours  linezolid  IVPB      multivitamin 1 Tablet(s) Oral daily  zinc sulfate 220 milliGRAM(s) Oral daily    MEDICATIONS  (PRN):  baclofen 20 milliGRAM(s) Oral two times a day PRN Muscle Spasm  ondansetron Injectable 4 milliGRAM(s) IV Push every 6 hours PRN Nausea  oxyCODONE    IR 15 milliGRAM(s) Oral every 8 hours PRN Severe Pain (7 - 10)  senna 2 Tablet(s) Oral at bedtime PRN Constipation      REVIEW OF SYSTEMS:  CONSTITUTIONAL: No fever, weight loss, or fatigue  EYES: No eye pain, visual disturbances, or discharge  ENMT:  No difficulty hearing, tinnitus, vertigo; No sinus or throat pain  NECK: No pain or stiffness  BREASTS: No pain, masses, or nipple discharge  RESPIRATORY: No cough, wheezing, chills or hemoptysis; No shortness of breath  CARDIOVASCULAR: No chest pain, palpitations, dizziness, or leg swelling  GASTROINTESTINAL: No abdominal or epigastric pain. No nausea, vomiting, or hematemesis; No diarrhea or constipation. No melena or hematochezia.  GENITOURINARY: No dysuria, frequency, hematuria, or incontinence  NEUROLOGICAL: No headaches, memory loss, loss of strength, numbness, or tremors  SKIN: No itching, burning, rashes, or lesions   LYMPH NODES: No enlarged glands  ENDOCRINE: No heat or cold intolerance; No hair loss  MUSCULOSKELETAL: No joint pain or swelling; No muscle, back, or extremity pain  PSYCHIATRIC: No depression, anxiety, mood swings, or difficulty sleeping  HEME/LYMPH: No easy bruising, or bleeding gums  ALLERY AND IMMUNOLOGIC: No hives or eczema    GENERAL: NAD  HEAD:  Atraumatic, Normocephalic  EYES: EOMI, PERRLA, conjunctiva and sclera clear  ENT: moist mucous membranes  NECK: Supple, No JVD, Normal thyroid  NERVOUS SYSTEM:  Alert & Oriented X3, Good concentration;   CHEST/LUNG: No rales, rhonchi, wheezing, or rubs  HEART: Regular rate and rhythm; No murmurs, rubs, or gallops  ABDOMEN: Soft, Nontender, Nondistended; Bowel sounds present  EXTREMITIES: multiple deep decubiti stage 4.   SKIN: multiple decubiti stage 4.    Care Discussed with Consultants/Other Providers [x ] YES  [ ] NO Patient is a 28y old  Male who presents with a chief complaint of Pain of sacral and heel wounds (07 Oct 2018 16:44)      INTERVAL HPI/OVERNIGHT EVENTS:  T(C): 36.6 (10-08-18 @ 05:16), Max: 36.9 (10-07-18 @ 20:12)  HR: 72 (10-08-18 @ 05:16) (59 - 78)  BP: 126/64 (10-08-18 @ 05:16) (111/67 - 150/74)  RR: 16 (10-08-18 @ 05:16) (16 - 18)  SpO2: 100% (10-08-18 @ 05:16) (100% - 100%)  Wt(kg): --  I&O's Summary    07 Oct 2018 07:01  -  08 Oct 2018 07:00  --------------------------------------------------------  IN: 0 mL / OUT: 2400 mL / NET: -2400 mL        LABS: no labs for today              MEDICATIONS  (STANDING):  enoxaparin Injectable 40 milliGRAM(s) SubCutaneous every 24 hours  folic acid 1 milliGRAM(s) Oral daily  linezolid  IVPB 600 milliGRAM(s) IV Intermittent every 12 hours  linezolid  IVPB      multivitamin 1 Tablet(s) Oral daily  zinc sulfate 220 milliGRAM(s) Oral daily    MEDICATIONS  (PRN):  baclofen 20 milliGRAM(s) Oral two times a day PRN Muscle Spasm  ondansetron Injectable 4 milliGRAM(s) IV Push every 6 hours PRN Nausea  oxyCODONE    IR 15 milliGRAM(s) Oral every 8 hours PRN Severe Pain (7 - 10)  senna 2 Tablet(s) Oral at bedtime PRN Constipation      REVIEW OF SYSTEMS:  CONSTITUTIONAL: No fever, weight loss, + fatigue, generalized pain  EYES: No eye pain, visual disturbances, or discharge  ENMT:  No difficulty hearing, tinnitus, vertigo; No sinus or throat pain  NECK: No pain or stiffness  BREASTS: No pain, masses, or nipple discharge  RESPIRATORY: No cough, wheezing, chills or hemoptysis; No shortness of breath  CARDIOVASCULAR: No chest pain, palpitations, dizziness, or leg swelling  GASTROINTESTINAL: No abdominal or epigastric pain. No nausea, vomiting, or hematemesis; No diarrhea or constipation. No melena or hematochezia.  GENITOURINARY: No dysuria, frequency, hematuria, or incontinence  NEUROLOGICAL: No headaches, memory loss, loss of strength, numbness, or tremors  SKIN: No itching, burning, rashes, or lesions   LYMPH NODES: No enlarged glands  MUSCULOSKELETAL: No joint pain or swelling; No muscle, back, or extremity pain      GENERAL: NAD  HEAD:  Atraumatic, Normocephalic  EYES: EOMI, PERRLA, conjunctiva and sclera clear  ENT: moist mucous membranes  NECK: Supple, No JVD, Normal thyroid  NERVOUS SYSTEM:  Alert & Oriented X3, Good concentration;   CHEST/LUNG: No rales, rhonchi, wheezing, or rubs  HEART: Regular rate and rhythm; No murmurs, rubs, or gallops  ABDOMEN: Soft, Nontender, Nondistended; Bowel sounds present  EXTREMITIES: multiple deep decubiti stage 4.   SKIN: multiple decubiti stage 4.    Care Discussed with Consultants/Other Providers [x ] YES  [ ] NO

## 2018-10-08 NOTE — PROVIDER CONTACT NOTE (CRITICAL VALUE NOTIFICATION) - TEST AND RESULT REPORTED:
Abnormal lab, urine culture collected 10/5/2018. Final report shows > than 100,000 entero coccus faecalis vancomycin resistant present. Normal urogenital pravin present

## 2018-10-08 NOTE — PROGRESS NOTE ADULT - ATTENDING COMMENTS
Patient seen and examined at bedside, feels ok except for pain, which is chronic in nature.  He is on oxycodone home dose but says it's not enough. He is using Marijuana in addition.   Physical exam: unchanged, has a wilson today  HEENT: neck supple  heart: S1 S2 normal  Abdomen: NT, ND  Chest: Clear  LE: Bilateral LE waisting, contractures, multiple stage 4 decubiti  A/P  1- UTI: VRE growing, patient states long standing UTIs,   states having some symptoms prior to admission, but patient has paraplegia and compromized sensation anyway.   Infection disease consult  Continue zyvox for now.     2- stage 4 decubiti: wound care input appreciated  Secondary to paraplegia   surgery input appreciated  continue wound care  Add marinol to pain management.    Rest as above.

## 2018-10-09 DIAGNOSIS — F32.9 MAJOR DEPRESSIVE DISORDER, SINGLE EPISODE, UNSPECIFIED: ICD-10-CM

## 2018-10-09 LAB
ANION GAP SERPL CALC-SCNC: 8 MMOL/L — SIGNIFICANT CHANGE UP (ref 5–17)
BUN SERPL-MCNC: 10 MG/DL — SIGNIFICANT CHANGE UP (ref 7–18)
CALCIUM SERPL-MCNC: 9.2 MG/DL — SIGNIFICANT CHANGE UP (ref 8.4–10.5)
CHLORIDE SERPL-SCNC: 99 MMOL/L — SIGNIFICANT CHANGE UP (ref 96–108)
CO2 SERPL-SCNC: 28 MMOL/L — SIGNIFICANT CHANGE UP (ref 22–31)
CREAT SERPL-MCNC: 0.55 MG/DL — SIGNIFICANT CHANGE UP (ref 0.5–1.3)
GLUCOSE SERPL-MCNC: 100 MG/DL — HIGH (ref 70–99)
HCT VFR BLD CALC: 33.5 % — LOW (ref 39–50)
HGB BLD-MCNC: 10.3 G/DL — LOW (ref 13–17)
MAGNESIUM SERPL-MCNC: 1.9 MG/DL — SIGNIFICANT CHANGE UP (ref 1.6–2.6)
MCHC RBC-ENTMCNC: 24.3 PG — LOW (ref 27–34)
MCHC RBC-ENTMCNC: 30.8 GM/DL — LOW (ref 32–36)
MCV RBC AUTO: 78.9 FL — LOW (ref 80–100)
PHOSPHATE SERPL-MCNC: 4 MG/DL — SIGNIFICANT CHANGE UP (ref 2.5–4.5)
PLATELET # BLD AUTO: 258 K/UL — SIGNIFICANT CHANGE UP (ref 150–400)
POTASSIUM SERPL-MCNC: 3.8 MMOL/L — SIGNIFICANT CHANGE UP (ref 3.5–5.3)
POTASSIUM SERPL-SCNC: 3.8 MMOL/L — SIGNIFICANT CHANGE UP (ref 3.5–5.3)
RBC # BLD: 4.25 M/UL — SIGNIFICANT CHANGE UP (ref 4.2–5.8)
RBC # FLD: 16.8 % — HIGH (ref 10.3–14.5)
SODIUM SERPL-SCNC: 135 MMOL/L — SIGNIFICANT CHANGE UP (ref 135–145)
WBC # BLD: 4.1 K/UL — SIGNIFICANT CHANGE UP (ref 3.8–10.5)
WBC # FLD AUTO: 4.1 K/UL — SIGNIFICANT CHANGE UP (ref 3.8–10.5)

## 2018-10-09 PROCEDURE — 99232 SBSQ HOSP IP/OBS MODERATE 35: CPT | Mod: GC

## 2018-10-09 RX ORDER — NYSTATIN CREAM 100000 [USP'U]/G
1 CREAM TOPICAL
Qty: 0 | Refills: 0 | Status: DISCONTINUED | OUTPATIENT
Start: 2018-10-10 | End: 2018-11-01

## 2018-10-09 RX ORDER — AMOXICILLIN 250 MG/5ML
500 SUSPENSION, RECONSTITUTED, ORAL (ML) ORAL EVERY 8 HOURS
Qty: 0 | Refills: 0 | Status: COMPLETED | OUTPATIENT
Start: 2018-10-09 | End: 2018-10-15

## 2018-10-09 RX ORDER — NYSTATIN CREAM 100000 [USP'U]/G
1 CREAM TOPICAL
Qty: 0 | Refills: 0 | Status: DISCONTINUED | OUTPATIENT
Start: 2018-10-09 | End: 2018-10-09

## 2018-10-09 RX ORDER — AMOXICILLIN 250 MG/5ML
1 SUSPENSION, RECONSTITUTED, ORAL (ML) ORAL
Qty: 0 | Refills: 0 | COMMUNITY
Start: 2018-10-09

## 2018-10-09 RX ORDER — OXYCODONE HYDROCHLORIDE 5 MG/1
15 TABLET ORAL EVERY 8 HOURS
Qty: 0 | Refills: 0 | Status: DISCONTINUED | OUTPATIENT
Start: 2018-10-10 | End: 2018-10-17

## 2018-10-09 RX ORDER — DRONABINOL 2.5 MG
1 CAPSULE ORAL
Qty: 0 | Refills: 0 | COMMUNITY
Start: 2018-10-09

## 2018-10-09 RX ORDER — BUPROPION HYDROCHLORIDE 150 MG/1
150 TABLET, EXTENDED RELEASE ORAL DAILY
Qty: 0 | Refills: 0 | Status: DISCONTINUED | OUTPATIENT
Start: 2018-10-09 | End: 2018-11-01

## 2018-10-09 RX ORDER — BUPROPION HYDROCHLORIDE 150 MG/1
1 TABLET, EXTENDED RELEASE ORAL
Qty: 0 | Refills: 0 | COMMUNITY
Start: 2018-10-09

## 2018-10-09 RX ORDER — ZINC OXIDE 200 MG/G
1 OINTMENT TOPICAL ONCE
Qty: 0 | Refills: 0 | Status: COMPLETED | OUTPATIENT
Start: 2018-10-09 | End: 2018-10-09

## 2018-10-09 RX ADMIN — Medication 5 MILLIGRAM(S): at 17:13

## 2018-10-09 RX ADMIN — OXYCODONE HYDROCHLORIDE 15 MILLIGRAM(S): 5 TABLET ORAL at 15:41

## 2018-10-09 RX ADMIN — Medication 500 MILLIGRAM(S): at 15:41

## 2018-10-09 RX ADMIN — Medication 1 MILLIGRAM(S): at 11:34

## 2018-10-09 RX ADMIN — OXYCODONE HYDROCHLORIDE 15 MILLIGRAM(S): 5 TABLET ORAL at 08:28

## 2018-10-09 RX ADMIN — Medication 1 TABLET(S): at 11:34

## 2018-10-09 RX ADMIN — Medication 5 MILLIGRAM(S): at 05:48

## 2018-10-09 RX ADMIN — LINEZOLID 300 MILLIGRAM(S): 600 INJECTION, SOLUTION INTRAVENOUS at 05:48

## 2018-10-09 RX ADMIN — ZINC OXIDE 1 APPLICATION(S): 200 OINTMENT TOPICAL at 13:23

## 2018-10-09 RX ADMIN — Medication 20 MILLIGRAM(S): at 22:01

## 2018-10-09 RX ADMIN — Medication 500 MILLIGRAM(S): at 22:01

## 2018-10-09 RX ADMIN — ZINC SULFATE TAB 220 MG (50 MG ZINC EQUIVALENT) 220 MILLIGRAM(S): 220 (50 ZN) TAB at 11:34

## 2018-10-09 RX ADMIN — BUPROPION HYDROCHLORIDE 150 MILLIGRAM(S): 150 TABLET, EXTENDED RELEASE ORAL at 12:06

## 2018-10-09 RX ADMIN — OXYCODONE HYDROCHLORIDE 15 MILLIGRAM(S): 5 TABLET ORAL at 07:40

## 2018-10-09 RX ADMIN — OXYCODONE HYDROCHLORIDE 15 MILLIGRAM(S): 5 TABLET ORAL at 16:41

## 2018-10-09 RX ADMIN — ENOXAPARIN SODIUM 40 MILLIGRAM(S): 100 INJECTION SUBCUTANEOUS at 05:48

## 2018-10-09 NOTE — PROGRESS NOTE ADULT - PROBLEM SELECTOR PLAN 3
Likely multifactorial, pt's current situation + pain  Psych input appreciated (Dr. Paz); recommends Wellbutrin. Can start Lexapro once linezolid is finished(concern for serotonin release syndrome).

## 2018-10-09 NOTE — CHART NOTE - NSCHARTNOTEFT_GEN_A_CORE
Assessment:   28yMalePatient is a 28y old  Male who presents with a chief complaint of Pain of sacral and heel wounds (09 Oct 2018 07:54)      Factors impacting intake: [ ] none [ ] nausea  [ ] vomiting [ ] diarrhea [ ] constipation  [ ]chewing problems [ ] swallowing issues  [x ] other: Patient reports adequate intake of meals. Also drinking 3 cans of ensure/d. colostomy output normal per RN.     Diet Presciption: Diet, Regular:   Supplement Feeding Modality:  Oral  Ensure Enlive Cans or Servings Per Day:  1       Frequency:  Three Times a day (10-03-18 @ 10:17)    Intake: adequate    Current Weight: none   % Weight Change    Pertinent Medications: MEDICATIONS  (STANDING):  buPROPion XL . 150 milliGRAM(s) Oral daily  dronabinol 5 milliGRAM(s) Oral two times a day  enoxaparin Injectable 40 milliGRAM(s) SubCutaneous every 24 hours  folic acid 1 milliGRAM(s) Oral daily  linezolid  IVPB 600 milliGRAM(s) IV Intermittent every 12 hours  linezolid  IVPB      multivitamin 1 Tablet(s) Oral daily  zinc oxide 20% Ointment 1 Application(s) Topical once  zinc sulfate 220 milliGRAM(s) Oral daily    MEDICATIONS  (PRN):  baclofen 20 milliGRAM(s) Oral two times a day PRN Muscle Spasm  ondansetron Injectable 4 milliGRAM(s) IV Push every 6 hours PRN Nausea  oxyCODONE    IR 15 milliGRAM(s) Oral every 8 hours PRN Severe Pain (7 - 10)  senna 2 Tablet(s) Oral at bedtime PRN Constipation    Pertinent Labs: 10-09 Na135 mmol/L Glu 100 mg/dL<H> K+ 3.8 mmol/L Cr  0.55 mg/dL BUN 10 mg/dL 10-09 Phos 4.0 mg/dL 10-03 Alb 2.7 g/dL<L> 10-03 [id number] 5.1 % 10-03 Chol 126 mg/dL LDL 67 mg/dL HDL 50 mg/dL Trig 45 mg/dL     CAPILLARY BLOOD GLUCOSE        Skin: Multiple pressure ulcers,     Estimated Needs:   [x ] no change since previous assessment  [ ] recalculated:     Previous Nutrition Diagnosis:   [ ] Inadequate Energy Intake [ ]Inadequate Oral Intake [ ] Excessive Energy Intake   [ ] Underweight [ ] Increased Nutrient Needs [ ] Overweight/Obesity   [ ] Altered GI Function [ ] Unintended Weight Loss [ ] Food & Nutrition Related Knowledge Deficit [x ] Malnutrition , severe     Nutrition Diagnosis is [x ] ongoing  [ ] resolved [ ] not applicable     New Nutrition Diagnosis: [ ] not applicable       Interventions:   Recommend  [ ] Change Diet To:  [ ] Nutrition Supplement  [ ] Nutrition Support  [ ] Other: continue with Regular diet and ensure enlive tid. change multivitamin to multivitamin/minerals 1 tab/d and add vitamin C 500mg bid to assist with healing . continue with zinc sulfate as needed     Monitoring and Evaluation:   [x ] PO intake [ x ] Tolerance to diet prescription [ x ] weights [ x ] labs[ x ] follow up per protocol  [ ] other:
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
## 2018-10-09 NOTE — PROGRESS NOTE ADULT - ATTENDING COMMENTS
Patient was seen and examined at bedside, feels ok  still complaints of back pain but was sleepy when seen    Physical exam: unchanged     A/P  1- UTI: VRE growing, patient states long standing UTIs,   states having some symptoms prior to admission.   Infection disease consult appreciated.   Continue zyvox for now.     2- stage 4 decubiti: wound care input appreciated  Secondary to paraplegia   surgery input appreciated  continue wound care  Pain management for pain control     Rest as above.
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
## 2018-10-09 NOTE — PROGRESS NOTE ADULT - SUBJECTIVE AND OBJECTIVE BOX
Patient is a 28y old  Male who presents with a chief complaint of Pain of sacral and heel wounds (08 Oct 2018 07:51)      INTERVAL HPI/OVERNIGHT EVENTS: generalized body pain, marinol added  Pt was evaluated by psych for depressive symptoms.    T(C): 36.9 (10-09-18 @ 05:57), Max: 37.5 (10-08-18 @ 20:26)  HR: 75 (10-09-18 @ 05:57) (69 - 80)  BP: 141/67 (10-09-18 @ 05:57) (95/51 - 147/67)  RR: 18 (10-09-18 @ 05:57) (18 - 18)  SpO2: 100% (10-09-18 @ 05:57) (100% - 100%)  Wt(kg): --  I&O's Summary      LABS:  Pending today        EDICATIONS  (STANDING):  dronabinol 5 milliGRAM(s) Oral two times a day  enoxaparin Injectable 40 milliGRAM(s) SubCutaneous every 24 hours  folic acid 1 milliGRAM(s) Oral daily  linezolid  IVPB 600 milliGRAM(s) IV Intermittent every 12 hours  linezolid  IVPB      multivitamin 1 Tablet(s) Oral daily  zinc sulfate 220 milliGRAM(s) Oral daily    MEDICATIONS  (PRN):  baclofen 20 milliGRAM(s) Oral two times a day PRN Muscle Spasm  ondansetron Injectable 4 milliGRAM(s) IV Push every 6 hours PRN Nausea  oxyCODONE    IR 15 milliGRAM(s) Oral every 8 hours PRN Severe Pain (7 - 10)  senna 2 Tablet(s) Oral at bedtime PRN Constipation      RADIOLOGY & ADDITIONAL TESTS:    Imaging Personally Reviewed:  [x ] YES  [ ] NO    Consultant(s) Notes Reviewed:  [x ] YES  [ ] NO      REVIEW OF SYSTEMS:  CONSTITUTIONAL: No fever, weight loss, + fatigue, generalized pain  EYES: No eye pain, visual disturbances, or discharge  ENMT:  No difficulty hearing, tinnitus, vertigo; No sinus or throat pain  NECK: No pain or stiffness  RESPIRATORY: No cough, wheezing, chills or hemoptysis; No shortness of breath  CARDIOVASCULAR: No chest pain, palpitations, dizziness, or leg swelling  GASTROINTESTINAL: No abdominal or epigastric pain. No nausea, vomiting, or hematemesis; No diarrhea or constipation. No melena or hematochezia.  GENITOURINARY: No dysuria, frequency, hematuria, or incontinence  NEUROLOGICAL: No headaches, memory loss, loss of strength, numbness, or tremors      GENERAL: NAD  HEAD:  Atraumatic, Normocephalic  EYES: EOMI, PERRLA, conjunctiva and sclera clear  ENT: moist mucous membranes  NECK: Supple, No JVD, Normal thyroid  NERVOUS SYSTEM:  Alert & Oriented X3, Good concentration  CHEST/LUNG: No rales, rhonchi, wheezing, or rubs  HEART: Regular rate and rhythm; No murmurs, rubs, or gallops  ABDOMEN: Soft, Nontender, Nondistended; Bowel sounds present  EXTREMITIES: multiple deep decubiti stage 4.   SKIN: multiple decubiti stage 4.    Care Discussed with Consultants/Other Providers [x ] YES  [ ] NO    Care Discussed with Consultants/Other Providers [ ] YES  [ ] NO Patient is a 28y old  Male who presents with a chief complaint of Pain of sacral and heel wounds (08 Oct 2018 07:51)      INTERVAL HPI/OVERNIGHT EVENTS: generalized body pain, marinol added  Pt was evaluated by psych for depressive symptoms.    T(C): 36.9 (10-09-18 @ 05:57), Max: 37.5 (10-08-18 @ 20:26)  HR: 75 (10-09-18 @ 05:57) (69 - 80)  BP: 141/67 (10-09-18 @ 05:57) (95/51 - 147/67)  RR: 18 (10-09-18 @ 05:57) (18 - 18)  SpO2: 100% (10-09-18 @ 05:57) (100% - 100%)  Wt(kg): --  I&O's Summary      LABS:                        10.3   4.1   )-----------( 258      ( 09 Oct 2018 07:42 )             33.5     10-09    135  |  99  |  10  ----------------------------<  100<H>  3.8   |  28  |  0.55    Ca    9.2      09 Oct 2018 07:42  Phos  4.0     10-09  Mg     1.9     10-09            EDICATIONS  (STANDING):  dronabinol 5 milliGRAM(s) Oral two times a day  enoxaparin Injectable 40 milliGRAM(s) SubCutaneous every 24 hours  folic acid 1 milliGRAM(s) Oral daily  linezolid  IVPB 600 milliGRAM(s) IV Intermittent every 12 hours  linezolid  IVPB      multivitamin 1 Tablet(s) Oral daily  zinc sulfate 220 milliGRAM(s) Oral daily    MEDICATIONS  (PRN):  baclofen 20 milliGRAM(s) Oral two times a day PRN Muscle Spasm  ondansetron Injectable 4 milliGRAM(s) IV Push every 6 hours PRN Nausea  oxyCODONE    IR 15 milliGRAM(s) Oral every 8 hours PRN Severe Pain (7 - 10)  senna 2 Tablet(s) Oral at bedtime PRN Constipation      RADIOLOGY & ADDITIONAL TESTS:    Imaging Personally Reviewed:  [x ] YES  [ ] NO    Consultant(s) Notes Reviewed:  [x ] YES  [ ] NO      REVIEW OF SYSTEMS:  CONSTITUTIONAL: No fever, weight loss, + fatigue, generalized pain  EYES: No eye pain, visual disturbances, or discharge  ENMT:  No difficulty hearing, tinnitus, vertigo; No sinus or throat pain  NECK: No pain or stiffness  RESPIRATORY: No cough, wheezing, chills or hemoptysis; No shortness of breath  CARDIOVASCULAR: No chest pain, palpitations, dizziness, or leg swelling  GASTROINTESTINAL: No abdominal or epigastric pain. No nausea, vomiting, or hematemesis; No diarrhea or constipation. No melena or hematochezia.  GENITOURINARY: No dysuria, frequency, hematuria, or incontinence  NEUROLOGICAL: No headaches, memory loss, loss of strength, numbness, or tremors      GENERAL: NAD  HEAD:  Atraumatic, Normocephalic  EYES: EOMI, PERRLA, conjunctiva and sclera clear  ENT: moist mucous membranes  NECK: Supple, No JVD, Normal thyroid  NERVOUS SYSTEM:  Alert & Oriented X3, Good concentration  CHEST/LUNG: No rales, rhonchi, wheezing, or rubs  HEART: Regular rate and rhythm; No murmurs, rubs, or gallops  ABDOMEN: Soft, Nontender, Nondistended; Bowel sounds present  EXTREMITIES: multiple deep decubiti stage 4.   SKIN: multiple decubiti stage 4.    Care Discussed with Consultants/Other Providers [x ] YES  [ ] NO    Care Discussed with Consultants/Other Providers [ ] YES  [ ] NO

## 2018-10-09 NOTE — PROGRESS NOTE ADULT - PROBLEM SELECTOR PLAN 2
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 Ischium (1cm x 3cm x 0.4cm) with pink tissue, drainage, and white wound edges  Stage 4 Pressure Injury to the Sacrum (5cm x 7cm x 1cm) with pink tissue, bone, drainage, white wound edges, and undermining (up to 3cm at 1-8 o'clock)  Stage 4 Pressure Injury to the R. Ischium (3cm x 1cm x 1cm) with granulation tissue, bone, drainage, white wound edges, and undermining (up to 5cm at 360 degrees  Stage 3 Pressure Injury to the R. Hip (9cm x 6cm) with granulation tissue, pink tissue, drainage, and white wound edges	  Bilateral Lower Extremities Ulcerations, to which the patient is being followed by Podiatry with a treatment plan to be formulated to address these issues  podiatry consult - outpatient podiatry  surgery consult - signed off, no surgical intervention needed  Currently on ABx for UTI only, does not require ABx for chronic wounds  Wound care daily  Medical Marijuana was added to control generalized pain, as oxycodone does not provide sufficient pain control.

## 2018-10-09 NOTE — PROGRESS NOTE ADULT - PROBLEM SELECTOR PLAN 7
IMPROVE VTE Individual Risk Assessment          RISK                                                          Points  [  ] Previous VTE                                                3  [  ] Thrombophilia                                             2  [ x ] Lower limb paralysis                                   2        (unable to hold up >15 seconds)    [  ] Current Cancer                                             2         (within 6 months)  [x  ] Immobilization > 24 hrs                              1  [  ] ICU/CCU stay > 24 hours                             1  [  ] Age > 60                                                         1    IMPROVE VTE Score: 3, dvt ppx    DC plan to NH, needs ID clearance before discharge.

## 2018-10-09 NOTE — CONSULT NOTE ADULT - REASON FOR ADMISSION
Pain of sacral and heel wounds

## 2018-10-09 NOTE — CONSULT NOTE ADULT - ASSESSMENT
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 No evidence of infection  2. No surgical debridement needed at this time  3. Continue wound care as recommended by WOUND CARE TEAM  4. Will d/w Dr. Garcia
UTI    plan - dc zyvox  start amoxicillin 500mgs po tid x 6 days  dc planning  reconsult prn

## 2018-10-10 PROCEDURE — 99232 SBSQ HOSP IP/OBS MODERATE 35: CPT | Mod: GC

## 2018-10-10 RX ADMIN — OXYCODONE HYDROCHLORIDE 15 MILLIGRAM(S): 5 TABLET ORAL at 21:58

## 2018-10-10 RX ADMIN — Medication 5 MILLIGRAM(S): at 18:00

## 2018-10-10 RX ADMIN — ZINC SULFATE TAB 220 MG (50 MG ZINC EQUIVALENT) 220 MILLIGRAM(S): 220 (50 ZN) TAB at 11:44

## 2018-10-10 RX ADMIN — Medication 500 MILLIGRAM(S): at 13:03

## 2018-10-10 RX ADMIN — Medication 500 MILLIGRAM(S): at 21:57

## 2018-10-10 RX ADMIN — OXYCODONE HYDROCHLORIDE 15 MILLIGRAM(S): 5 TABLET ORAL at 22:30

## 2018-10-10 RX ADMIN — Medication 20 MILLIGRAM(S): at 21:57

## 2018-10-10 RX ADMIN — Medication 1 TABLET(S): at 11:44

## 2018-10-10 RX ADMIN — OXYCODONE HYDROCHLORIDE 15 MILLIGRAM(S): 5 TABLET ORAL at 00:45

## 2018-10-10 RX ADMIN — Medication 500 MILLIGRAM(S): at 06:43

## 2018-10-10 RX ADMIN — Medication 5 MILLIGRAM(S): at 06:43

## 2018-10-10 RX ADMIN — OXYCODONE HYDROCHLORIDE 15 MILLIGRAM(S): 5 TABLET ORAL at 13:03

## 2018-10-10 RX ADMIN — OXYCODONE HYDROCHLORIDE 15 MILLIGRAM(S): 5 TABLET ORAL at 14:10

## 2018-10-10 RX ADMIN — OXYCODONE HYDROCHLORIDE 15 MILLIGRAM(S): 5 TABLET ORAL at 00:15

## 2018-10-10 RX ADMIN — ENOXAPARIN SODIUM 40 MILLIGRAM(S): 100 INJECTION SUBCUTANEOUS at 06:43

## 2018-10-10 RX ADMIN — NYSTATIN CREAM 1 APPLICATION(S): 100000 CREAM TOPICAL at 08:14

## 2018-10-10 RX ADMIN — BUPROPION HYDROCHLORIDE 150 MILLIGRAM(S): 150 TABLET, EXTENDED RELEASE ORAL at 11:44

## 2018-10-10 RX ADMIN — Medication 1 MILLIGRAM(S): at 11:44

## 2018-10-10 NOTE — PROGRESS NOTE ADULT - PROBLEM SELECTOR PLAN 1
+UA, and pt is symptomatic  UCx VRE - ID consult appreciated, recommends augmentin PO for 6 days.  No need for isolation as per infection control department.  ID Dr. Orlando

## 2018-10-10 NOTE — PROGRESS NOTE ADULT - PROBLEM SELECTOR PLAN 2
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 Ischium (1cm x 3cm x 0.4cm) with pink tissue, drainage, and white wound edges  Stage 4 Pressure Injury to the Sacrum (5cm x 7cm x 1cm) with pink tissue, bone, drainage, white wound edges, and undermining (up to 3cm at 1-8 o'clock)  Stage 4 Pressure Injury to the R. Ischium (3cm x 1cm x 1cm) with granulation tissue, bone, drainage, white wound edges, and undermining (up to 5cm at 360 degrees  Stage 3 Pressure Injury to the R. Hip (9cm x 6cm) with granulation tissue, pink tissue, drainage, and white wound edges	  Bilateral Lower Extremities Ulcerations, f/u with Podiatry   daily dressing and off load boots for foot ulcers.  surgery consult - signed off, no surgical intervention needed  Currently on ABx for UTI only, does not require ABx for chronic wounds  Medical Marijuana was added to control generalized pain, as oxycodone does not provide sufficient pain control. IR oxycontin dose increased.  Pain consult to NP, awaiting input.

## 2018-10-10 NOTE — PROGRESS NOTE ADULT - ATTENDING COMMENTS
Patient was seen and examined at bedside,   still complaints of back pain but was sleeping when seen, says did not sleep last night.     Physical exam:    Vital Signs Last 24 Hrs  T(C): 37 (10 Oct 2018 05:32), Max: 37 (10 Oct 2018 05:32)  T(F): 98.6 (10 Oct 2018 05:32), Max: 98.6 (10 Oct 2018 05:32)  HR: 71 (10 Oct 2018 05:32) (71 - 71)  BP: 99/48 (10 Oct 2018 05:32) (99/48 - 127/58)  BP(mean): --  RR: 18 (10 Oct 2018 05:32) (18 - 18)  SpO2: 100% (10 Oct 2018 05:32) (100% - 100%)    HEENT: Neck supple  heart: s1 S 2normal  Abdomen: NT< ND  chest: Clear  LE: No LE edema  skin: multiple decubiti stage 4.     A/P  1- UTI: VRE growing, patient states long standing UTIs,   states having some symptoms prior to admission.   Infection disease consult appreciated.   continue Augmentin.     2- stage 4 decubiti: wound care input appreciated  Secondary to paraplegia   surgery input appreciated  continue wound care  Pain management for pain control     3- depression: continue wellbutrin.    Rest as above.

## 2018-10-10 NOTE — PROGRESS NOTE ADULT - PROBLEM SELECTOR PLAN 3
Likely multifactorial, pt's current situation + pain  Psych input appreciated (Dr. Paz); recommends Wellbutrin. Can start Lexapro once linezolid is finished(concern for serotonin release syndrome).  c/w Wellbutrin, pt agreed with starting antipsychotics

## 2018-10-10 NOTE — PROGRESS NOTE ADULT - PROBLEM SELECTOR PLAN 7
IMPROVE VTE Individual Risk Assessment          RISK                                                          Points  [  ] Previous VTE                                                3  [  ] Thrombophilia                                             2  [ x ] Lower limb paralysis                                   2        (unable to hold up >15 seconds)    [  ] Current Cancer                                             2         (within 6 months)  [x  ] Immobilization > 24 hrs                              1  [  ] ICU/CCU stay > 24 hours                             1  [  ] Age > 60                                                         1    IMPROVE VTE Score: 3, dvt ppx    DC plan to NH

## 2018-10-10 NOTE — PROGRESS NOTE ADULT - SUBJECTIVE AND OBJECTIVE BOX
Patient is a 28y old  Male who presents with a chief complaint of Pain of sacral and heel wounds (09 Oct 2018 20:05)      INTERVAL HPI/OVERNIGHT EVENTS: Back and shoulder pain, penile skin abrasion likely due to sheer effect from condom cath    T(C): 37 (10-10-18 @ 05:32), Max: 37 (10-10-18 @ 05:32)  HR: 71 (10-10-18 @ 05:32) (71 - 72)  BP: 99/48 (10-10-18 @ 05:32) (99/48 - 127/58)  RR: 18 (10-10-18 @ 05:32) (18 - 18)  SpO2: 100% (10-10-18 @ 05:32) (100% - 100%)  Wt(kg): --  I&O's Summary    09 Oct 2018 07:01  -  10 Oct 2018 07:00  --------------------------------------------------------  IN: 0 mL / OUT: 2100 mL / NET: -2100 mL        LABS:                        10.3   4.1   )-----------( 258      ( 09 Oct 2018 07:42 )             33.5     10-09    135  |  99  |  10  ----------------------------<  100<H>  3.8   |  28  |  0.55    Ca    9.2      09 Oct 2018 07:42  Phos  4.0     10-09  Mg     1.9     10-09          CAPILLARY BLOOD GLUCOSE            MEDICATIONS  (STANDING):  amoxicillin      Capsule 500 milliGRAM(s) Oral every 8 hours  buPROPion XL . 150 milliGRAM(s) Oral daily  dronabinol 5 milliGRAM(s) Oral two times a day  enoxaparin Injectable 40 milliGRAM(s) SubCutaneous every 24 hours  folic acid 1 milliGRAM(s) Oral daily  multivitamin 1 Tablet(s) Oral daily  nystatin Cream 1 Application(s) Topical <User Schedule>  zinc sulfate 220 milliGRAM(s) Oral daily    MEDICATIONS  (PRN):  baclofen 20 milliGRAM(s) Oral two times a day PRN Muscle Spasm  ondansetron Injectable 4 milliGRAM(s) IV Push every 6 hours PRN Nausea  oxyCODONE    IR 15 milliGRAM(s) Oral every 8 hours PRN Severe Pain (7 - 10)  senna 2 Tablet(s) Oral at bedtime PRN Constipation      RADIOLOGY & ADDITIONAL TESTS:    Imaging Personally Reviewed:  [x ] YES  [ ] NO    Consultant(s) Notes Reviewed:  [x ] YES  [ ] NO    REVIEW OF SYSTEMS:  CONSTITUTIONAL: No fever, weight loss, + fatigue, generalized pain  EYES: No eye pain, visual disturbances, or discharge  ENMT:  No difficulty hearing, tinnitus, vertigo; No sinus or throat pain  NECK: No pain or stiffness  RESPIRATORY: No cough, wheezing, chills or hemoptysis; No shortness of breath  CARDIOVASCULAR: No chest pain, palpitations, dizziness, or leg swelling  GASTROINTESTINAL: No abdominal or epigastric pain. No nausea, vomiting, or hematemesis; No diarrhea or constipation. No melena or hematochezia.  GENITOURINARY: No dysuria, frequency, hematuria, or incontinence  NEUROLOGICAL: No headaches, memory loss, loss of strength, numbness, or tremors      GENERAL: NAD  HEAD:  Atraumatic, Normocephalic  EYES: EOMI, PERRLA, conjunctiva and sclera clear  ENT: moist mucous membranes  NECK: Supple, No JVD, Normal thyroid  NERVOUS SYSTEM:  Alert & Oriented X3, Good concentration  CHEST/LUNG: No rales, rhonchi, wheezing, or rubs  HEART: Regular rate and rhythm; No murmurs, rubs, or gallops  ABDOMEN: Soft, Nontender, Nondistended; Bowel sounds present  EXTREMITIES: multiple deep decubiti stage 4.   SKIN: multiple decubiti stage 4.    Care Discussed with Consultants/Other Providers [ x] YES  [ ] NO

## 2018-10-11 PROCEDURE — 99233 SBSQ HOSP IP/OBS HIGH 50: CPT

## 2018-10-11 RX ORDER — GABAPENTIN 400 MG/1
2 CAPSULE ORAL
Qty: 0 | Refills: 0 | COMMUNITY
Start: 2018-10-11

## 2018-10-11 RX ORDER — KETOROLAC TROMETHAMINE 30 MG/ML
30 SYRINGE (ML) INJECTION EVERY 6 HOURS
Qty: 0 | Refills: 0 | Status: DISCONTINUED | OUTPATIENT
Start: 2018-10-11 | End: 2018-10-12

## 2018-10-11 RX ORDER — SENNA PLUS 8.6 MG/1
2 TABLET ORAL AT BEDTIME
Qty: 0 | Refills: 0 | Status: DISCONTINUED | OUTPATIENT
Start: 2018-10-11 | End: 2018-11-01

## 2018-10-11 RX ORDER — GABAPENTIN 400 MG/1
200 CAPSULE ORAL EVERY 12 HOURS
Qty: 0 | Refills: 0 | Status: DISCONTINUED | OUTPATIENT
Start: 2018-10-11 | End: 2018-10-26

## 2018-10-11 RX ADMIN — NYSTATIN CREAM 1 APPLICATION(S): 100000 CREAM TOPICAL at 08:15

## 2018-10-11 RX ADMIN — OXYCODONE HYDROCHLORIDE 15 MILLIGRAM(S): 5 TABLET ORAL at 05:44

## 2018-10-11 RX ADMIN — ENOXAPARIN SODIUM 40 MILLIGRAM(S): 100 INJECTION SUBCUTANEOUS at 05:21

## 2018-10-11 RX ADMIN — OXYCODONE HYDROCHLORIDE 15 MILLIGRAM(S): 5 TABLET ORAL at 06:14

## 2018-10-11 RX ADMIN — Medication 1 MILLIGRAM(S): at 13:03

## 2018-10-11 RX ADMIN — GABAPENTIN 200 MILLIGRAM(S): 400 CAPSULE ORAL at 17:14

## 2018-10-11 RX ADMIN — Medication 5 MILLIGRAM(S): at 05:19

## 2018-10-11 RX ADMIN — Medication 500 MILLIGRAM(S): at 05:19

## 2018-10-11 RX ADMIN — Medication 500 MILLIGRAM(S): at 21:22

## 2018-10-11 RX ADMIN — Medication 1 TABLET(S): at 13:03

## 2018-10-11 RX ADMIN — Medication 5 MILLIGRAM(S): at 17:14

## 2018-10-11 RX ADMIN — BUPROPION HYDROCHLORIDE 150 MILLIGRAM(S): 150 TABLET, EXTENDED RELEASE ORAL at 13:03

## 2018-10-11 RX ADMIN — Medication 20 MILLIGRAM(S): at 13:04

## 2018-10-11 RX ADMIN — OXYCODONE HYDROCHLORIDE 15 MILLIGRAM(S): 5 TABLET ORAL at 14:01

## 2018-10-11 RX ADMIN — Medication 500 MILLIGRAM(S): at 13:03

## 2018-10-11 RX ADMIN — OXYCODONE HYDROCHLORIDE 15 MILLIGRAM(S): 5 TABLET ORAL at 15:00

## 2018-10-11 RX ADMIN — ZINC SULFATE TAB 220 MG (50 MG ZINC EQUIVALENT) 220 MILLIGRAM(S): 220 (50 ZN) TAB at 13:03
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
## 2018-10-11 NOTE — PROGRESS NOTE ADULT - SUBJECTIVE AND OBJECTIVE BOX
NP Note discussed with  Primary Attending    Patient is a 28y old  Male who presents with a chief complaint of Pain of sacral and heel wounds (11 Oct 2018 11:22).  Pt with chronic back and shoulder pain. Pt also has a chronic sacral wound.  Pt states that pain is about a 7/10.  He takes oxycodone 15mg po prn and smokes marijuana at home for pain.  Pt is on abx for uti and has wound care for sacral decubitus.       INTERVAL HPI/OVERNIGHT EVENTS: no new complaints    MEDICATIONS  (STANDING):  amoxicillin      Capsule 500 milliGRAM(s) Oral every 8 hours  buPROPion XL . 150 milliGRAM(s) Oral daily  dronabinol 5 milliGRAM(s) Oral two times a day  enoxaparin Injectable 40 milliGRAM(s) SubCutaneous every 24 hours  folic acid 1 milliGRAM(s) Oral daily  gabapentin 200 milliGRAM(s) Oral every 12 hours  multivitamin 1 Tablet(s) Oral daily  nystatin Cream 1 Application(s) Topical <User Schedule>  zinc sulfate 220 milliGRAM(s) Oral daily    MEDICATIONS  (PRN):  baclofen 20 milliGRAM(s) Oral two times a day PRN Muscle Spasm  ketorolac   Injectable 30 milliGRAM(s) IV Push every 6 hours PRN Moderate Pain (4 - 6)  ondansetron Injectable 4 milliGRAM(s) IV Push every 6 hours PRN Nausea  oxyCODONE    IR 15 milliGRAM(s) Oral every 8 hours PRN Severe Pain (7 - 10)  senna 2 Tablet(s) Oral at bedtime PRN Constipation      __________________________________________________  REVIEW OF SYSTEMS:    CONSTITUTIONAL: No fever,   RESPIRATORY: No cough; No shortness of breath  CARDIOVASCULAR: No chest pain, no palpitations  GASTROINTESTINAL: No pain. No nausea or vomiting; No diarrhea   NEUROLOGICAL: + paraplegia   MUSCULOSKELETAL: + chronic shoulder pain, + lower back pain, + sacral pain  GENITOURINARY: no dysuria, no frequency, no hesitancy        Vital Signs Last 24 Hrs  T(C): 36.8 (11 Oct 2018 05:22), Max: 36.9 (10 Oct 2018 20:30)  T(F): 98.2 (11 Oct 2018 05:22), Max: 98.4 (10 Oct 2018 20:30)  HR: 73 (11 Oct 2018 06:42) (64 - 85)  BP: 118/66 (11 Oct 2018 06:42) (118/66 - 179/95)  BP(mean): --  RR: 18 (11 Oct 2018 06:42) (16 - 18)  SpO2: 100% (11 Oct 2018 06:42) (98% - 100%)    ________________________________________________  PHYSICAL EXAM:  GENERAL: NAD  HEART: S1 S2  regular; no murmurs, gallops or rubs  ABDOMEN: Soft, Nontender, Nondistended; Bowel sounds present  EXTREMITIES: no cyanosis; no edema; no calf tenderness  NERVOUS SYSTEM:  Awake and alert; Oriented  to place, person and time ; no new deficits    _________________________________________________  LABS:              CAPILLARY BLOOD GLUCOSE            RADIOLOGY & ADDITIONAL TESTS:    Imaging Personally Reviewed:  YES/NO    Consultant(s) Notes Reviewed:   YES/ No    Care Discussed with Consultants :     Plan of care was discussed with patient and /or primary care giver; all questions and concerns were addressed and care was aligned with patient's wishes.

## 2018-10-11 NOTE — PROGRESS NOTE ADULT - PROBLEM SELECTOR PLAN 1
+UA, and pt is symptomatic  UCx VRE - ID consult appreciated, recommends augmentin PO. continue PO Abx for now.  No need for isolation as per infection control department.  ID Dr. Orlando

## 2018-10-11 NOTE — PROGRESS NOTE ADULT - PROBLEM SELECTOR PLAN 7
IMPROVE VTE Individual Risk Assessment          RISK                                                          Points  [  ] Previous VTE                                                3  [  ] Thrombophilia                                             2  [ x ] Lower limb paralysis                                   2        (unable to hold up >15 seconds)    [  ] Current Cancer                                             2         (within 6 months)  [x  ] Immobilization > 24 hrs                              1  [  ] ICU/CCU stay > 24 hours                             1  [  ] Age > 60                                                         1    IMPROVE VTE Score: 3, dvt ppx    DC plan to NH once place is decided.

## 2018-10-11 NOTE — PROGRESS NOTE ADULT - PROBLEM SELECTOR PLAN 1
- continue with oxycodone 15mg po q 8 hours prn  - toradol 30mg ivp q 6 hours prn  - gabapentin 200mg po q 12 hours  - stool softeners  - continue with baclofen prn  - pt uses regular marijuana for pain relief not medical marijuana    - iv acetam

## 2018-10-11 NOTE — PROGRESS NOTE ADULT - SUBJECTIVE AND OBJECTIVE BOX
Patient is a 28y old  Male who presents with a chief complaint of Pain of sacral and heel wounds (10 Oct 2018 08:22)      INTERVAL HPI/OVERNIGHT EVENTS: Stable, pain much improved.    T(C): 36.8 (10-11-18 @ 05:22), Max: 36.9 (10-10-18 @ 20:30)  HR: 73 (10-11-18 @ 06:42) (64 - 85)  BP: 118/66 (10-11-18 @ 06:42) (118/66 - 179/95)  RR: 18 (10-11-18 @ 06:42) (16 - 18)  SpO2: 100% (10-11-18 @ 06:42) (98% - 100%)  Wt(kg): --  I&O's Summary    10 Oct 2018 07:01  -  11 Oct 2018 07:00  --------------------------------------------------------  IN: 0 mL / OUT: 675 mL / NET: -675 mL        LABS: No labs today              MEDICATIONS  (STANDING):  amoxicillin      Capsule 500 milliGRAM(s) Oral every 8 hours  buPROPion XL . 150 milliGRAM(s) Oral daily  dronabinol 5 milliGRAM(s) Oral two times a day  enoxaparin Injectable 40 milliGRAM(s) SubCutaneous every 24 hours  folic acid 1 milliGRAM(s) Oral daily  multivitamin 1 Tablet(s) Oral daily  nystatin Cream 1 Application(s) Topical <User Schedule>  zinc sulfate 220 milliGRAM(s) Oral daily    MEDICATIONS  (PRN):  baclofen 20 milliGRAM(s) Oral two times a day PRN Muscle Spasm  ondansetron Injectable 4 milliGRAM(s) IV Push every 6 hours PRN Nausea  oxyCODONE    IR 15 milliGRAM(s) Oral every 8 hours PRN Severe Pain (7 - 10)  senna 2 Tablet(s) Oral at bedtime PRN Constipation        REVIEW OF SYSTEMS:  CONSTITUTIONAL: No fever, weight loss, + fatigue, generalized pain  EYES: No eye pain, visual disturbances, or discharge  ENMT:  No difficulty hearing, tinnitus, vertigo; No sinus or throat pain  NECK: No pain or stiffness  RESPIRATORY: No cough, wheezing, chills or hemoptysis; No shortness of breath  CARDIOVASCULAR: No chest pain, palpitations, dizziness, or leg swelling  GASTROINTESTINAL: No abdominal or epigastric pain. No nausea, vomiting, or hematemesis; No diarrhea or constipation. No melena or hematochezia.  GENITOURINARY: No dysuria, frequency, hematuria, or incontinence  NEUROLOGICAL: No headaches, memory loss, loss of strength, numbness, or tremors      GENERAL: NAD  HEAD:  Atraumatic, Normocephalic  EYES: EOMI, PERRLA, conjunctiva and sclera clear  ENT: moist mucous membranes  NECK: Supple, No JVD, Normal thyroid  NERVOUS SYSTEM:  Alert & Oriented X3, Good concentration  CHEST/LUNG: No rales, rhonchi, wheezing, or rubs  HEART: Regular rate and rhythm; No murmurs, rubs, or gallops  ABDOMEN: Soft, Nontender, Nondistended; Bowel sounds present  EXTREMITIES: multiple deep decubiti stage 4.   SKIN: multiple decubiti stage 4.    Care Discussed with Consultants/Other Providers [ x] YES  [ ] NO

## 2018-10-11 NOTE — PROGRESS NOTE ADULT - PROBLEM SELECTOR PLAN 2
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 Ischium (1cm x 3cm x 0.4cm) with pink tissue, drainage, and white wound edges  Stage 4 Pressure Injury to the Sacrum (5cm x 7cm x 1cm) with pink tissue, bone, drainage, white wound edges, and undermining (up to 3cm at 1-8 o'clock)  Stage 4 Pressure Injury to the R. Ischium (3cm x 1cm x 1cm) with granulation tissue, bone, drainage, white wound edges, and undermining (up to 5cm at 360 degrees  Stage 3 Pressure Injury to the R. Hip (9cm x 6cm) with granulation tissue, pink tissue, drainage, and white wound edges	  Bilateral Lower Extremities Ulcerations, f/u with Podiatry   daily dressing and off load boots for foot ulcers.  surgery consult - signed off, no surgical intervention needed  Currently on ABx for UTI only, does not require ABx for chronic wounds  Medical Marijuana was added to control generalized pain, as oxycodone does not provide sufficient pain control. c/w IR oxycontin 15mg PRN PO.  Pain consult to NP, awaiting input.

## 2018-10-11 NOTE — PROGRESS NOTE ADULT - ATTENDING COMMENTS
Patient was seen and examined at bedside,   feels ok, feel sleepy again, says he does not sleep at night.      Physical exam:    Vital Signs Last 24 Hrs  T(C): 36.8 (11 Oct 2018 05:22), Max: 36.9 (10 Oct 2018 20:30)  T(F): 98.2 (11 Oct 2018 05:22), Max: 98.4 (10 Oct 2018 20:30)  HR: 73 (11 Oct 2018 06:42) (64 - 85)  BP: 118/66 (11 Oct 2018 06:42) (118/66 - 179/95)  BP(mean): --  RR: 18 (11 Oct 2018 06:42) (16 - 18)  SpO2: 100% (11 Oct 2018 06:42) (98% - 100%)    HEENT: Neck supple  heart: s1 S 2normal  Abdomen: NT< ND  chest: Clear  LE: No LE edema  skin: multiple decubiti stage 4.     A/P  1- UTI: VRE growing, patient states long standing UTIs,   states having some symptoms prior to admission.   Infection disease consult appreciated.   s/p Augmentin.     2- stage 4 decubiti: wound care input appreciated  Secondary to paraplegia   surgery input appreciated  continue wound care  Pain management for pain control     3- depression: continue wellbutrin.    dc planning.

## 2018-10-12 PROCEDURE — 99232 SBSQ HOSP IP/OBS MODERATE 35: CPT | Mod: GC

## 2018-10-12 RX ORDER — KETOROLAC TROMETHAMINE 30 MG/ML
10 SYRINGE (ML) INJECTION EVERY 6 HOURS
Qty: 0 | Refills: 0 | Status: DISCONTINUED | OUTPATIENT
Start: 2018-10-12 | End: 2018-10-15

## 2018-10-12 RX ADMIN — OXYCODONE HYDROCHLORIDE 15 MILLIGRAM(S): 5 TABLET ORAL at 00:34

## 2018-10-12 RX ADMIN — OXYCODONE HYDROCHLORIDE 15 MILLIGRAM(S): 5 TABLET ORAL at 17:08

## 2018-10-12 RX ADMIN — NYSTATIN CREAM 1 APPLICATION(S): 100000 CREAM TOPICAL at 07:58

## 2018-10-12 RX ADMIN — Medication 1 MILLIGRAM(S): at 12:33

## 2018-10-12 RX ADMIN — Medication 1 TABLET(S): at 12:33

## 2018-10-12 RX ADMIN — SENNA PLUS 2 TABLET(S): 8.6 TABLET ORAL at 21:46

## 2018-10-12 RX ADMIN — GABAPENTIN 200 MILLIGRAM(S): 400 CAPSULE ORAL at 17:09

## 2018-10-12 RX ADMIN — BUPROPION HYDROCHLORIDE 150 MILLIGRAM(S): 150 TABLET, EXTENDED RELEASE ORAL at 12:33

## 2018-10-12 RX ADMIN — OXYCODONE HYDROCHLORIDE 15 MILLIGRAM(S): 5 TABLET ORAL at 00:04

## 2018-10-12 RX ADMIN — Medication 5 MILLIGRAM(S): at 05:41

## 2018-10-12 RX ADMIN — Medication 5 MILLIGRAM(S): at 17:08

## 2018-10-12 RX ADMIN — Medication 10 MILLIGRAM(S): at 22:45

## 2018-10-12 RX ADMIN — Medication 500 MILLIGRAM(S): at 14:02

## 2018-10-12 RX ADMIN — OXYCODONE HYDROCHLORIDE 15 MILLIGRAM(S): 5 TABLET ORAL at 18:51

## 2018-10-12 RX ADMIN — Medication 500 MILLIGRAM(S): at 21:46

## 2018-10-12 RX ADMIN — Medication 500 MILLIGRAM(S): at 05:41

## 2018-10-12 RX ADMIN — Medication 10 MILLIGRAM(S): at 23:45

## 2018-10-12 RX ADMIN — ZINC SULFATE TAB 220 MG (50 MG ZINC EQUIVALENT) 220 MILLIGRAM(S): 220 (50 ZN) TAB at 12:33

## 2018-10-12 RX ADMIN — GABAPENTIN 200 MILLIGRAM(S): 400 CAPSULE ORAL at 05:41

## 2018-10-12 RX ADMIN — ENOXAPARIN SODIUM 40 MILLIGRAM(S): 100 INJECTION SUBCUTANEOUS at 05:41

## 2018-10-12 RX ADMIN — OXYCODONE HYDROCHLORIDE 15 MILLIGRAM(S): 5 TABLET ORAL at 07:55

## 2018-10-12 RX ADMIN — OXYCODONE HYDROCHLORIDE 15 MILLIGRAM(S): 5 TABLET ORAL at 08:55

## 2018-10-12 NOTE — PROGRESS NOTE ADULT - PROBLEM SELECTOR PLAN 1
UCx VRE - ID consult appreciated, recommends augmentin PO. continue PO Abx for now, by 10/15/18.  No need for isolation as per infection control department.  ID Dr. Orlando Continue PO Abx for now as recommended by ID, by 10/15/18.  No need for isolation as per infection control department.  ID Dr. Orlando

## 2018-10-12 NOTE — PROGRESS NOTE ADULT - PROBLEM SELECTOR PLAN 3
Likely multifactorial, pt's current situation + pain  Psych input appreciated (Dr. Paz); recommends Wellbutrin. Can start Lexapro once linezolid is finished(concern for serotonin release syndrome).  c/w Wellbutrin

## 2018-10-12 NOTE — PROGRESS NOTE ADULT - PROBLEM SELECTOR PLAN 2
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 Ischium (1cm x 3cm x 0.4cm) with pink tissue, drainage, and white wound edges  Stage 4 Pressure Injury to the Sacrum (5cm x 7cm x 1cm) with pink tissue, bone, drainage, white wound edges, and undermining (up to 3cm at 1-8 o'clock)  Stage 4 Pressure Injury to the R. Ischium (3cm x 1cm x 1cm) with granulation tissue, bone, drainage, white wound edges, and undermining (up to 5cm at 360 degrees  Stage 3 Pressure Injury to the R. Hip (9cm x 6cm) with granulation tissue, pink tissue, drainage, and white wound edges	  Bilateral Lower Extremities Ulcerations, f/u with Podiatry   daily dressing and off load boots for foot ulcers.  surgery consult - signed off, no surgical intervention needed  Currently on ABx for UTI only, does not require ABx for chronic wounds  Medical Marijuana was added to control generalized pain, as oxycodone does not provide sufficient pain control. c/w IR oxycontin 15mg PRN PO.  Pain consult to NP, c/w IR codon, gabapentin 200mg q 12hrs, baclofen, and PRN toradol IV. Consult appreciated.

## 2018-10-12 NOTE — PROGRESS NOTE ADULT - SUBJECTIVE AND OBJECTIVE BOX
Patient is a 28y old  Male who presents with a chief complaint of Pain of sacral and heel wounds (11 Oct 2018 17:20)      INTERVAL HPI/OVERNIGHT EVENTS: no specific event    T(C): 36.7 (10-12-18 @ 05:10), Max: 36.7 (10-12-18 @ 05:10)  HR: 68 (10-12-18 @ 05:10) (68 - 68)  BP: 120/45 (10-12-18 @ 05:10) (120/45 - 120/45)  RR: 18 (10-12-18 @ 05:10) (18 - 18)  SpO2: 100% (10-12-18 @ 05:10) (100% - 100%)  Wt(kg): --  I&O's Summary    11 Oct 2018 07:01  -  12 Oct 2018 07:00  --------------------------------------------------------  IN: 0 mL / OUT: 1400 mL / NET: -1400 mL        LABS: No labs available today.        MEDICATIONS  (STANDING):  amoxicillin      Capsule 500 milliGRAM(s) Oral every 8 hours  buPROPion XL . 150 milliGRAM(s) Oral daily  dronabinol 5 milliGRAM(s) Oral two times a day  enoxaparin Injectable 40 milliGRAM(s) SubCutaneous every 24 hours  folic acid 1 milliGRAM(s) Oral daily  gabapentin 200 milliGRAM(s) Oral every 12 hours  multivitamin 1 Tablet(s) Oral daily  nystatin Cream 1 Application(s) Topical <User Schedule>  senna 2 Tablet(s) Oral at bedtime  zinc sulfate 220 milliGRAM(s) Oral daily    MEDICATIONS  (PRN):  baclofen 20 milliGRAM(s) Oral two times a day PRN Muscle Spasm  ketorolac   Injectable 30 milliGRAM(s) IV Push every 6 hours PRN Moderate Pain (4 - 6)  ondansetron Injectable 4 milliGRAM(s) IV Push every 6 hours PRN Nausea  oxyCODONE    IR 15 milliGRAM(s) Oral every 8 hours PRN Severe Pain (7 - 10)      RADIOLOGY & ADDITIONAL TESTS:    Imaging Personally Reviewed:  [x] YES  [ ] NO    Consultant(s) Notes Reviewed:  [ x] YES  [ ] NO    REVIEW OF SYSTEMS:  CONSTITUTIONAL: No fever, weight loss, + fatigue, generalized pain  EYES: No eye pain, visual disturbances, or discharge  ENMT:  No difficulty hearing, tinnitus, vertigo; No sinus or throat pain  NECK: No pain or stiffness  RESPIRATORY: No cough, wheezing, chills or hemoptysis; No shortness of breath  CARDIOVASCULAR: No chest pain, palpitations, dizziness, or leg swelling  GASTROINTESTINAL: No abdominal or epigastric pain. No nausea, vomiting, or hematemesis; No diarrhea or constipation. No melena or hematochezia.  GENITOURINARY: No dysuria, frequency, hematuria, or incontinence  NEUROLOGICAL: No headaches, memory loss, loss of strength, numbness, or tremors      GENERAL: NAD  HEAD:  Atraumatic, Normocephalic  EYES: EOMI, PERRLA, conjunctiva and sclera clear  ENT: moist mucous membranes  NECK: Supple, No JVD, Normal thyroid  NERVOUS SYSTEM:  Alert & Oriented X3, Good concentration  CHEST/LUNG: No rales, rhonchi, wheezing, or rubs  HEART: Regular rate and rhythm; No murmurs, rubs, or gallops  ABDOMEN: Soft, Nontender, Nondistended; Bowel sounds present  EXTREMITIES: multiple deep decubiti stage 4.   SKIN: multiple decubiti stage 4.    Care Discussed with Consultants/Other Providers [ x] YES  [ ] NO Patient is a 28y old  Male who presents with a chief complaint of Pain of sacral and heel wounds (11 Oct 2018 17:20)      INTERVAL HPI/OVERNIGHT EVENTS: no specific event    T(C): 36.7 (10-12-18 @ 05:10), Max: 36.7 (10-12-18 @ 05:10)  HR: 68 (10-12-18 @ 05:10) (68 - 68)  BP: 120/45 (10-12-18 @ 05:10) (120/45 - 120/45)  RR: 18 (10-12-18 @ 05:10) (18 - 18)  SpO2: 100% (10-12-18 @ 05:10) (100% - 100%)  Wt(kg): --  I&O's Summary    11 Oct 2018 07:01  -  12 Oct 2018 07:00  --------------------------------------------------------  IN: 0 mL / OUT: 1400 mL / NET: -1400 mL        LABS: No labs available today. Pt keeps refusing labs        MEDICATIONS  (STANDING):  amoxicillin      Capsule 500 milliGRAM(s) Oral every 8 hours  buPROPion XL . 150 milliGRAM(s) Oral daily  dronabinol 5 milliGRAM(s) Oral two times a day  enoxaparin Injectable 40 milliGRAM(s) SubCutaneous every 24 hours  folic acid 1 milliGRAM(s) Oral daily  gabapentin 200 milliGRAM(s) Oral every 12 hours  multivitamin 1 Tablet(s) Oral daily  nystatin Cream 1 Application(s) Topical <User Schedule>  senna 2 Tablet(s) Oral at bedtime  zinc sulfate 220 milliGRAM(s) Oral daily    MEDICATIONS  (PRN):  baclofen 20 milliGRAM(s) Oral two times a day PRN Muscle Spasm  ketorolac   Injectable 30 milliGRAM(s) IV Push every 6 hours PRN Moderate Pain (4 - 6)  ondansetron Injectable 4 milliGRAM(s) IV Push every 6 hours PRN Nausea  oxyCODONE    IR 15 milliGRAM(s) Oral every 8 hours PRN Severe Pain (7 - 10)      RADIOLOGY & ADDITIONAL TESTS:    Imaging Personally Reviewed:  [x] YES  [ ] NO    Consultant(s) Notes Reviewed:  [ x] YES  [ ] NO    REVIEW OF SYSTEMS:  CONSTITUTIONAL: No fever, weight loss, + fatigue, generalized pain  EYES: No eye pain, visual disturbances, or discharge  ENMT:  No difficulty hearing, tinnitus, vertigo; No sinus or throat pain  NECK: No pain or stiffness  RESPIRATORY: No cough, wheezing, chills or hemoptysis; No shortness of breath  CARDIOVASCULAR: No chest pain, palpitations, dizziness, or leg swelling  GASTROINTESTINAL: No abdominal or epigastric pain. No nausea, vomiting, or hematemesis; No diarrhea or constipation. No melena or hematochezia.  GENITOURINARY: No dysuria, frequency, hematuria, or incontinence  NEUROLOGICAL: No headaches, memory loss, loss of strength, numbness, or tremors      GENERAL: NAD  HEAD:  Atraumatic, Normocephalic  EYES: EOMI, PERRLA, conjunctiva and sclera clear  ENT: moist mucous membranes  NECK: Supple, No JVD, Normal thyroid  NERVOUS SYSTEM:  Alert & Oriented X3, Good concentration  CHEST/LUNG: No rales, rhonchi, wheezing, or rubs  HEART: Regular rate and rhythm; No murmurs, rubs, or gallops  ABDOMEN: Soft, Nontender, Nondistended; Bowel sounds present  EXTREMITIES: multiple deep decubiti stage 4.   SKIN: multiple decubiti stage 4.    Care Discussed with Consultants/Other Providers [ x] YES  [ ] NO

## 2018-10-12 NOTE — PROGRESS NOTE ADULT - ATTENDING COMMENTS
Patient was seen and examined at bedside,   feels ok, feel sleepy again, says he does not sleep at night.   social care input appreciated, plan for dc to rehab next week.     Physical exam:    Vital Signs Last 24 Hrs  T(C): 36.7 (12 Oct 2018 05:10), Max: 36.7 (12 Oct 2018 05:10)  T(F): 98 (12 Oct 2018 05:10), Max: 98 (12 Oct 2018 05:10)  HR: 67 (12 Oct 2018 16:00) (67 - 68)  BP: 148/51 (12 Oct 2018 16:00) (120/45 - 148/51)  BP(mean): --  RR: 18 (12 Oct 2018 05:10) (18 - 18)  SpO2: 100% (12 Oct 2018 16:00) (100% - 100%)    HEENT: Neck supple  heart: s1 S 2normal  Abdomen: NT< ND  chest: Clear  LE: No LE edema  skin: multiple decubiti stage 4.     A/P  1- UTI: VRE growing, patient states long standing UTIs,   states having some symptoms prior to admission.   Infection disease consult appreciated.   s/p Augmentin.     2- stage 4 decubiti: wound care input appreciated  Secondary to paraplegia   surgery input appreciated  continue wound care  Pain management for pain control   advised patient to frequent turning q2 hours, says he will do.     3- depression: continue wellbutrin.    dc planning.
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
## 2018-10-13 PROCEDURE — 99232 SBSQ HOSP IP/OBS MODERATE 35: CPT | Mod: GC

## 2018-10-13 RX ORDER — LIDOCAINE 4 G/100G
1 CREAM TOPICAL ONCE
Qty: 0 | Refills: 0 | Status: COMPLETED | OUTPATIENT
Start: 2018-10-13 | End: 2018-10-13

## 2018-10-13 RX ORDER — LIDOCAINE 4 G/100G
1 CREAM TOPICAL DAILY
Qty: 0 | Refills: 0 | Status: DISCONTINUED | OUTPATIENT
Start: 2018-10-13 | End: 2018-10-24

## 2018-10-13 RX ADMIN — ENOXAPARIN SODIUM 40 MILLIGRAM(S): 100 INJECTION SUBCUTANEOUS at 06:31

## 2018-10-13 RX ADMIN — GABAPENTIN 200 MILLIGRAM(S): 400 CAPSULE ORAL at 06:31

## 2018-10-13 RX ADMIN — NYSTATIN CREAM 1 APPLICATION(S): 100000 CREAM TOPICAL at 07:54

## 2018-10-13 RX ADMIN — OXYCODONE HYDROCHLORIDE 15 MILLIGRAM(S): 5 TABLET ORAL at 21:25

## 2018-10-13 RX ADMIN — SENNA PLUS 2 TABLET(S): 8.6 TABLET ORAL at 21:06

## 2018-10-13 RX ADMIN — Medication 500 MILLIGRAM(S): at 21:06

## 2018-10-13 RX ADMIN — Medication 500 MILLIGRAM(S): at 06:31

## 2018-10-13 RX ADMIN — OXYCODONE HYDROCHLORIDE 15 MILLIGRAM(S): 5 TABLET ORAL at 02:15

## 2018-10-13 RX ADMIN — OXYCODONE HYDROCHLORIDE 15 MILLIGRAM(S): 5 TABLET ORAL at 14:20

## 2018-10-13 RX ADMIN — LIDOCAINE 1 PATCH: 4 CREAM TOPICAL at 06:31

## 2018-10-13 RX ADMIN — Medication 10 MILLIGRAM(S): at 18:59

## 2018-10-13 RX ADMIN — OXYCODONE HYDROCHLORIDE 15 MILLIGRAM(S): 5 TABLET ORAL at 22:30

## 2018-10-13 RX ADMIN — Medication 1 MILLIGRAM(S): at 13:10

## 2018-10-13 RX ADMIN — OXYCODONE HYDROCHLORIDE 15 MILLIGRAM(S): 5 TABLET ORAL at 13:20

## 2018-10-13 RX ADMIN — GABAPENTIN 200 MILLIGRAM(S): 400 CAPSULE ORAL at 17:17

## 2018-10-13 RX ADMIN — Medication 5 MILLIGRAM(S): at 18:16

## 2018-10-13 RX ADMIN — OXYCODONE HYDROCHLORIDE 15 MILLIGRAM(S): 5 TABLET ORAL at 01:11

## 2018-10-13 RX ADMIN — ZINC SULFATE TAB 220 MG (50 MG ZINC EQUIVALENT) 220 MILLIGRAM(S): 220 (50 ZN) TAB at 13:10

## 2018-10-13 RX ADMIN — Medication 500 MILLIGRAM(S): at 13:11

## 2018-10-13 RX ADMIN — BUPROPION HYDROCHLORIDE 150 MILLIGRAM(S): 150 TABLET, EXTENDED RELEASE ORAL at 13:10

## 2018-10-13 RX ADMIN — Medication 1 TABLET(S): at 13:10

## 2018-10-13 RX ADMIN — Medication 10 MILLIGRAM(S): at 17:59

## 2018-10-13 RX ADMIN — Medication 5 MILLIGRAM(S): at 06:31

## 2018-10-13 RX ADMIN — LIDOCAINE 1 PATCH: 4 CREAM TOPICAL at 17:18

## 2018-10-13 NOTE — PROGRESS NOTE ADULT - PROBLEM SELECTOR PLAN 2
change position q2 hours advised, patient educated, understands and will do.   podiatry and wound care consult appreciated  no further intervention as per surgery.  continue wound dressing as recommended.

## 2018-10-13 NOTE — PROGRESS NOTE ADULT - PROBLEM SELECTOR PLAN 1
VRE in urine, continue amoxcillin as per ID, last day 10/15  has wilson on, continue to help wound healing.   ID input appreciated

## 2018-10-13 NOTE — PROGRESS NOTE ADULT - SUBJECTIVE AND OBJECTIVE BOX
Patient is a 28y old  Male who presents with a chief complaint of Pain of sacral and heel wounds (13 Oct 2018 11:35)    INTERVAL HPI/OVERNIGHT EVENTS:  Patient was seen and examined at bedside, feels ok  Pending possible rehab placement next week.     Allergies    No Known Allergies    Intolerances    REVIEW OF SYSTEMS:  CONSTITUTIONAL: No fever, weight loss, or fatigue  EYES: No eye pain, visual disturbances, or discharge  RESPIRATORY: No cough, wheezing or shortness of breath  CARDIOVASCULAR: No chest pain, feeling of heart beats  GASTROINTESTINAL: No abdominal or epigastric pain. No nausea, vomiting; No diarrhea or constipation.  GENITOURINARY: No dysuria, frequency, hematuria  NEUROLOGICAL: No headaches  MUSCULOSKELETAL: No joint pain  PSYCHIATRIC: No depression or anxiety  HEME/LYMPH: No easy bruising, or bleeding gums  ALLERGY AND IMMUNOLOGIC: No hives or eczema    Medications:  amoxicillin      Capsule 500 milliGRAM(s) Oral every 8 hours  baclofen 20 milliGRAM(s) Oral two times a day PRN Muscle Spasm  buPROPion XL . 150 milliGRAM(s) Oral daily  dronabinol 5 milliGRAM(s) Oral two times a day  enoxaparin Injectable 40 milliGRAM(s) SubCutaneous every 24 hours  folic acid 1 milliGRAM(s) Oral daily  gabapentin 200 milliGRAM(s) Oral every 12 hours  ketorolac 10 milliGRAM(s) Oral every 6 hours PRN Severe Pain (7 - 10)  multivitamin 1 Tablet(s) Oral daily  nystatin Cream 1 Application(s) Topical <User Schedule>  ondansetron Injectable 4 milliGRAM(s) IV Push every 6 hours PRN Nausea  oxyCODONE    IR 15 milliGRAM(s) Oral every 8 hours PRN Severe Pain (7 - 10)  senna 2 Tablet(s) Oral at bedtime  zinc sulfate 220 milliGRAM(s) Oral daily      PHYSICAL EXAM:  Vital Signs Last 24 Hrs  T(C): 36.2 (13 Oct 2018 15:03), Max: 37.3 (12 Oct 2018 20:26)  T(F): 97.2 (13 Oct 2018 15:03), Max: 99.1 (12 Oct 2018 20:26)  HR: 78 (13 Oct 2018 15:03) (67 - 81)  BP: 94/52 (13 Oct 2018 15:03) (94/52 - 148/51)  BP(mean): --  RR: 17 (13 Oct 2018 15:03) (16 - 18)  SpO2: 100% (13 Oct 2018 15:03) (100% - 100%)    GENERAL: NAD  HEAD:  Atraumatic, Normocephalic  EYES: EOMI, PERRLA, conjunctiva and sclera clear  ENT: moist mucous membranes  NECK: Supple, No JVD, Normal thyroid  NERVOUS SYSTEM:  Alert & Oriented X3, Good concentration;   CHEST/LUNG: No rales, rhonchi, wheezing, or rubs  HEART: Regular rate and rhythm; No murmurs, rubs, or gallops  ABDOMEN: Soft, Nontender, Nondistended; Bowel sounds present  EXTREMITIES:  bilateral LE mildly contracted,   multiple stage 4 decubiti  LABS:    Culture & Sensitivity:   CAPILLARY BLOOD GLUCOSE    10-12 @ 07:01  -  10-13 @ 07:00  --------------------------------------------------------  IN: 0 mL / OUT: 1350 mL / NET: -1350 mL    RADIOLOGY & ADDITIONAL TESTS:  Radiology testing independently reviewed:     Consultant(s) Notes Reviewed:  [ x] YES  [ ] NO    Care Discussed with Consultants/Other Providers [ x] YES  [ ] NO
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
## 2018-10-14 PROCEDURE — 99232 SBSQ HOSP IP/OBS MODERATE 35: CPT | Mod: GC

## 2018-10-14 RX ADMIN — BUPROPION HYDROCHLORIDE 150 MILLIGRAM(S): 150 TABLET, EXTENDED RELEASE ORAL at 11:20

## 2018-10-14 RX ADMIN — Medication 5 MILLIGRAM(S): at 05:38

## 2018-10-14 RX ADMIN — Medication 10 MILLIGRAM(S): at 02:41

## 2018-10-14 RX ADMIN — OXYCODONE HYDROCHLORIDE 15 MILLIGRAM(S): 5 TABLET ORAL at 14:36

## 2018-10-14 RX ADMIN — Medication 10 MILLIGRAM(S): at 03:45

## 2018-10-14 RX ADMIN — GABAPENTIN 200 MILLIGRAM(S): 400 CAPSULE ORAL at 05:38

## 2018-10-14 RX ADMIN — LIDOCAINE 1 PATCH: 4 CREAM TOPICAL at 16:47

## 2018-10-14 RX ADMIN — Medication 500 MILLIGRAM(S): at 22:57

## 2018-10-14 RX ADMIN — OXYCODONE HYDROCHLORIDE 15 MILLIGRAM(S): 5 TABLET ORAL at 23:27

## 2018-10-14 RX ADMIN — Medication 10 MILLIGRAM(S): at 12:15

## 2018-10-14 RX ADMIN — Medication 1 TABLET(S): at 11:20

## 2018-10-14 RX ADMIN — OXYCODONE HYDROCHLORIDE 15 MILLIGRAM(S): 5 TABLET ORAL at 07:13

## 2018-10-14 RX ADMIN — OXYCODONE HYDROCHLORIDE 15 MILLIGRAM(S): 5 TABLET ORAL at 15:36

## 2018-10-14 RX ADMIN — ENOXAPARIN SODIUM 40 MILLIGRAM(S): 100 INJECTION SUBCUTANEOUS at 06:16

## 2018-10-14 RX ADMIN — Medication 10 MILLIGRAM(S): at 18:04

## 2018-10-14 RX ADMIN — OXYCODONE HYDROCHLORIDE 15 MILLIGRAM(S): 5 TABLET ORAL at 06:16

## 2018-10-14 RX ADMIN — GABAPENTIN 200 MILLIGRAM(S): 400 CAPSULE ORAL at 17:04

## 2018-10-14 RX ADMIN — ZINC SULFATE TAB 220 MG (50 MG ZINC EQUIVALENT) 220 MILLIGRAM(S): 220 (50 ZN) TAB at 11:20

## 2018-10-14 RX ADMIN — Medication 5 MILLIGRAM(S): at 17:04

## 2018-10-14 RX ADMIN — SENNA PLUS 2 TABLET(S): 8.6 TABLET ORAL at 22:57

## 2018-10-14 RX ADMIN — Medication 1 MILLIGRAM(S): at 11:20

## 2018-10-14 RX ADMIN — Medication 500 MILLIGRAM(S): at 05:38

## 2018-10-14 RX ADMIN — LIDOCAINE 1 PATCH: 4 CREAM TOPICAL at 05:37

## 2018-10-14 RX ADMIN — Medication 500 MILLIGRAM(S): at 13:12

## 2018-10-14 RX ADMIN — Medication 10 MILLIGRAM(S): at 17:04

## 2018-10-14 RX ADMIN — Medication 10 MILLIGRAM(S): at 11:51

## 2018-10-14 RX ADMIN — OXYCODONE HYDROCHLORIDE 15 MILLIGRAM(S): 5 TABLET ORAL at 22:57

## 2018-10-14 NOTE — PROGRESS NOTE ADULT - SUBJECTIVE AND OBJECTIVE BOX
Patient is a 28y old  Male who presents with a chief complaint of Pain of sacral and heel wounds (13 Oct 2018 15:14)      Overnight Events: c/o pain occasionally    REVIEW OF SYSTEMS:    CONSTITUTIONAL: No weakness, fevers or chills  RESPIRATORY: No cough, wheezing, hemoptysis; No shortness of breath  CARDIOVASCULAR: No chest pain or palpitations  GASTROINTESTINAL: No abdominal or epigastric pain. No nausea, vomiting, or hematemesis; No diarrhea or constipation. No melena or hematochezia.  NEUROLOGICAL: No numbness or weakness  All other review of systems is negative unless indicated above.    MEDICATIONS  (STANDING):  amoxicillin      Capsule 500 milliGRAM(s) Oral every 8 hours  buPROPion XL . 150 milliGRAM(s) Oral daily  dronabinol 5 milliGRAM(s) Oral two times a day  enoxaparin Injectable 40 milliGRAM(s) SubCutaneous every 24 hours  folic acid 1 milliGRAM(s) Oral daily  gabapentin 200 milliGRAM(s) Oral every 12 hours  multivitamin 1 Tablet(s) Oral daily  nystatin Cream 1 Application(s) Topical <User Schedule>  senna 2 Tablet(s) Oral at bedtime  zinc sulfate 220 milliGRAM(s) Oral daily    MEDICATIONS  (PRN):  baclofen 20 milliGRAM(s) Oral two times a day PRN Muscle Spasm  ketorolac 10 milliGRAM(s) Oral every 6 hours PRN Severe Pain (7 - 10)  lidocaine   Patch 1 Patch Transdermal daily PRN pain  ondansetron Injectable 4 milliGRAM(s) IV Push every 6 hours PRN Nausea  oxyCODONE    IR 15 milliGRAM(s) Oral every 8 hours PRN Severe Pain (7 - 10)    Vital Signs Last 24 Hrs  T(C): 37.1 (13 Oct 2018 20:24), Max: 37.1 (13 Oct 2018 20:24)  T(F): 98.7 (13 Oct 2018 20:24), Max: 98.7 (13 Oct 2018 20:24)  HR: 76 (13 Oct 2018 20:24) (69 - 88)  BP: 135/58 (13 Oct 2018 20:24) (94/52 - 143/72)  BP(mean): --  RR: 17 (13 Oct 2018 20:24) (17 - 17)  SpO2: 100% (13 Oct 2018 20:24) (100% - 100%)    General: WN/WD NAD  HEAD:  Atraumatic, Normocephalic  EYES: EOMI, PERRLA, conjunctiva and sclera clear  ENT: moist mucous membranes  NECK: Supple, No JVD, Normal thyroid  NERVOUS SYSTEM:  Alert & Oriented X3, Good concentration;   CHEST/LUNG: No rales, rhonchi, wheezing, or rubs  HEART: Regular rate and rhythm; No murmurs, rubs, or gallops  ABDOMEN: Soft, Nontender, Nondistended; Bowel sounds present  EXTREMITIES:  bilateral LE mildly contracted,   multiple stage 4 decubiti        Labs:          10-03 Chol 126 LDL 67 HDL 50 Trig 45

## 2018-10-15 PROCEDURE — 99232 SBSQ HOSP IP/OBS MODERATE 35: CPT | Mod: GC

## 2018-10-15 RX ORDER — KETOROLAC TROMETHAMINE 30 MG/ML
10 SYRINGE (ML) INJECTION EVERY 6 HOURS
Qty: 0 | Refills: 0 | Status: DISCONTINUED | OUTPATIENT
Start: 2018-10-15 | End: 2018-10-20

## 2018-10-15 RX ORDER — LIDOCAINE 4 G/100G
0 CREAM TOPICAL
Qty: 0 | Refills: 0 | COMMUNITY
Start: 2018-10-15

## 2018-10-15 RX ADMIN — SENNA PLUS 2 TABLET(S): 8.6 TABLET ORAL at 21:46

## 2018-10-15 RX ADMIN — Medication 10 MILLIGRAM(S): at 19:08

## 2018-10-15 RX ADMIN — GABAPENTIN 200 MILLIGRAM(S): 400 CAPSULE ORAL at 05:42

## 2018-10-15 RX ADMIN — LIDOCAINE 1 PATCH: 4 CREAM TOPICAL at 08:16

## 2018-10-15 RX ADMIN — NYSTATIN CREAM 1 APPLICATION(S): 100000 CREAM TOPICAL at 08:17

## 2018-10-15 RX ADMIN — OXYCODONE HYDROCHLORIDE 15 MILLIGRAM(S): 5 TABLET ORAL at 23:46

## 2018-10-15 RX ADMIN — OXYCODONE HYDROCHLORIDE 15 MILLIGRAM(S): 5 TABLET ORAL at 07:36

## 2018-10-15 RX ADMIN — GABAPENTIN 200 MILLIGRAM(S): 400 CAPSULE ORAL at 18:54

## 2018-10-15 RX ADMIN — ZINC SULFATE TAB 220 MG (50 MG ZINC EQUIVALENT) 220 MILLIGRAM(S): 220 (50 ZN) TAB at 11:55

## 2018-10-15 RX ADMIN — Medication 10 MILLIGRAM(S): at 05:42

## 2018-10-15 RX ADMIN — OXYCODONE HYDROCHLORIDE 15 MILLIGRAM(S): 5 TABLET ORAL at 07:06

## 2018-10-15 RX ADMIN — Medication 1 TABLET(S): at 11:55

## 2018-10-15 RX ADMIN — Medication 500 MILLIGRAM(S): at 05:44

## 2018-10-15 RX ADMIN — Medication 5 MILLIGRAM(S): at 07:08

## 2018-10-15 RX ADMIN — OXYCODONE HYDROCHLORIDE 15 MILLIGRAM(S): 5 TABLET ORAL at 23:16

## 2018-10-15 RX ADMIN — Medication 10 MILLIGRAM(S): at 06:12

## 2018-10-15 RX ADMIN — OXYCODONE HYDROCHLORIDE 15 MILLIGRAM(S): 5 TABLET ORAL at 15:04

## 2018-10-15 RX ADMIN — LIDOCAINE 1 PATCH: 4 CREAM TOPICAL at 20:19

## 2018-10-15 RX ADMIN — BUPROPION HYDROCHLORIDE 150 MILLIGRAM(S): 150 TABLET, EXTENDED RELEASE ORAL at 11:55

## 2018-10-15 RX ADMIN — Medication 5 MILLIGRAM(S): at 18:54

## 2018-10-15 RX ADMIN — ENOXAPARIN SODIUM 40 MILLIGRAM(S): 100 INJECTION SUBCUTANEOUS at 08:16

## 2018-10-15 RX ADMIN — OXYCODONE HYDROCHLORIDE 15 MILLIGRAM(S): 5 TABLET ORAL at 16:43

## 2018-10-15 RX ADMIN — Medication 1 MILLIGRAM(S): at 11:55

## 2018-10-15 NOTE — PHARMACOTHERAPY INTERVENTION NOTE - COMMENTS
Lidocaine patch is ordered as prn pain, while the patient has other pain medications for prn moderate and for prn severe pain. Recommended to change Lidocaine prn indication for specific type of pain.

## 2018-10-15 NOTE — CHART NOTE - NSCHARTNOTEFT_GEN_A_CORE
Reassessment:   28yMalePatient is a 28y old  Male who presents with a chief complaint of Pain of sacral and heel wounds (15 Oct 2018 11:54)      Factors impacting intake: [ ] none [ ] nausea  [ ] vomiting [ ] diarrhea [ ] constipation  [ ]chewing problems [ ] swallowing issues  [X ] other: spastic paraplegia and multiple decubitus wounds    Diet Presciption: Diet, Regular:   Supplement Feeding Modality:  Oral  Ensure Enlive Cans or Servings Per Day:  1       Frequency:  Three Times a day (10-03-18 @ 10:17)    Intake: Visited pt. alert, reports appetite improving, intake >50% & tolerating, drinks Ensure Enlive 1 can daily at mealtime, stated no other nutrition concerns or questions presently, colostomy care noted, wound care ongoing, awaiting placement & followed by , d/w RN, c/w regular diet with Ensure Enlive 1 can TID & MVI/minerals/vit.C/ZnSO for wound healing as medically feasible.    Daily Weight in k (11 Oct 2018 05:22)  Weight in k.3 (10 Oct 2018 05:32)    % Weight Change: stable     Pertinent Medications: MEDICATIONS  (STANDING):  buPROPion XL . 150 milliGRAM(s) Oral daily  dronabinol 5 milliGRAM(s) Oral two times a day  enoxaparin Injectable 40 milliGRAM(s) SubCutaneous every 24 hours  folic acid 1 milliGRAM(s) Oral daily  gabapentin 200 milliGRAM(s) Oral every 12 hours  multivitamin 1 Tablet(s) Oral daily  nystatin Cream 1 Application(s) Topical <User Schedule>  senna 2 Tablet(s) Oral at bedtime  zinc sulfate 220 milliGRAM(s) Oral daily    MEDICATIONS  (PRN):  baclofen 20 milliGRAM(s) Oral two times a day PRN Muscle Spasm  ketorolac 10 milliGRAM(s) Oral every 6 hours PRN Moderate Pain (4 - 6)  lidocaine   Patch 1 Patch Transdermal daily PRN pain  ondansetron Injectable 4 milliGRAM(s) IV Push every 6 hours PRN Nausea  oxyCODONE    IR 15 milliGRAM(s) Oral every 8 hours PRN Severe Pain (7 - 10)    Pertinent Labs:  10-09 Phos 4.0 mg/dL 10-03 [id number] 5.1 % 10-03 Chol 126 mg/dL LDL 67 mg/dL HDL 50 mg/dL Trig 45 mg/dL     CAPILLARY BLOOD GLUCOSE        Skin: multiple pressure ulcers    Estimated Needs:   [X ] no change since previous assessment  [ ] recalculated:     Previous Nutrition Diagnosis:   [ ] Inadequate Energy Intake [ ]Inadequate Oral Intake [ ] Excessive Energy Intake   [ ] Underweight [ ] Increased Nutrient Needs [ ] Overweight/Obesity   [ ] Altered GI Function [ ] Unintended Weight Loss [ ] Food & Nutrition Related Knowledge Deficit [ Severe] Malnutrition     Nutrition Diagnosis is [ x] ongoing  [ ] resolved [ ] not applicable     New Nutrition Diagnosis: [ ] not applicable       Interventions: To meet nutrition needs   Recommend  [ ] Change Diet To:  [ ] Nutrition Supplement  [ ] Nutrition Support  [ ] Other:     Monitoring and Evaluation:   [X ] PO intake [ x ] Tolerance to diet prescription [ x ] weights [ x ] labs[ x ] follow up per protocol  [ ] other:

## 2018-10-15 NOTE — PHARMACOTHERAPY INTERVENTION NOTE - COMMENTS
Oxycodone and Ketorolac were ordered as prn severe pain.  Recommended to change prn indication for one of the pain medications.

## 2018-10-15 NOTE — PROGRESS NOTE ADULT - PROBLEM SELECTOR PLAN 1
-change position q2 hours advised, patient educated  -podiatry and wound care consult appreciated, no further intervention as per surgery.  -continue wound dressing as recommended.

## 2018-10-15 NOTE — PROGRESS NOTE ADULT - SUBJECTIVE AND OBJECTIVE BOX
Patient is a 28y old  Male who presents with a chief complaint of Pain of sacral and heel wounds (14 Oct 2018 12:09)      INTERVAL HPI/OVERNIGHT EVENTS: No specific symptoms over the weekend.    T(C): 36.7 (10-15-18 @ 05:27), Max: 36.7 (10-15-18 @ 05:27)  HR: 74 (10-15-18 @ 05:27) (74 - 74)  BP: 110/45 (10-15-18 @ 05:27) (110/45 - 110/45)  RR: 16 (10-15-18 @ 05:27) (16 - 16)  SpO2: 100% (10-15-18 @ 05:27) (100% - 100%)  Wt(kg): --  I&O's Summary    14 Oct 2018 07:01  -  15 Oct 2018 07:00  --------------------------------------------------------  IN: 0 mL / OUT: 501 mL / NET: -501 mL        LABS: Pt refuses              CAPILLARY BLOOD GLUCOSE                  MEDICATIONS  (STANDING):  buPROPion XL . 150 milliGRAM(s) Oral daily  dronabinol 5 milliGRAM(s) Oral two times a day  enoxaparin Injectable 40 milliGRAM(s) SubCutaneous every 24 hours  folic acid 1 milliGRAM(s) Oral daily  gabapentin 200 milliGRAM(s) Oral every 12 hours  multivitamin 1 Tablet(s) Oral daily  nystatin Cream 1 Application(s) Topical <User Schedule>  senna 2 Tablet(s) Oral at bedtime  zinc sulfate 220 milliGRAM(s) Oral daily    MEDICATIONS  (PRN):  baclofen 20 milliGRAM(s) Oral two times a day PRN Muscle Spasm  ketorolac 10 milliGRAM(s) Oral every 6 hours PRN Moderate Pain (4 - 6)  lidocaine   Patch 1 Patch Transdermal daily PRN pain  ondansetron Injectable 4 milliGRAM(s) IV Push every 6 hours PRN Nausea  oxyCODONE    IR 15 milliGRAM(s) Oral every 8 hours PRN Severe Pain (7 - 10)      REVIEW OF SYSTEMS:  CONSTITUTIONAL: No fever, weight loss  EYES: No eye pain, visual disturbances, or discharge  ENMT:  No difficulty hearing, tinnitus, vertigo; No sinus or throat pain  NECK: No pain or stiffness  RESPIRATORY: No cough, wheezing, chills or hemoptysis; No shortness of breath  CARDIOVASCULAR: No chest pain, palpitations, dizziness, or leg swelling  GASTROINTESTINAL: No abdominal or epigastric pain. No nausea, vomiting, or hematemesis; No diarrhea or constipation. No melena or hematochezia.  GENITOURINARY: No dysuria, frequency, hematuria, or incontinence  NEUROLOGICAL: No headaches, memory loss, loss of strength, numbness, or tremors      GENERAL: NAD  HEAD:  Atraumatic, Normocephalic  EYES: EOMI, PERRLA, conjunctiva and sclera clear  ENT: moist mucous membranes  NECK: Supple, No JVD, Normal thyroid  NERVOUS SYSTEM:  Alert & Oriented X3, Good concentration  CHEST/LUNG: No rales, rhonchi, wheezing, or rubs  HEART: Regular rate and rhythm; No murmurs, rubs, or gallops  ABDOMEN: Soft, Nontender, Nondistended; Bowel sounds present  EXTREMITIES: multiple deep decubiti stage 4.   SKIN: multiple decubiti stage 4.    Care Discussed with Consultants/Other Providers [x ] YES  [ ] NO

## 2018-10-16 DIAGNOSIS — N39.0 URINARY TRACT INFECTION, SITE NOT SPECIFIED: ICD-10-CM

## 2018-10-16 PROCEDURE — 99232 SBSQ HOSP IP/OBS MODERATE 35: CPT | Mod: GC

## 2018-10-16 PROCEDURE — 99233 SBSQ HOSP IP/OBS HIGH 50: CPT

## 2018-10-16 RX ADMIN — SENNA PLUS 2 TABLET(S): 8.6 TABLET ORAL at 21:29

## 2018-10-16 RX ADMIN — GABAPENTIN 200 MILLIGRAM(S): 400 CAPSULE ORAL at 07:01

## 2018-10-16 RX ADMIN — LIDOCAINE 1 PATCH: 4 CREAM TOPICAL at 19:54

## 2018-10-16 RX ADMIN — NYSTATIN CREAM 1 APPLICATION(S): 100000 CREAM TOPICAL at 10:10

## 2018-10-16 RX ADMIN — Medication 10 MILLIGRAM(S): at 14:30

## 2018-10-16 RX ADMIN — OXYCODONE HYDROCHLORIDE 15 MILLIGRAM(S): 5 TABLET ORAL at 08:03

## 2018-10-16 RX ADMIN — Medication 1 TABLET(S): at 13:55

## 2018-10-16 RX ADMIN — Medication 10 MILLIGRAM(S): at 02:05

## 2018-10-16 RX ADMIN — BUPROPION HYDROCHLORIDE 150 MILLIGRAM(S): 150 TABLET, EXTENDED RELEASE ORAL at 13:55

## 2018-10-16 RX ADMIN — GABAPENTIN 200 MILLIGRAM(S): 400 CAPSULE ORAL at 17:21

## 2018-10-16 RX ADMIN — Medication 10 MILLIGRAM(S): at 21:59

## 2018-10-16 RX ADMIN — Medication 10 MILLIGRAM(S): at 13:55

## 2018-10-16 RX ADMIN — OXYCODONE HYDROCHLORIDE 15 MILLIGRAM(S): 5 TABLET ORAL at 17:23

## 2018-10-16 RX ADMIN — ZINC SULFATE TAB 220 MG (50 MG ZINC EQUIVALENT) 220 MILLIGRAM(S): 220 (50 ZN) TAB at 13:55

## 2018-10-16 RX ADMIN — Medication 10 MILLIGRAM(S): at 21:29

## 2018-10-16 RX ADMIN — OXYCODONE HYDROCHLORIDE 15 MILLIGRAM(S): 5 TABLET ORAL at 17:53

## 2018-10-16 RX ADMIN — OXYCODONE HYDROCHLORIDE 15 MILLIGRAM(S): 5 TABLET ORAL at 08:45

## 2018-10-16 RX ADMIN — Medication 10 MILLIGRAM(S): at 02:35

## 2018-10-16 RX ADMIN — Medication 1 MILLIGRAM(S): at 13:55
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
## 2018-10-16 NOTE — PROGRESS NOTE ADULT - PROBLEM SELECTOR PLAN 7
IMPROVE VTE Individual Risk Assessment          RISK                                                          Points  [  ] Previous VTE                                                3  [  ] Thrombophilia                                             2  [ x ] Lower limb paralysis                                   2        (unable to hold up >15 seconds)    [  ] Current Cancer                                             2         (within 6 months)  [x  ] Immobilization > 24 hrs                              1  [  ] ICU/CCU stay > 24 hours                             1  [  ] Age > 60                                                         1    IMPROVE VTE Score: 3, dvt ppx    DC plan to [place].

## 2018-10-16 NOTE — PROGRESS NOTE ADULT - SUBJECTIVE AND OBJECTIVE BOX
Patient is a 28y old  Male who presents with a chief complaint of Pain of sacral and heel wounds (15 Oct 2018 11:54)      INTERVAL HPI/OVERNIGHT EVENTS: No specific complaint, awaiting placement from [place].  T(C): --  HR: --  BP: --  RR: --  SpO2: --  Wt(kg): --  I&O's Summary    15 Oct 2018 07:01  -  16 Oct 2018 07:00  --------------------------------------------------------  IN: 0 mL / OUT: 750 mL / NET: -750 mL        LABS:  No labs for today            CAPILLARY BLOOD GLUCOSE                  MEDICATIONS  (STANDING):  buPROPion XL . 150 milliGRAM(s) Oral daily  enoxaparin Injectable 40 milliGRAM(s) SubCutaneous every 24 hours  folic acid 1 milliGRAM(s) Oral daily  gabapentin 200 milliGRAM(s) Oral every 12 hours  multivitamin 1 Tablet(s) Oral daily  nystatin Cream 1 Application(s) Topical <User Schedule>  senna 2 Tablet(s) Oral at bedtime  zinc sulfate 220 milliGRAM(s) Oral daily    MEDICATIONS  (PRN):  baclofen 20 milliGRAM(s) Oral two times a day PRN Muscle Spasm  ketorolac 10 milliGRAM(s) Oral every 6 hours PRN Moderate Pain (4 - 6)  lidocaine   Patch 1 Patch Transdermal daily PRN pain  ondansetron Injectable 4 milliGRAM(s) IV Push every 6 hours PRN Nausea  oxyCODONE    IR 15 milliGRAM(s) Oral every 8 hours PRN Severe Pain (7 - 10)      REVIEW OF SYSTEMS:  CONSTITUTIONAL: No fever, weight loss  EYES: No eye pain, visual disturbances, or discharge  ENMT:  No difficulty hearing, tinnitus, vertigo; No sinus or throat pain  NECK: No pain or stiffness  RESPIRATORY: No cough, wheezing, chills or hemoptysis; No shortness of breath  CARDIOVASCULAR: No chest pain, palpitations, dizziness, or leg swelling  GASTROINTESTINAL: No abdominal or epigastric pain. No nausea, vomiting, or hematemesis; No diarrhea or constipation. No melena or hematochezia.  GENITOURINARY: No dysuria, frequency, hematuria, or incontinence  NEUROLOGICAL: No headaches, memory loss, loss of strength, numbness, or tremors      GENERAL: NAD  HEAD:  Atraumatic, Normocephalic  EYES: EOMI, PERRLA, conjunctiva and sclera clear  ENT: moist mucous membranes  NECK: Supple, No JVD, Normal thyroid  NERVOUS SYSTEM:  Alert & Oriented X3, Good concentration  CHEST/LUNG: No rales, rhonchi, wheezing, or rubs  HEART: Regular rate and rhythm; No murmurs, rubs, or gallops  ABDOMEN: Soft, Nontender, Nondistended; Bowel sounds present  EXTREMITIES: multiple deep decubiti stage 4.   SKIN: multiple decubiti stage 4.    Care Discussed with Consultants/Other Providers [x ] YES  [ ] NO
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
## 2018-10-16 NOTE — PROGRESS NOTE ADULT - PROBLEM SELECTOR PLAN 1
paraplegia, chronic wounds/decubiti ulcers   - c/w oxycodone 15mg q 8 hrs   - baclofen 20 mg prn for spasms  - c/w gabapentin   - lidocaine patch   - turn and position  - wound care   - pending discharge to NH facility paraplegia, chronic wounds/decubiti ulcers   - c/w oxycodone 15mg q 8 hrs   - baclofen 20 mg prn for spasms  - c/w  toradol 10 mg prn   - c/w gabapentin   - lidocaine patch   - turn and position  - wound care   - pending discharge to NH facility

## 2018-10-17 PROCEDURE — 99232 SBSQ HOSP IP/OBS MODERATE 35: CPT | Mod: GC

## 2018-10-17 RX ORDER — OXYCODONE HYDROCHLORIDE 5 MG/1
15 TABLET ORAL EVERY 8 HOURS
Qty: 0 | Refills: 0 | Status: DISCONTINUED | OUTPATIENT
Start: 2018-10-17 | End: 2018-10-20

## 2018-10-17 RX ADMIN — Medication 10 MILLIGRAM(S): at 05:33

## 2018-10-17 RX ADMIN — OXYCODONE HYDROCHLORIDE 15 MILLIGRAM(S): 5 TABLET ORAL at 21:15

## 2018-10-17 RX ADMIN — ZINC SULFATE TAB 220 MG (50 MG ZINC EQUIVALENT) 220 MILLIGRAM(S): 220 (50 ZN) TAB at 12:31

## 2018-10-17 RX ADMIN — Medication 1 MILLIGRAM(S): at 12:31

## 2018-10-17 RX ADMIN — OXYCODONE HYDROCHLORIDE 15 MILLIGRAM(S): 5 TABLET ORAL at 12:32

## 2018-10-17 RX ADMIN — OXYCODONE HYDROCHLORIDE 15 MILLIGRAM(S): 5 TABLET ORAL at 03:09

## 2018-10-17 RX ADMIN — OXYCODONE HYDROCHLORIDE 15 MILLIGRAM(S): 5 TABLET ORAL at 02:39

## 2018-10-17 RX ADMIN — Medication 10 MILLIGRAM(S): at 17:55

## 2018-10-17 RX ADMIN — LIDOCAINE 1 PATCH: 4 CREAM TOPICAL at 12:38

## 2018-10-17 RX ADMIN — Medication 1 TABLET(S): at 12:31

## 2018-10-17 RX ADMIN — Medication 10 MILLIGRAM(S): at 18:30

## 2018-10-17 RX ADMIN — NYSTATIN CREAM 1 APPLICATION(S): 100000 CREAM TOPICAL at 09:00

## 2018-10-17 RX ADMIN — OXYCODONE HYDROCHLORIDE 15 MILLIGRAM(S): 5 TABLET ORAL at 13:30

## 2018-10-17 RX ADMIN — LIDOCAINE 1 PATCH: 4 CREAM TOPICAL at 17:56

## 2018-10-17 RX ADMIN — OXYCODONE HYDROCHLORIDE 15 MILLIGRAM(S): 5 TABLET ORAL at 20:45

## 2018-10-17 RX ADMIN — LIDOCAINE 1 PATCH: 4 CREAM TOPICAL at 08:00

## 2018-10-17 RX ADMIN — Medication 10 MILLIGRAM(S): at 06:03

## 2018-10-17 RX ADMIN — GABAPENTIN 200 MILLIGRAM(S): 400 CAPSULE ORAL at 05:33

## 2018-10-17 RX ADMIN — BUPROPION HYDROCHLORIDE 150 MILLIGRAM(S): 150 TABLET, EXTENDED RELEASE ORAL at 12:31

## 2018-10-17 RX ADMIN — Medication 20 MILLIGRAM(S): at 12:31

## 2018-10-17 RX ADMIN — LIDOCAINE 1 PATCH: 4 CREAM TOPICAL at 19:30

## 2018-10-17 RX ADMIN — GABAPENTIN 200 MILLIGRAM(S): 400 CAPSULE ORAL at 17:56

## 2018-10-17 NOTE — PROGRESS NOTE ADULT - PROBLEM SELECTOR PLAN 1
-Condition unchanged.  -change position q2 hours advised, patient educated  -Podiatry and wound care consult appreciated, no further intervention as per surgery.  -continue wound dressing as recommended.

## 2018-10-17 NOTE — PROGRESS NOTE ADULT - SUBJECTIVE AND OBJECTIVE BOX
Patient is a 28y old  Male who presents with a chief complaint of Pain of sacral and heel wounds (16 Oct 2018 13:53)      INTERVAL HPI/OVERNIGHT EVENTS: No specific complaints, pt is sleeping in the bed.      T(C): 36.4 (10-17-18 @ 05:32), Max: 36.4 (10-17-18 @ 05:32)  HR: 77 (10-17-18 @ 05:32) (77 - 77)  BP: 99/59 (10-17-18 @ 05:32) (99/59 - 99/59)  RR: 18 (10-17-18 @ 05:32) (18 - 18)  SpO2: 100% (10-17-18 @ 05:32) (100% - 100%)  Wt(kg): --  I&O's Summary        CAPILLARY BLOOD GLUCOSE          MEDICATIONS  (STANDING):  buPROPion XL . 150 milliGRAM(s) Oral daily  enoxaparin Injectable 40 milliGRAM(s) SubCutaneous every 24 hours  folic acid 1 milliGRAM(s) Oral daily  gabapentin 200 milliGRAM(s) Oral every 12 hours  multivitamin 1 Tablet(s) Oral daily  nystatin Cream 1 Application(s) Topical <User Schedule>  senna 2 Tablet(s) Oral at bedtime  zinc sulfate 220 milliGRAM(s) Oral daily    MEDICATIONS  (PRN):  baclofen 20 milliGRAM(s) Oral two times a day PRN Muscle Spasm  ketorolac 10 milliGRAM(s) Oral every 6 hours PRN Moderate Pain (4 - 6)  lidocaine   Patch 1 Patch Transdermal daily PRN pain  ondansetron Injectable 4 milliGRAM(s) IV Push every 6 hours PRN Nausea  oxyCODONE    IR 15 milliGRAM(s) Oral every 8 hours PRN Severe Pain (7 - 10)      RADIOLOGY & ADDITIONAL TESTS:    Imaging Personally Reviewed:  [x ] YES  [ ] NO    Consultant(s) Notes Reviewed:  [x ] YES  [ ] NO    REVIEW OF SYSTEMS:  CONSTITUTIONAL: No fever, weight loss  EYES: No eye pain, visual disturbances, or discharge  ENMT:  No difficulty hearing, tinnitus, vertigo; No sinus or throat pain  NECK: No pain or stiffness  RESPIRATORY: No cough, wheezing, chills or hemoptysis; No shortness of breath  CARDIOVASCULAR: No chest pain, palpitations, dizziness, or leg swelling  GASTROINTESTINAL: No abdominal or epigastric pain. No nausea, vomiting, or hematemesis; No diarrhea or constipation. No melena or hematochezia.  GENITOURINARY: No dysuria, frequency, hematuria, or incontinence  NEUROLOGICAL: No headaches, memory loss, loss of strength, numbness, or tremors      GENERAL: NAD  HEAD:  Atraumatic, Normocephalic  EYES: EOMI, PERRLA, conjunctiva and sclera clear  ENT: moist mucous membranes  NECK: Supple, No JVD, Normal thyroid  NERVOUS SYSTEM:  Alert & Oriented X3, Good concentration  CHEST/LUNG: No rales, rhonchi, wheezing, or rubs  HEART: Regular rate and rhythm; No murmurs, rubs, or gallops  ABDOMEN: Soft, Nontender, Nondistended; Bowel sounds present  EXTREMITIES: multiple deep decubiti stage 4.   SKIN: multiple decubiti stage 4.    Care Discussed with Consultants/Other Providers [ x] YES  [ ] NO

## 2018-10-17 NOTE — PROGRESS NOTE ADULT - PROBLEM SELECTOR PLAN 6
IMPROVE VTE Individual Risk Assessment          RISK                                                          Points  [  ] Previous VTE                                                3  [  ] Thrombophilia                                             2  [ x ] Lower limb paralysis                                   2        (unable to hold up >15 seconds)    [  ] Current Cancer                                             2         (within 6 months)  [x  ] Immobilization > 24 hrs                              1  [  ] ICU/CCU stay > 24 hours                             1  [  ] Age > 60                                                         1    IMPROVE VTE Score: 3, dvt ppx    DC plan to [place].
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
## 2018-10-18 PROCEDURE — 99232 SBSQ HOSP IP/OBS MODERATE 35: CPT | Mod: GC

## 2018-10-18 RX ADMIN — OXYCODONE HYDROCHLORIDE 15 MILLIGRAM(S): 5 TABLET ORAL at 06:12

## 2018-10-18 RX ADMIN — OXYCODONE HYDROCHLORIDE 15 MILLIGRAM(S): 5 TABLET ORAL at 14:12

## 2018-10-18 RX ADMIN — OXYCODONE HYDROCHLORIDE 15 MILLIGRAM(S): 5 TABLET ORAL at 15:12

## 2018-10-18 RX ADMIN — OXYCODONE HYDROCHLORIDE 15 MILLIGRAM(S): 5 TABLET ORAL at 22:18

## 2018-10-18 RX ADMIN — Medication 10 MILLIGRAM(S): at 19:04

## 2018-10-18 RX ADMIN — Medication 10 MILLIGRAM(S): at 13:13

## 2018-10-18 RX ADMIN — Medication 10 MILLIGRAM(S): at 01:33

## 2018-10-18 RX ADMIN — SENNA PLUS 2 TABLET(S): 8.6 TABLET ORAL at 22:18

## 2018-10-18 RX ADMIN — Medication 1 TABLET(S): at 12:12

## 2018-10-18 RX ADMIN — OXYCODONE HYDROCHLORIDE 15 MILLIGRAM(S): 5 TABLET ORAL at 22:55

## 2018-10-18 RX ADMIN — Medication 10 MILLIGRAM(S): at 12:13

## 2018-10-18 RX ADMIN — GABAPENTIN 200 MILLIGRAM(S): 400 CAPSULE ORAL at 05:29

## 2018-10-18 RX ADMIN — GABAPENTIN 200 MILLIGRAM(S): 400 CAPSULE ORAL at 17:25

## 2018-10-18 RX ADMIN — OXYCODONE HYDROCHLORIDE 15 MILLIGRAM(S): 5 TABLET ORAL at 06:42

## 2018-10-18 RX ADMIN — Medication 10 MILLIGRAM(S): at 02:03

## 2018-10-18 RX ADMIN — BUPROPION HYDROCHLORIDE 150 MILLIGRAM(S): 150 TABLET, EXTENDED RELEASE ORAL at 12:12

## 2018-10-18 RX ADMIN — ZINC SULFATE TAB 220 MG (50 MG ZINC EQUIVALENT) 220 MILLIGRAM(S): 220 (50 ZN) TAB at 12:12

## 2018-10-18 RX ADMIN — LIDOCAINE 1 PATCH: 4 CREAM TOPICAL at 05:28

## 2018-10-18 RX ADMIN — Medication 10 MILLIGRAM(S): at 19:53

## 2018-10-18 RX ADMIN — Medication 1 MILLIGRAM(S): at 12:12

## 2018-10-18 RX ADMIN — Medication 20 MILLIGRAM(S): at 01:33

## 2018-10-18 NOTE — PROGRESS NOTE ADULT - PROBLEM SELECTOR PLAN 6
IMPROVE VTE Individual Risk Assessment          RISK                                                          Points  [  ] Previous VTE                                                3  [  ] Thrombophilia                                             2  [ x ] Lower limb paralysis                                   2        (unable to hold up >15 seconds)    [  ] Current Cancer                                             2         (within 6 months)  [x  ] Immobilization > 24 hrs                              1  [  ] ICU/CCU stay > 24 hours                             1  [  ] Age > 60                                                         1    IMPROVE VTE Score: 3, dvt ppx    DC plan to [place].

## 2018-10-18 NOTE — PROGRESS NOTE ADULT - PROBLEM SELECTOR PLAN 1
-Condition unchanged.  -change position q2 hours advised, patient educated  -Podiatry and wound care: no further intervention as per surgery.  -continue wound dressing as recommended.

## 2018-10-18 NOTE — PROGRESS NOTE ADULT - SUBJECTIVE AND OBJECTIVE BOX
Patient is a 28y old  Male who presents with a chief complaint of Pain of sacral and heel wounds       INTERVAL HPI/OVERNIGHT EVENTS: No specific complaints      Vital Signs Last 24 Hrs  T(C): 36.5 (18 Oct 2018 05:25), Max: 36.5 (17 Oct 2018 14:30)  T(F): 97.7 (18 Oct 2018 05:25), Max: 97.7 (17 Oct 2018 14:30)  HR: 78 (18 Oct 2018 05:25) (72 - 78)  BP: 104/60 (18 Oct 2018 05:25) (104/60 - 113/50)  BP(mean): --  RR: 18 (18 Oct 2018 05:25) (18 - 18)  SpO2: 100% (18 Oct 2018 05:25) (100% - 100%)          MEDICATIONS  (STANDING):  buPROPion XL . 150 milliGRAM(s) Oral daily  enoxaparin Injectable 40 milliGRAM(s) SubCutaneous every 24 hours  folic acid 1 milliGRAM(s) Oral daily  gabapentin 200 milliGRAM(s) Oral every 12 hours  multivitamin 1 Tablet(s) Oral daily  nystatin Cream 1 Application(s) Topical <User Schedule>  senna 2 Tablet(s) Oral at bedtime  zinc sulfate 220 milliGRAM(s) Oral daily    MEDICATIONS  (PRN):  baclofen 20 milliGRAM(s) Oral two times a day PRN Muscle Spasm  ketorolac 10 milliGRAM(s) Oral every 6 hours PRN Moderate Pain (4 - 6)  lidocaine   Patch 1 Patch Transdermal daily PRN pain  ondansetron Injectable 4 milliGRAM(s) IV Push every 6 hours PRN Nausea  oxyCODONE    IR 15 milliGRAM(s) Oral every 8 hours PRN Severe Pain (7 - 10)      RADIOLOGY & ADDITIONAL TESTS:    Imaging Personally Reviewed:  [x ] YES  [ ] NO    Consultant(s) Notes Reviewed:  [x ] YES  [ ] NO    REVIEW OF SYSTEMS:  CONSTITUTIONAL: No fever, weight loss  EYES: No eye pain, visual disturbances, or discharge  ENMT:  No difficulty hearing, tinnitus, vertigo; No sinus or throat pain  NECK: No pain or stiffness  RESPIRATORY: No cough, wheezing, chills or hemoptysis; No shortness of breath  CARDIOVASCULAR: No chest pain, palpitations, dizziness, or leg swelling  GASTROINTESTINAL: No abdominal or epigastric pain. No nausea, vomiting, or hematemesis; No diarrhea or constipation. No melena or hematochezia.  GENITOURINARY: No dysuria, frequency, hematuria, or incontinence  NEUROLOGICAL: No headaches, memory loss, loss of strength, numbness, or tremors      GENERAL: NAD  HEAD:  Atraumatic, Normocephalic  EYES: EOMI, PERRLA, conjunctiva and sclera clear  ENT: moist mucous membranes  NECK: Supple, No JVD, Normal thyroid  NERVOUS SYSTEM:  Alert & Oriented X3, Good concentration  CHEST/LUNG: No rales, rhonchi, wheezing, or rubs  HEART: Regular rate and rhythm; No murmurs, rubs, or gallops  ABDOMEN: Soft, Nontender, Nondistended; Bowel sounds present  EXTREMITIES: multiple deep decubiti stage 4.   SKIN: multiple decubiti stage 4.    Care Discussed with Consultants/Other Providers [ x] YES  [ ] NO

## 2018-10-19 PROCEDURE — 99232 SBSQ HOSP IP/OBS MODERATE 35: CPT | Mod: GC

## 2018-10-19 RX ADMIN — Medication 10 MILLIGRAM(S): at 17:33

## 2018-10-19 RX ADMIN — LIDOCAINE 1 PATCH: 4 CREAM TOPICAL at 17:33

## 2018-10-19 RX ADMIN — Medication 10 MILLIGRAM(S): at 11:46

## 2018-10-19 RX ADMIN — GABAPENTIN 200 MILLIGRAM(S): 400 CAPSULE ORAL at 17:33

## 2018-10-19 RX ADMIN — OXYCODONE HYDROCHLORIDE 15 MILLIGRAM(S): 5 TABLET ORAL at 06:58

## 2018-10-19 RX ADMIN — ENOXAPARIN SODIUM 40 MILLIGRAM(S): 100 INJECTION SUBCUTANEOUS at 06:21

## 2018-10-19 RX ADMIN — OXYCODONE HYDROCHLORIDE 15 MILLIGRAM(S): 5 TABLET ORAL at 23:18

## 2018-10-19 RX ADMIN — SENNA PLUS 2 TABLET(S): 8.6 TABLET ORAL at 23:18

## 2018-10-19 RX ADMIN — OXYCODONE HYDROCHLORIDE 15 MILLIGRAM(S): 5 TABLET ORAL at 06:21

## 2018-10-19 RX ADMIN — LIDOCAINE 1 PATCH: 4 CREAM TOPICAL at 07:35

## 2018-10-19 RX ADMIN — ZINC SULFATE TAB 220 MG (50 MG ZINC EQUIVALENT) 220 MILLIGRAM(S): 220 (50 ZN) TAB at 11:54

## 2018-10-19 RX ADMIN — Medication 10 MILLIGRAM(S): at 18:42

## 2018-10-19 RX ADMIN — OXYCODONE HYDROCHLORIDE 15 MILLIGRAM(S): 5 TABLET ORAL at 16:11

## 2018-10-19 RX ADMIN — BUPROPION HYDROCHLORIDE 150 MILLIGRAM(S): 150 TABLET, EXTENDED RELEASE ORAL at 11:54

## 2018-10-19 RX ADMIN — LIDOCAINE 1 PATCH: 4 CREAM TOPICAL at 06:50

## 2018-10-19 RX ADMIN — OXYCODONE HYDROCHLORIDE 15 MILLIGRAM(S): 5 TABLET ORAL at 15:15

## 2018-10-19 RX ADMIN — Medication 1 TABLET(S): at 11:54

## 2018-10-19 RX ADMIN — Medication 1 MILLIGRAM(S): at 11:54

## 2018-10-19 RX ADMIN — GABAPENTIN 200 MILLIGRAM(S): 400 CAPSULE ORAL at 06:21

## 2018-10-19 RX ADMIN — Medication 10 MILLIGRAM(S): at 10:46

## 2018-10-19 NOTE — CHART NOTE - NSCHARTNOTEFT_GEN_A_CORE
Reassessment:   28yMalePatient is a 28y old  Male who presents with a chief complaint of Pain of sacral and heel wounds (19 Oct 2018 13:09)      Factors impacting intake: [ ] none [ ] nausea  [ ] vomiting [ ] diarrhea [ ] constipation  [ ]chewing problems [ ] swallowing issues  [X ] other:     Diet Presciption: Diet, Regular:   Supplement Feeding Modality:  Oral  Ensure Enlive Cans or Servings Per Day:  1       Frequency:  Three Times a day (10-03-18 @ 10:17)    Intake: Visited pt. alert, reports appetite good, intake >50% with Ensure Enlive & tolerating, colostomy in place, stated no nutrition questions/concerns presently, wound care noted, awaiting placement & followed by , d/w RN, c/w Regular diet with Ensure Enlive 1 can TID & MVI/minerals/Vit. C 500 mg BID & ZnSO 220mg daily for wound healing as medically feasible.    Daily Weight in k (19 Oct 2018 05:12)  Weight in k (18 Oct 2018 05:25)  Weight in k.8 (17 Oct 2018 05:32)    % Weight Change: Stable    Pertinent Medications: MEDICATIONS  (STANDING):  buPROPion XL . 150 milliGRAM(s) Oral daily  enoxaparin Injectable 40 milliGRAM(s) SubCutaneous every 24 hours  folic acid 1 milliGRAM(s) Oral daily  gabapentin 200 milliGRAM(s) Oral every 12 hours  multivitamin 1 Tablet(s) Oral daily  nystatin Cream 1 Application(s) Topical <User Schedule>  senna 2 Tablet(s) Oral at bedtime  zinc sulfate 220 milliGRAM(s) Oral daily    MEDICATIONS  (PRN):  baclofen 20 milliGRAM(s) Oral two times a day PRN Muscle Spasm  ketorolac 10 milliGRAM(s) Oral every 6 hours PRN Moderate Pain (4 - 6)  lidocaine   Patch 1 Patch Transdermal daily PRN pain  ondansetron Injectable 4 milliGRAM(s) IV Push every 6 hours PRN Nausea  oxyCODONE    IR 15 milliGRAM(s) Oral every 8 hours PRN Severe Pain (7 - 10)    Pertinent Labs:  10-03 [id number] 5.1 % 10-03 Chol 126 mg/dL LDL 67 mg/dL HDL 50 mg/dL Trig 45 mg/dL     CAPILLARY BLOOD GLUCOSE        Skin: multiple pressure ulcers    Estimated Needs:   [ X] no change since previous assessment  [ ] recalculated:     Previous Nutrition Diagnosis:   [ ] Inadequate Energy Intake [ ]Inadequate Oral Intake [ ] Excessive Energy Intake   [ ] Underweight [ ] Increased Nutrient Needs [ ] Overweight/Obesity   [ ] Altered GI Function [ ] Unintended Weight Loss [ ] Food & Nutrition Related Knowledge Deficit [Severe] Malnutrition     Nutrition Diagnosis is [ ] ongoing  [ ] resolved [ ] not applicable     New Nutrition Diagnosis: [ ] not applicable       Interventions: To meet nutrition needs   Recommend  [ ] Change Diet To:  [ ] Nutrition Supplement  [ ] Nutrition Support  [ ] Other:     Monitoring and Evaluation:   [X] PO intake [ x ] Tolerance to diet prescription [ x ] weights [ x ] labs[ x ] follow up per protocol  [ ] other:
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
## 2018-10-19 NOTE — PROGRESS NOTE ADULT - PROBLEM SELECTOR PLAN 1
-Condition unchanged. Patient was again seen by Dr. Case yesterday, and dressing was changed. s/p excisional debridement of devitalized tissues with scalpel to and including subcutaneous level to bilateral foot and ankle ulcers.  -change position q2 hours   -continue wound dressing as recommended.
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
## 2018-10-19 NOTE — PROGRESS NOTE ADULT - SUBJECTIVE AND OBJECTIVE BOX
Patient is a 28y old  Male who presents with a chief complaint of Pain of sacral and heel wounds       INTERVAL HPI/OVERNIGHT EVENTS: No specific complaints      Vital Signs Last 24 Hrs  T(C): 37.2 (19 Oct 2018 05:12), Max: 37.2 (19 Oct 2018 05:12)  T(F): 99 (19 Oct 2018 05:12), Max: 99 (19 Oct 2018 05:12)  HR: 78 (19 Oct 2018 05:12) (78 - 81)  BP: 110/52 (19 Oct 2018 05:12) (110/52 - 116/65)  BP(mean): --  RR: 18 (19 Oct 2018 05:12) (18 - 18)  SpO2: 100% (19 Oct 2018 05:12) (100% - 100%)      MEDICATIONS  (STANDING):  buPROPion XL . 150 milliGRAM(s) Oral daily  enoxaparin Injectable 40 milliGRAM(s) SubCutaneous every 24 hours  folic acid 1 milliGRAM(s) Oral daily  gabapentin 200 milliGRAM(s) Oral every 12 hours  multivitamin 1 Tablet(s) Oral daily  nystatin Cream 1 Application(s) Topical <User Schedule>  senna 2 Tablet(s) Oral at bedtime  zinc sulfate 220 milliGRAM(s) Oral daily    MEDICATIONS  (PRN):  baclofen 20 milliGRAM(s) Oral two times a day PRN Muscle Spasm  ketorolac 10 milliGRAM(s) Oral every 6 hours PRN Moderate Pain (4 - 6)  lidocaine   Patch 1 Patch Transdermal daily PRN pain  ondansetron Injectable 4 milliGRAM(s) IV Push every 6 hours PRN Nausea  oxyCODONE    IR 15 milliGRAM(s) Oral every 8 hours PRN Severe Pain (7 - 10)      RADIOLOGY & ADDITIONAL TESTS:    Imaging Personally Reviewed:  [x ] YES  [ ] NO    Consultant(s) Notes Reviewed:  [x ] YES  [ ] NO    REVIEW OF SYSTEMS:  CONSTITUTIONAL: No fever, weight loss  EYES: No eye pain, visual disturbances, or discharge  ENMT:  No difficulty hearing, tinnitus, vertigo; No sinus or throat pain  NECK: No pain or stiffness  RESPIRATORY: No cough, wheezing, chills or hemoptysis; No shortness of breath  CARDIOVASCULAR: No chest pain, palpitations, dizziness, or leg swelling  GASTROINTESTINAL: No abdominal or epigastric pain. No nausea, vomiting, or hematemesis; No diarrhea or constipation. No melena or hematochezia.  GENITOURINARY: No dysuria, frequency, hematuria, or incontinence  NEUROLOGICAL: No headaches, memory loss, loss of strength, numbness, or tremors      GENERAL: NAD  HEAD:  Atraumatic, Normocephalic  EYES: EOMI, PERRLA, conjunctiva and sclera clear  ENT: moist mucous membranes  NECK: Supple, No JVD, Normal thyroid  NERVOUS SYSTEM:  Alert & Oriented X3, Good concentration  CHEST/LUNG: No rales, rhonchi, wheezing, or rubs  HEART: Regular rate and rhythm; No murmurs, rubs, or gallops  ABDOMEN: Soft, Nontender, Nondistended; Bowel sounds present  EXTREMITIES: multiple deep decubiti stage 4.   SKIN: multiple decubiti stage 4.    Care Discussed with Consultants/Other Providers [ x] YES  [ ] NO

## 2018-10-20 PROCEDURE — 99232 SBSQ HOSP IP/OBS MODERATE 35: CPT | Mod: GC

## 2018-10-20 RX ORDER — OXYCODONE AND ACETAMINOPHEN 5; 325 MG/1; MG/1
1 TABLET ORAL EVERY 6 HOURS
Qty: 0 | Refills: 0 | Status: DISCONTINUED | OUTPATIENT
Start: 2018-10-20 | End: 2018-10-20

## 2018-10-20 RX ORDER — OXYCODONE HYDROCHLORIDE 5 MG/1
30 TABLET ORAL EVERY 8 HOURS
Qty: 0 | Refills: 0 | Status: DISCONTINUED | OUTPATIENT
Start: 2018-10-20 | End: 2018-10-21

## 2018-10-20 RX ADMIN — BUPROPION HYDROCHLORIDE 150 MILLIGRAM(S): 150 TABLET, EXTENDED RELEASE ORAL at 11:09

## 2018-10-20 RX ADMIN — OXYCODONE HYDROCHLORIDE 15 MILLIGRAM(S): 5 TABLET ORAL at 00:06

## 2018-10-20 RX ADMIN — OXYCODONE HYDROCHLORIDE 30 MILLIGRAM(S): 5 TABLET ORAL at 21:06

## 2018-10-20 RX ADMIN — ENOXAPARIN SODIUM 40 MILLIGRAM(S): 100 INJECTION SUBCUTANEOUS at 06:20

## 2018-10-20 RX ADMIN — ZINC SULFATE TAB 220 MG (50 MG ZINC EQUIVALENT) 220 MILLIGRAM(S): 220 (50 ZN) TAB at 11:09

## 2018-10-20 RX ADMIN — OXYCODONE AND ACETAMINOPHEN 1 TABLET(S): 5; 325 TABLET ORAL at 12:00

## 2018-10-20 RX ADMIN — LIDOCAINE 1 PATCH: 4 CREAM TOPICAL at 07:12

## 2018-10-20 RX ADMIN — OXYCODONE HYDROCHLORIDE 30 MILLIGRAM(S): 5 TABLET ORAL at 21:36

## 2018-10-20 RX ADMIN — Medication 1 MILLIGRAM(S): at 11:09

## 2018-10-20 RX ADMIN — Medication 10 MILLIGRAM(S): at 17:25

## 2018-10-20 RX ADMIN — Medication 10 MILLIGRAM(S): at 18:20

## 2018-10-20 RX ADMIN — Medication 10 MILLIGRAM(S): at 01:03

## 2018-10-20 RX ADMIN — OXYCODONE HYDROCHLORIDE 15 MILLIGRAM(S): 5 TABLET ORAL at 07:14

## 2018-10-20 RX ADMIN — LIDOCAINE 1 PATCH: 4 CREAM TOPICAL at 03:08

## 2018-10-20 RX ADMIN — OXYCODONE AND ACETAMINOPHEN 1 TABLET(S): 5; 325 TABLET ORAL at 11:09

## 2018-10-20 RX ADMIN — OXYCODONE HYDROCHLORIDE 30 MILLIGRAM(S): 5 TABLET ORAL at 12:53

## 2018-10-20 RX ADMIN — OXYCODONE HYDROCHLORIDE 15 MILLIGRAM(S): 5 TABLET ORAL at 06:20

## 2018-10-20 RX ADMIN — OXYCODONE HYDROCHLORIDE 30 MILLIGRAM(S): 5 TABLET ORAL at 14:00

## 2018-10-20 RX ADMIN — Medication 20 MILLIGRAM(S): at 11:09

## 2018-10-20 RX ADMIN — LIDOCAINE 1 PATCH: 4 CREAM TOPICAL at 16:00

## 2018-10-20 RX ADMIN — Medication 1 TABLET(S): at 11:09

## 2018-10-20 RX ADMIN — GABAPENTIN 200 MILLIGRAM(S): 400 CAPSULE ORAL at 06:20

## 2018-10-20 RX ADMIN — GABAPENTIN 200 MILLIGRAM(S): 400 CAPSULE ORAL at 17:25

## 2018-10-20 RX ADMIN — Medication 10 MILLIGRAM(S): at 01:43

## 2018-10-20 NOTE — PROGRESS NOTE ADULT - SUBJECTIVE AND OBJECTIVE BOX
PGY 1 Note discussed with supervising resident and primary attending    Patient is a 28y old  Male who presents with a chief complaint of Pain of sacral and heel wounds (19 Oct 2018 13:09)      INTERVAL HPI/OVERNIGHT EVENTS: No acute events overnight, remains afebrile; HD stable  Patient reports c/o pain from known sacral ulcers not managed with current pain medication regimen    MEDICATIONS  (STANDING):  buPROPion XL . 150 milliGRAM(s) Oral daily  enoxaparin Injectable 40 milliGRAM(s) SubCutaneous every 24 hours  folic acid 1 milliGRAM(s) Oral daily  gabapentin 200 milliGRAM(s) Oral every 12 hours  multivitamin 1 Tablet(s) Oral daily  nystatin Cream 1 Application(s) Topical <User Schedule>  senna 2 Tablet(s) Oral at bedtime  zinc sulfate 220 milliGRAM(s) Oral daily    MEDICATIONS  (PRN):  baclofen 20 milliGRAM(s) Oral two times a day PRN Muscle Spasm  ketorolac 10 milliGRAM(s) Oral every 6 hours PRN Moderate Pain (4 - 6)  lidocaine   Patch 1 Patch Transdermal daily PRN pain  ondansetron Injectable 4 milliGRAM(s) IV Push every 6 hours PRN Nausea  oxyCODONE    5 mG/acetaminophen 325 mG 1 Tablet(s) Oral every 6 hours PRN Severe Pain (7 - 10)      __________________________________________________  REVIEW OF SYSTEMS:    CONSTITUTIONAL: No fever, weight loss  EYES: No eye pain, visual disturbances, or discharge  ENMT:  No difficulty hearing, tinnitus, vertigo; No sinus or throat pain  NECK: No pain or stiffness  RESPIRATORY: No cough, wheezing, chills or hemoptysis; No shortness of breath  CARDIOVASCULAR: No chest pain, palpitations, dizziness, or leg swelling  GASTROINTESTINAL: No abdominal or epigastric pain. No nausea, vomiting, or hematemesis; No diarrhea or constipation. No melena or hematochezia.  GENITOURINARY: No dysuria, frequency, hematuria, or incontinence  NEUROLOGICAL: No headaches, memory loss, loss of strength, numbness, or tremors      Vital Signs Last 24 Hrs  T(C): --  T(F): --  HR: 77 (20 Oct 2018 05:30) (77 - 77)  BP: 106/53 (20 Oct 2018 05:30) (106/53 - 106/53)  BP(mean): --  RR: 18 (20 Oct 2018 05:30) (18 - 18)  SpO2: 100% (20 Oct 2018 05:30) (100% - 100%)    ________________________________________________  PHYSICAL EXAM:    GENERAL: NAD  HEAD:  Atraumatic, Normocephalic  EYES: EOMI, PERRLA, conjunctiva and sclera clear  ENT: moist mucous membranes  NECK: Supple, No JVD, Normal thyroid  NERVOUS SYSTEM:  Alert & Oriented X3, Good concentration  CHEST/LUNG: No rales, rhonchi, wheezing, or rubs  HEART: Regular rate and rhythm; No murmurs, rubs, or gallops  ABDOMEN: Soft, Nontender, Nondistended; Bowel sounds present  EXTREMITIES: multiple deep decubiti stage 4.   SKIN: multiple decubiti stage 4.    _________________________________________________  LABS:              CAPILLARY BLOOD GLUCOSE            RADIOLOGY & ADDITIONAL TESTS:    Imaging Personally Reviewed:  YES    Consultant(s) Notes Reviewed:   YES    Care Discussed with Consultants :     Plan of care was discussed with patient and /or primary care giver; all questions and concerns were addressed and care was aligned with patient's wishes.
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
## 2018-10-21 PROCEDURE — 99232 SBSQ HOSP IP/OBS MODERATE 35: CPT

## 2018-10-21 RX ORDER — OXYCODONE AND ACETAMINOPHEN 5; 325 MG/1; MG/1
1 TABLET ORAL EVERY 4 HOURS
Qty: 0 | Refills: 0 | Status: DISCONTINUED | OUTPATIENT
Start: 2018-10-21 | End: 2018-10-21

## 2018-10-21 RX ORDER — OXYCODONE AND ACETAMINOPHEN 5; 325 MG/1; MG/1
1 TABLET ORAL EVERY 8 HOURS
Qty: 0 | Refills: 0 | Status: DISCONTINUED | OUTPATIENT
Start: 2018-10-21 | End: 2018-10-21

## 2018-10-21 RX ORDER — OXYCODONE HYDROCHLORIDE 5 MG/1
30 TABLET ORAL EVERY 8 HOURS
Qty: 0 | Refills: 0 | Status: DISCONTINUED | OUTPATIENT
Start: 2018-10-21 | End: 2018-10-22

## 2018-10-21 RX ADMIN — ZINC SULFATE TAB 220 MG (50 MG ZINC EQUIVALENT) 220 MILLIGRAM(S): 220 (50 ZN) TAB at 14:06

## 2018-10-21 RX ADMIN — GABAPENTIN 200 MILLIGRAM(S): 400 CAPSULE ORAL at 17:17

## 2018-10-21 RX ADMIN — NYSTATIN CREAM 1 APPLICATION(S): 100000 CREAM TOPICAL at 14:57

## 2018-10-21 RX ADMIN — OXYCODONE HYDROCHLORIDE 30 MILLIGRAM(S): 5 TABLET ORAL at 15:56

## 2018-10-21 RX ADMIN — GABAPENTIN 200 MILLIGRAM(S): 400 CAPSULE ORAL at 05:04

## 2018-10-21 RX ADMIN — Medication 1 MILLIGRAM(S): at 14:09

## 2018-10-21 RX ADMIN — OXYCODONE HYDROCHLORIDE 30 MILLIGRAM(S): 5 TABLET ORAL at 05:34

## 2018-10-21 RX ADMIN — OXYCODONE AND ACETAMINOPHEN 1 TABLET(S): 5; 325 TABLET ORAL at 14:47

## 2018-10-21 RX ADMIN — OXYCODONE HYDROCHLORIDE 30 MILLIGRAM(S): 5 TABLET ORAL at 05:04

## 2018-10-21 RX ADMIN — OXYCODONE HYDROCHLORIDE 30 MILLIGRAM(S): 5 TABLET ORAL at 17:16

## 2018-10-21 RX ADMIN — BUPROPION HYDROCHLORIDE 150 MILLIGRAM(S): 150 TABLET, EXTENDED RELEASE ORAL at 14:09

## 2018-10-21 RX ADMIN — Medication 1 TABLET(S): at 14:08
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
## 2018-10-22 LAB
ALBUMIN SERPL ELPH-MCNC: 2.8 G/DL — LOW (ref 3.5–5)
ALP SERPL-CCNC: 78 U/L — SIGNIFICANT CHANGE UP (ref 40–120)
ALT FLD-CCNC: 16 U/L DA — SIGNIFICANT CHANGE UP (ref 10–60)
ANION GAP SERPL CALC-SCNC: 6 MMOL/L — SIGNIFICANT CHANGE UP (ref 5–17)
AST SERPL-CCNC: 15 U/L — SIGNIFICANT CHANGE UP (ref 10–40)
BASOPHILS # BLD AUTO: 0.1 K/UL — SIGNIFICANT CHANGE UP (ref 0–0.2)
BASOPHILS NFR BLD AUTO: 2.5 % — HIGH (ref 0–2)
BILIRUB SERPL-MCNC: 0.1 MG/DL — LOW (ref 0.2–1.2)
BUN SERPL-MCNC: 14 MG/DL — SIGNIFICANT CHANGE UP (ref 7–18)
CALCIUM SERPL-MCNC: 9.1 MG/DL — SIGNIFICANT CHANGE UP (ref 8.4–10.5)
CHLORIDE SERPL-SCNC: 101 MMOL/L — SIGNIFICANT CHANGE UP (ref 96–108)
CO2 SERPL-SCNC: 28 MMOL/L — SIGNIFICANT CHANGE UP (ref 22–31)
CREAT SERPL-MCNC: 0.49 MG/DL — LOW (ref 0.5–1.3)
EOSINOPHIL # BLD AUTO: 0.2 K/UL — SIGNIFICANT CHANGE UP (ref 0–0.5)
EOSINOPHIL NFR BLD AUTO: 4.8 % — SIGNIFICANT CHANGE UP (ref 0–6)
GLUCOSE SERPL-MCNC: 83 MG/DL — SIGNIFICANT CHANGE UP (ref 70–99)
HCT VFR BLD CALC: 33 % — LOW (ref 39–50)
HGB BLD-MCNC: 10.3 G/DL — LOW (ref 13–17)
LYMPHOCYTES # BLD AUTO: 1.7 K/UL — SIGNIFICANT CHANGE UP (ref 1–3.3)
LYMPHOCYTES # BLD AUTO: 43.5 % — SIGNIFICANT CHANGE UP (ref 13–44)
MAGNESIUM SERPL-MCNC: 1.9 MG/DL — SIGNIFICANT CHANGE UP (ref 1.6–2.6)
MCHC RBC-ENTMCNC: 24.2 PG — LOW (ref 27–34)
MCHC RBC-ENTMCNC: 31.1 GM/DL — LOW (ref 32–36)
MCV RBC AUTO: 77.9 FL — LOW (ref 80–100)
MONOCYTES # BLD AUTO: 0.3 K/UL — SIGNIFICANT CHANGE UP (ref 0–0.9)
MONOCYTES NFR BLD AUTO: 7.1 % — SIGNIFICANT CHANGE UP (ref 2–14)
NEUTROPHILS # BLD AUTO: 1.7 K/UL — LOW (ref 1.8–7.4)
NEUTROPHILS NFR BLD AUTO: 42.1 % — LOW (ref 43–77)
PHOSPHATE SERPL-MCNC: 4.4 MG/DL — SIGNIFICANT CHANGE UP (ref 2.5–4.5)
PLATELET # BLD AUTO: 239 K/UL — SIGNIFICANT CHANGE UP (ref 150–400)
POTASSIUM SERPL-MCNC: 4 MMOL/L — SIGNIFICANT CHANGE UP (ref 3.5–5.3)
POTASSIUM SERPL-SCNC: 4 MMOL/L — SIGNIFICANT CHANGE UP (ref 3.5–5.3)
PROT SERPL-MCNC: 8.8 G/DL — HIGH (ref 6–8.3)
RBC # BLD: 4.24 M/UL — SIGNIFICANT CHANGE UP (ref 4.2–5.8)
RBC # FLD: 16.1 % — HIGH (ref 10.3–14.5)
SODIUM SERPL-SCNC: 135 MMOL/L — SIGNIFICANT CHANGE UP (ref 135–145)
WBC # BLD: 4 K/UL — SIGNIFICANT CHANGE UP (ref 3.8–10.5)
WBC # FLD AUTO: 4 K/UL — SIGNIFICANT CHANGE UP (ref 3.8–10.5)

## 2018-10-22 PROCEDURE — 99232 SBSQ HOSP IP/OBS MODERATE 35: CPT | Mod: GC

## 2018-10-22 RX ORDER — OXYCODONE HYDROCHLORIDE 5 MG/1
30 TABLET ORAL ONCE
Qty: 0 | Refills: 0 | Status: DISCONTINUED | OUTPATIENT
Start: 2018-10-22 | End: 2018-10-22

## 2018-10-22 RX ORDER — OXYCODONE AND ACETAMINOPHEN 5; 325 MG/1; MG/1
1 TABLET ORAL ONCE
Qty: 0 | Refills: 0 | Status: DISCONTINUED | OUTPATIENT
Start: 2018-10-22 | End: 2018-10-22

## 2018-10-22 RX ADMIN — OXYCODONE HYDROCHLORIDE 30 MILLIGRAM(S): 5 TABLET ORAL at 10:33

## 2018-10-22 RX ADMIN — GABAPENTIN 200 MILLIGRAM(S): 400 CAPSULE ORAL at 05:54

## 2018-10-22 RX ADMIN — GABAPENTIN 200 MILLIGRAM(S): 400 CAPSULE ORAL at 18:13

## 2018-10-22 RX ADMIN — OXYCODONE HYDROCHLORIDE 30 MILLIGRAM(S): 5 TABLET ORAL at 18:12

## 2018-10-22 RX ADMIN — ENOXAPARIN SODIUM 40 MILLIGRAM(S): 100 INJECTION SUBCUTANEOUS at 09:01

## 2018-10-22 RX ADMIN — OXYCODONE HYDROCHLORIDE 30 MILLIGRAM(S): 5 TABLET ORAL at 09:01

## 2018-10-22 RX ADMIN — OXYCODONE HYDROCHLORIDE 30 MILLIGRAM(S): 5 TABLET ORAL at 00:40

## 2018-10-22 RX ADMIN — OXYCODONE HYDROCHLORIDE 30 MILLIGRAM(S): 5 TABLET ORAL at 18:50

## 2018-10-22 RX ADMIN — OXYCODONE HYDROCHLORIDE 30 MILLIGRAM(S): 5 TABLET ORAL at 00:10

## 2018-10-22 RX ADMIN — LIDOCAINE 1 PATCH: 4 CREAM TOPICAL at 23:47
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
## 2018-10-22 NOTE — PROGRESS NOTE ADULT - SUBJECTIVE AND OBJECTIVE BOX
Patient is a 28y old  Male who presents with a chief complaint of Pain of sacral and heel wounds (20 Oct 2018 09:58)    Patient reported back pain this morning resolved with oxycodone, no other complaints, DC planning.    INTERVAL HPI/OVERNIGHT EVENTS:  T(C): 37.3 (10-21-18 @ 20:36), Max: 37.3 (10-21-18 @ 20:36)  HR: 61 (10-22-18 @ 05:20) (61 - 76)  BP: 138/77 (10-22-18 @ 05:20) (120/60 - 138/77)  RR: 16 (10-22-18 @ 05:20) (16 - 16)  SpO2: 100% (10-22-18 @ 05:20) (100% - 100%)  Wt(kg): --  I&O's Summary    21 Oct 2018 07:01  -  22 Oct 2018 07:00  --------------------------------------------------------  IN: 0 mL / OUT: 1375 mL / NET: -1375 mL    MEDICATIONS  (STANDING):  buPROPion XL . 150 milliGRAM(s) Oral daily  enoxaparin Injectable 40 milliGRAM(s) SubCutaneous every 24 hours  folic acid 1 milliGRAM(s) Oral daily  gabapentin 200 milliGRAM(s) Oral every 12 hours  multivitamin 1 Tablet(s) Oral daily  nystatin Cream 1 Application(s) Topical <User Schedule>  senna 2 Tablet(s) Oral at bedtime  zinc sulfate 220 milliGRAM(s) Oral daily    MEDICATIONS  (PRN):  baclofen 20 milliGRAM(s) Oral two times a day PRN Muscle Spasm  lidocaine   Patch 1 Patch Transdermal daily PRN pain  ondansetron Injectable 4 milliGRAM(s) IV Push every 6 hours PRN Nausea    PHYSICAL EXAM:  HEAD:  Atraumatic, Normocephalic  EYES: EOMI, PERRLA, conjunctiva and sclera clear  ENT: moist mucous membranes  NECK: Supple, No JVD, Normal thyroid  NERVOUS SYSTEM:  Alert & Oriented X3, Good concentration  CHEST/LUNG: No rales, rhonchi, wheezing, or rubs  HEART: Regular rate and rhythm; No murmurs, rubs, or gallops  ABDOMEN: Soft, Nontender, Nondistended; Bowel sounds present  EXTREMITIES: multiple deep decubiti stage 4  SKIN: multiple decubiti stage 4 sacral and b/l LE    LABS:                        10.3   4.0   )-----------( 239      ( 22 Oct 2018 06:10 )             33.0     10-22    135  |  101  |  14  ----------------------------<  83  4.0   |  28  |  0.49<L>    Ca    9.1      22 Oct 2018 06:10  Phos  4.4     10-22  Mg     1.9     10-22    TPro  8.8<H>  /  Alb  2.8<L>  /  TBili  0.1<L>  /  DBili  x   /  AST  15  /  ALT  16  /  AlkPhos  78  10-22
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
## 2018-10-23 PROCEDURE — 99232 SBSQ HOSP IP/OBS MODERATE 35: CPT | Mod: GC

## 2018-10-23 RX ORDER — ACETAMINOPHEN 500 MG
650 TABLET ORAL EVERY 6 HOURS
Qty: 0 | Refills: 0 | Status: DISCONTINUED | OUTPATIENT
Start: 2018-10-23 | End: 2018-11-01

## 2018-10-23 RX ORDER — OXYCODONE HYDROCHLORIDE 5 MG/1
30 TABLET ORAL ONCE
Qty: 0 | Refills: 0 | Status: DISCONTINUED | OUTPATIENT
Start: 2018-10-23 | End: 2018-10-23

## 2018-10-23 RX ORDER — OXYCODONE HYDROCHLORIDE 5 MG/1
15 TABLET ORAL EVERY 6 HOURS
Qty: 0 | Refills: 0 | Status: DISCONTINUED | OUTPATIENT
Start: 2018-10-23 | End: 2018-10-30

## 2018-10-23 RX ADMIN — NYSTATIN CREAM 1 APPLICATION(S): 100000 CREAM TOPICAL at 13:34

## 2018-10-23 RX ADMIN — Medication 100 MILLIGRAM(S): at 17:53

## 2018-10-23 RX ADMIN — Medication 20 MILLIGRAM(S): at 10:43

## 2018-10-23 RX ADMIN — OXYCODONE HYDROCHLORIDE 30 MILLIGRAM(S): 5 TABLET ORAL at 02:40

## 2018-10-23 RX ADMIN — GABAPENTIN 200 MILLIGRAM(S): 400 CAPSULE ORAL at 05:32

## 2018-10-23 RX ADMIN — LIDOCAINE 1 PATCH: 4 CREAM TOPICAL at 09:00

## 2018-10-23 RX ADMIN — Medication 1 TABLET(S): at 13:34

## 2018-10-23 RX ADMIN — OXYCODONE HYDROCHLORIDE 30 MILLIGRAM(S): 5 TABLET ORAL at 10:43

## 2018-10-23 RX ADMIN — OXYCODONE HYDROCHLORIDE 30 MILLIGRAM(S): 5 TABLET ORAL at 11:00

## 2018-10-23 RX ADMIN — BUPROPION HYDROCHLORIDE 150 MILLIGRAM(S): 150 TABLET, EXTENDED RELEASE ORAL at 13:34

## 2018-10-23 RX ADMIN — ZINC SULFATE TAB 220 MG (50 MG ZINC EQUIVALENT) 220 MILLIGRAM(S): 220 (50 ZN) TAB at 13:35

## 2018-10-23 RX ADMIN — OXYCODONE HYDROCHLORIDE 30 MILLIGRAM(S): 5 TABLET ORAL at 02:10

## 2018-10-23 RX ADMIN — Medication 1 MILLIGRAM(S): at 13:35

## 2018-10-23 RX ADMIN — ENOXAPARIN SODIUM 40 MILLIGRAM(S): 100 INJECTION SUBCUTANEOUS at 05:32

## 2018-10-23 RX ADMIN — GABAPENTIN 200 MILLIGRAM(S): 400 CAPSULE ORAL at 17:53

## 2018-10-23 RX ADMIN — OXYCODONE HYDROCHLORIDE 15 MILLIGRAM(S): 5 TABLET ORAL at 23:28

## 2018-10-23 NOTE — CHART NOTE - NSCHARTNOTEFT_GEN_A_CORE
Reassessment:   28yMalePatient is a 28y old  Male who presents with a chief complaint of Pain of sacral and heel wounds (23 Oct 2018 14:37)      Factors impacting intake: [ ] none [ ] nausea  [ ] vomiting [ ] diarrhea [ ] constipation  [ ]chewing problems [ ] swallowing issues  [X ] other: Homeless     Diet Presciption: Diet, Regular:   Supplement Feeding Modality:  Oral  Ensure Enlive Cans or Servings Per Day:  1       Frequency:  Three Times a day (10-03-18 @ 10:17)    Intake: Visited pt. alert reports eating well, intake >50% of meals & tolerating, drinking >50% of Ensure Enlive daily, wound care going, awaiting placement & followed by . D/w RN, c/w Regular diet with Ensure Enlive 1 can BID, as medically feasible. RD available.     Daily Weight in k (23 Oct 2018 05:19)  Weight in k (19 Oct 2018 05:12)  Weight in k (18 Oct 2018 05:25)  Weight in k.8 (17 Oct 2018 05:32)    % Weight Change: 4%    Pertinent Medications: MEDICATIONS  (STANDING):  buPROPion XL . 150 milliGRAM(s) Oral daily  doxycycline hyclate Capsule 100 milliGRAM(s) Oral every 12 hours  enoxaparin Injectable 40 milliGRAM(s) SubCutaneous every 24 hours  folic acid 1 milliGRAM(s) Oral daily  gabapentin 200 milliGRAM(s) Oral every 12 hours  multivitamin 1 Tablet(s) Oral daily  nystatin Cream 1 Application(s) Topical <User Schedule>  senna 2 Tablet(s) Oral at bedtime  zinc sulfate 220 milliGRAM(s) Oral daily    MEDICATIONS  (PRN):  acetaminophen   Tablet .. 650 milliGRAM(s) Oral every 6 hours PRN Moderate Pain (4 - 6)  baclofen 20 milliGRAM(s) Oral two times a day PRN Muscle Spasm  lidocaine   Patch 1 Patch Transdermal daily PRN pain  ondansetron Injectable 4 milliGRAM(s) IV Push every 6 hours PRN Nausea  oxyCODONE    IR 15 milliGRAM(s) Oral every 6 hours PRN Severe Pain (7 - 10)    Pertinent Labs: 10-22 Na135 mmol/L Glu 83 mg/dL K+ 4.0 mmol/L Cr  0.49 mg/dL<L> BUN 14 mg/dL 10-22 Phos 4.4 mg/dL 10-22 Alb 2.8 g/dL<L> 10-03 [id number] 5.1 % 10-03 Chol 126 mg/dL LDL 67 mg/dL HDL 50 mg/dL Trig 45 mg/dL     CAPILLARY BLOOD GLUCOSE        Skin: Pressure ulcers     Estimated Needs:   [X ] no change since previous assessment  [ ] recalculated:     Previous Nutrition Diagnosis:   [ ] Inadequate Energy Intake [ ]Inadequate Oral Intake [ ] Excessive Energy Intake   [ ] Underweight [ ] Increased Nutrient Needs [ ] Overweight/Obesity   [ ] Altered GI Function [ ] Unintended Weight Loss [ ] Food & Nutrition Related Knowledge Deficit [Severe] Malnutrition     Nutrition Diagnosis is [ ] ongoing  [ ] resolved [ ] not applicable     New Nutrition Diagnosis: [ ] not applicable       Interventions: To meet nutrition needs   Recommend  [ ] Change Diet To:  [ ] Nutrition Supplement  [ ] Nutrition Support  [ ] Other:     Monitoring and Evaluation:   [ ] PO intake [ x ] Tolerance to diet prescription [ x ] weights [ x ] labs[ x ] follow up per protocol  [ ] other:
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
## 2018-10-23 NOTE — PROGRESS NOTE ADULT - SUBJECTIVE AND OBJECTIVE BOX
28y Male    Meds:  doxycycline hyclate Capsule 100 milliGRAM(s) Oral every 12 hours    Allergies    No Known Allergies    Intolerances        VITALS:  Vital Signs Last 24 Hrs  T(C): 36.8 (23 Oct 2018 05:19), Max: 37 (22 Oct 2018 20:32)  T(F): 98.2 (23 Oct 2018 05:19), Max: 98.6 (22 Oct 2018 20:32)  HR: 67 (23 Oct 2018 05:19) (67 - 72)  BP: 129/77 (23 Oct 2018 05:19) (129/77 - 130/65)  BP(mean): --  RR: 18 (23 Oct 2018 05:19) (17 - 18)  SpO2: 100% (23 Oct 2018 05:19) (100% - 100%)    LABS/DIAGNOSTIC TESTS:                          10.3   4.0   )-----------( 239      ( 22 Oct 2018 06:10 )             33.0         10-22    135  |  101  |  14  ----------------------------<  83  4.0   |  28  |  0.49<L>    Ca    9.1      22 Oct 2018 06:10  Phos  4.4     10-22  Mg     1.9     10-22    TPro  8.8<H>  /  Alb  2.8<L>  /  TBili  0.1<L>  /  DBili  x   /  AST  15  /  ALT  16  /  AlkPhos  78  10-22      LIVER FUNCTIONS - ( 22 Oct 2018 06:10 )  Alb: 2.8 g/dL / Pro: 8.8 g/dL / ALK PHOS: 78 U/L / ALT: 16 U/L DA / AST: 15 U/L / GGT: x             CULTURES: .Urine Clean Catch (Midstream)  10-05 @ 18:18   >100,000 CFU/ml Enterococcus faecalis (vancomycin resistant)  Normal Urogenital pravin present  --  Enterococcus faecalis (vancomycin resistant)      .Urine Catheterized  10-04 @ 11:04   Culture grew 3 or more types of organisms which indicate  collection contamination; consider recollection only if clinically  indicated.  --  --            RADIOLOGY:      ROS:  [  ] UNABLE TO ELICIT 28y Male who has been here for several weeks now because of a placement issue, asked to reeval pt as he developed a rash over the left side of his upper back that is slightly itchy and painful. He has no fevers or chills.    Meds:  doxycycline hyclate Capsule 100 milliGRAM(s) Oral every 12 hours    Allergies    No Known Allergies    Intolerances        VITALS:  Vital Signs Last 24 Hrs  T(C): 36.8 (23 Oct 2018 05:19), Max: 37 (22 Oct 2018 20:32)  T(F): 98.2 (23 Oct 2018 05:19), Max: 98.6 (22 Oct 2018 20:32)  HR: 67 (23 Oct 2018 05:19) (67 - 72)  BP: 129/77 (23 Oct 2018 05:19) (129/77 - 130/65)  BP(mean): --  RR: 18 (23 Oct 2018 05:19) (17 - 18)  SpO2: 100% (23 Oct 2018 05:19) (100% - 100%)    LABS/DIAGNOSTIC TESTS:                          10.3   4.0   )-----------( 239      ( 22 Oct 2018 06:10 )             33.0         10-22    135  |  101  |  14  ----------------------------<  83  4.0   |  28  |  0.49<L>    Ca    9.1      22 Oct 2018 06:10  Phos  4.4     10-22  Mg     1.9     10-22    TPro  8.8<H>  /  Alb  2.8<L>  /  TBili  0.1<L>  /  DBili  x   /  AST  15  /  ALT  16  /  AlkPhos  78  10-22      LIVER FUNCTIONS - ( 22 Oct 2018 06:10 )  Alb: 2.8 g/dL / Pro: 8.8 g/dL / ALK PHOS: 78 U/L / ALT: 16 U/L DA / AST: 15 U/L / GGT: x             CULTURES: .Urine Clean Catch (Midstream)  10-05 @ 18:18   >100,000 CFU/ml Enterococcus faecalis (vancomycin resistant)  Normal Urogenital pravin present  --  Enterococcus faecalis (vancomycin resistant)      .Urine Catheterized  10-04 @ 11:04   Culture grew 3 or more types of organisms which indicate  collection contamination; consider recollection only if clinically  indicated.  --  --            RADIOLOGY:      ROS:  [  ] UNABLE TO ELICIT
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
## 2018-10-23 NOTE — PROGRESS NOTE ADULT - PROBLEM SELECTOR PLAN 1
due to  paraplegia, chronic wounds/decubiti ulcers   - oxycodone 15mg q 6 hrs   - baclofen 20 mg prn for spasms    - c/w gabapentin   - lidocaine patch   - turn and position  - wound care   - pending discharge to NH facility due to  paraplegia, chronic wounds/decubiti ulcers   - oxycodone 15mg q 6 hrs   - baclofen 20 mg prn for spasms    - tylenol 650 mg prn   - c/w gabapentin   - lidocaine patch   - turn and position  - wound care   - pending discharge to NH facility

## 2018-10-23 NOTE — PROGRESS NOTE ADULT - SUBJECTIVE AND OBJECTIVE BOX
Patient is a 28y old  Male who presents with a chief complaint of Pain of sacral and heel wounds (22 Oct 2018 21:37)    Patient states no complaints but still occasional back pain only relieved with oxycodone IR. No other complaints. Awaiting discharge.    INTERVAL HPI/OVERNIGHT EVENTS:  T(C): 36.8 (10-23-18 @ 05:19), Max: 37 (10-22-18 @ 20:32)  HR: 67 (10-23-18 @ 05:19) (67 - 72)  BP: 129/77 (10-23-18 @ 05:19) (129/77 - 130/65)  RR: 18 (10-23-18 @ 05:19) (17 - 18)  SpO2: 100% (10-23-18 @ 05:19) (100% - 100%)    22 Oct 2018 07:01  -  23 Oct 2018 07:00  --------------------------------------------------------  IN: 0 mL / OUT: 2251 mL / NET: -2251 mL    MEDICATIONS  (STANDING):  buPROPion XL . 150 milliGRAM(s) Oral daily  enoxaparin Injectable 40 milliGRAM(s) SubCutaneous every 24 hours  folic acid 1 milliGRAM(s) Oral daily  gabapentin 200 milliGRAM(s) Oral every 12 hours  multivitamin 1 Tablet(s) Oral daily  nystatin Cream 1 Application(s) Topical <User Schedule>  oxyCODONE    IR 30 milliGRAM(s) Oral once  senna 2 Tablet(s) Oral at bedtime  zinc sulfate 220 milliGRAM(s) Oral daily    MEDICATIONS  (PRN):  baclofen 20 milliGRAM(s) Oral two times a day PRN Muscle Spasm  lidocaine   Patch 1 Patch Transdermal daily PRN pain  ondansetron Injectable 4 milliGRAM(s) IV Push every 6 hours PRN Nausea    PHYSICAL EXAM:  HEAD:  Atraumatic, Normocephalic  EYES: EOMI, PERRLA, conjunctiva and sclera clear  ENT: moist mucous membranes  NECK: Supple, No JVD, Normal thyroid  NERVOUS SYSTEM:  Alert & Oriented X3, Good concentration  CHEST/LUNG: No rales, rhonchi, wheezing, or rubs  HEART: Regular rate and rhythm; No murmurs, rubs, or gallops  ABDOMEN: Soft, Nontender, Nondistended; Bowel sounds present  EXTREMITIES: multiple deep decubiti stage 4  SKIN: multiple decubiti stage 4 sacral and b/l LE      LABS:                        10.3   4.0   )-----------( 239      ( 22 Oct 2018 06:10 )             33.0     10-22    135  |  101  |  14  ----------------------------<  83  4.0   |  28  |  0.49<L>    Ca    9.1      22 Oct 2018 06:10  Phos  4.4     10-22  Mg     1.9     10-22    TPro  8.8<H>  /  Alb  2.8<L>  /  TBili  0.1<L>  /  DBili  x   /  AST  15  /  ALT  16  /  AlkPhos  78  10-22
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
## 2018-10-24 DIAGNOSIS — L73.9 FOLLICULAR DISORDER, UNSPECIFIED: ICD-10-CM

## 2018-10-24 PROCEDURE — 99231 SBSQ HOSP IP/OBS SF/LOW 25: CPT | Mod: GC

## 2018-10-24 RX ADMIN — GABAPENTIN 200 MILLIGRAM(S): 400 CAPSULE ORAL at 05:53

## 2018-10-24 RX ADMIN — Medication 100 MILLIGRAM(S): at 05:53

## 2018-10-24 RX ADMIN — OXYCODONE HYDROCHLORIDE 15 MILLIGRAM(S): 5 TABLET ORAL at 18:46

## 2018-10-24 RX ADMIN — ENOXAPARIN SODIUM 40 MILLIGRAM(S): 100 INJECTION SUBCUTANEOUS at 10:12

## 2018-10-24 RX ADMIN — BUPROPION HYDROCHLORIDE 150 MILLIGRAM(S): 150 TABLET, EXTENDED RELEASE ORAL at 12:32

## 2018-10-24 RX ADMIN — Medication 1 MILLIGRAM(S): at 12:32

## 2018-10-24 RX ADMIN — OXYCODONE HYDROCHLORIDE 15 MILLIGRAM(S): 5 TABLET ORAL at 05:54

## 2018-10-24 RX ADMIN — OXYCODONE HYDROCHLORIDE 15 MILLIGRAM(S): 5 TABLET ORAL at 00:28

## 2018-10-24 RX ADMIN — OXYCODONE HYDROCHLORIDE 15 MILLIGRAM(S): 5 TABLET ORAL at 19:20

## 2018-10-24 RX ADMIN — OXYCODONE HYDROCHLORIDE 15 MILLIGRAM(S): 5 TABLET ORAL at 13:42

## 2018-10-24 RX ADMIN — GABAPENTIN 200 MILLIGRAM(S): 400 CAPSULE ORAL at 17:49

## 2018-10-24 RX ADMIN — OXYCODONE HYDROCHLORIDE 15 MILLIGRAM(S): 5 TABLET ORAL at 06:55

## 2018-10-24 RX ADMIN — Medication 100 MILLIGRAM(S): at 17:49

## 2018-10-24 RX ADMIN — Medication 1 TABLET(S): at 12:32

## 2018-10-24 RX ADMIN — ZINC SULFATE TAB 220 MG (50 MG ZINC EQUIVALENT) 220 MILLIGRAM(S): 220 (50 ZN) TAB at 12:32

## 2018-10-24 RX ADMIN — OXYCODONE HYDROCHLORIDE 15 MILLIGRAM(S): 5 TABLET ORAL at 12:32

## 2018-10-24 NOTE — PROGRESS NOTE ADULT - SUBJECTIVE AND OBJECTIVE BOX
Patient is a 28y old  Male who presents with a chief complaint of Pain of sacral and heel wounds (23 Oct 2018 19:22)    Patient was started on doxy for suspected back folliculitis yesterday, no complaints, DC planning.    INTERVAL HPI/OVERNIGHT EVENTS: refused labs this morning    23 Oct 2018 07:01  -  24 Oct 2018 07:00  --------------------------------------------------------  IN: 0 mL / OUT: 800 mL / NET: -800 mL    MEDICATIONS  (STANDING):  buPROPion XL . 150 milliGRAM(s) Oral daily  doxycycline hyclate Capsule 100 milliGRAM(s) Oral every 12 hours  enoxaparin Injectable 40 milliGRAM(s) SubCutaneous every 24 hours  folic acid 1 milliGRAM(s) Oral daily  gabapentin 200 milliGRAM(s) Oral every 12 hours  multivitamin 1 Tablet(s) Oral daily  nystatin Cream 1 Application(s) Topical <User Schedule>  senna 2 Tablet(s) Oral at bedtime  zinc sulfate 220 milliGRAM(s) Oral daily    MEDICATIONS  (PRN):  acetaminophen   Tablet .. 650 milliGRAM(s) Oral every 6 hours PRN Moderate Pain (4 - 6)  baclofen 20 milliGRAM(s) Oral two times a day PRN Muscle Spasm  lidocaine   Patch 1 Patch Transdermal daily PRN pain  ondansetron Injectable 4 milliGRAM(s) IV Push every 6 hours PRN Nausea  oxyCODONE    IR 15 milliGRAM(s) Oral every 6 hours PRN Severe Pain (7 - 10)    PHYSICAL EXAM:  HEAD:  Atraumatic, Normocephalic  EYES: EOMI, PERRLA, conjunctiva and sclera clear  ENT: moist mucous membranes  NECK: Supple, No JVD, Normal thyroid  NERVOUS SYSTEM:  Alert & Oriented X3, Good concentration  CHEST/LUNG: No rales, rhonchi, wheezing, or rubs  HEART: Regular rate and rhythm; No murmurs, rubs, or gallops  ABDOMEN: Soft, Nontender, Nondistended; Bowel sounds present  EXTREMITIES: multiple deep decubiti stage 4  SKIN: multiple decubiti stage 4 sacral and b/l LE; folliculitis over Right upper back
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
## 2018-10-25 PROCEDURE — 99231 SBSQ HOSP IP/OBS SF/LOW 25: CPT | Mod: GC

## 2018-10-25 RX ADMIN — OXYCODONE HYDROCHLORIDE 15 MILLIGRAM(S): 5 TABLET ORAL at 01:35

## 2018-10-25 RX ADMIN — Medication 100 MILLIGRAM(S): at 05:39

## 2018-10-25 RX ADMIN — Medication 1 TABLET(S): at 11:13

## 2018-10-25 RX ADMIN — Medication 650 MILLIGRAM(S): at 11:15

## 2018-10-25 RX ADMIN — ZINC SULFATE TAB 220 MG (50 MG ZINC EQUIVALENT) 220 MILLIGRAM(S): 220 (50 ZN) TAB at 11:13

## 2018-10-25 RX ADMIN — OXYCODONE HYDROCHLORIDE 15 MILLIGRAM(S): 5 TABLET ORAL at 20:04

## 2018-10-25 RX ADMIN — OXYCODONE HYDROCHLORIDE 15 MILLIGRAM(S): 5 TABLET ORAL at 02:36

## 2018-10-25 RX ADMIN — OXYCODONE HYDROCHLORIDE 15 MILLIGRAM(S): 5 TABLET ORAL at 09:14

## 2018-10-25 RX ADMIN — OXYCODONE HYDROCHLORIDE 15 MILLIGRAM(S): 5 TABLET ORAL at 14:16

## 2018-10-25 RX ADMIN — Medication 20 MILLIGRAM(S): at 11:14

## 2018-10-25 RX ADMIN — OXYCODONE HYDROCHLORIDE 15 MILLIGRAM(S): 5 TABLET ORAL at 13:16

## 2018-10-25 RX ADMIN — Medication 650 MILLIGRAM(S): at 12:15

## 2018-10-25 RX ADMIN — BUPROPION HYDROCHLORIDE 150 MILLIGRAM(S): 150 TABLET, EXTENDED RELEASE ORAL at 11:13

## 2018-10-25 RX ADMIN — OXYCODONE HYDROCHLORIDE 15 MILLIGRAM(S): 5 TABLET ORAL at 08:14

## 2018-10-25 RX ADMIN — GABAPENTIN 200 MILLIGRAM(S): 400 CAPSULE ORAL at 17:06

## 2018-10-25 RX ADMIN — Medication 1 MILLIGRAM(S): at 11:13

## 2018-10-25 RX ADMIN — OXYCODONE HYDROCHLORIDE 15 MILLIGRAM(S): 5 TABLET ORAL at 19:34

## 2018-10-25 RX ADMIN — GABAPENTIN 200 MILLIGRAM(S): 400 CAPSULE ORAL at 05:39

## 2018-10-25 RX ADMIN — Medication 100 MILLIGRAM(S): at 17:06

## 2018-10-25 NOTE — PROGRESS NOTE ADULT - SUBJECTIVE AND OBJECTIVE BOX
Patient is a 28y old  Male who presents with a chief complaint of Pain of sacral and heel wounds (24 Oct 2018 15:03)    No complaints today, DC planning.    INTERVAL HPI/OVERNIGHT EVENTS:  T(C): 37.3 (10-24-18 @ 14:28), Max: 37.3 (10-24-18 @ 14:28)  HR: 84 (10-24-18 @ 14:28) (84 - 84)  BP: 124/62 (10-24-18 @ 14:28) (124/62 - 124/62)  RR: 18 (10-24-18 @ 14:28) (18 - 18)  SpO2: 100% (10-24-18 @ 14:28) (100% - 100%)  Wt(kg): --  I&O's Summary    24 Oct 2018 07:01  -  25 Oct 2018 07:00  --------------------------------------------------------  IN: 0 mL / OUT: 1400 mL / NET: -1400 mL    MEDICATIONS  (STANDING):  buPROPion XL . 150 milliGRAM(s) Oral daily  doxycycline hyclate Capsule 100 milliGRAM(s) Oral every 12 hours  enoxaparin Injectable 40 milliGRAM(s) SubCutaneous every 24 hours  folic acid 1 milliGRAM(s) Oral daily  gabapentin 200 milliGRAM(s) Oral every 12 hours  multivitamin 1 Tablet(s) Oral daily  nystatin Cream 1 Application(s) Topical <User Schedule>  senna 2 Tablet(s) Oral at bedtime  zinc sulfate 220 milliGRAM(s) Oral daily    MEDICATIONS  (PRN):  acetaminophen   Tablet .. 650 milliGRAM(s) Oral every 6 hours PRN Moderate Pain (4 - 6)  baclofen 20 milliGRAM(s) Oral two times a day PRN Muscle Spasm  ondansetron Injectable 4 milliGRAM(s) IV Push every 6 hours PRN Nausea  oxyCODONE    IR 15 milliGRAM(s) Oral every 6 hours PRN Severe Pain (7 - 10)    PHYSICAL EXAM:  HEAD:  Atraumatic, Normocephalic  EYES: EOMI, PERRLA, conjunctiva and sclera clear  ENT: moist mucous membranes  NECK: Supple, No JVD, Normal thyroid  NERVOUS SYSTEM:  Alert & Oriented X3, Good concentration  CHEST/LUNG: No rales, rhonchi, wheezing, or rubs  HEART: Regular rate and rhythm; No murmurs, rubs, or gallops  ABDOMEN: Soft, Nontender, Nondistended; Bowel sounds present  EXTREMITIES: multiple deep decubiti stage 4  SKIN: multiple decubiti stage 4 sacral and b/l LE; folliculitis over Right upper back
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
## 2018-10-26 PROCEDURE — 99232 SBSQ HOSP IP/OBS MODERATE 35: CPT | Mod: GC

## 2018-10-26 RX ORDER — OXYCODONE HYDROCHLORIDE 5 MG/1
30 TABLET ORAL ONCE
Qty: 0 | Refills: 0 | Status: DISCONTINUED | OUTPATIENT
Start: 2018-10-26 | End: 2018-10-26

## 2018-10-26 RX ORDER — CAPSAICIN 0.025 %
1 CREAM (GRAM) TOPICAL THREE TIMES A DAY
Qty: 0 | Refills: 0 | Status: DISCONTINUED | OUTPATIENT
Start: 2018-10-26 | End: 2018-11-01

## 2018-10-26 RX ORDER — CLOTRIMAZOLE AND BETAMETHASONE DIPROPIONATE 10; .5 MG/G; MG/G
1 CREAM TOPICAL
Qty: 1 | Refills: 0 | OUTPATIENT
Start: 2018-10-26 | End: 2018-11-08

## 2018-10-26 RX ORDER — CELECOXIB 200 MG/1
200 CAPSULE ORAL DAILY
Qty: 0 | Refills: 0 | Status: DISCONTINUED | OUTPATIENT
Start: 2018-10-26 | End: 2018-11-01

## 2018-10-26 RX ORDER — ACETAMINOPHEN 500 MG
2 TABLET ORAL
Qty: 0 | Refills: 0 | COMMUNITY
Start: 2018-10-26

## 2018-10-26 RX ORDER — GABAPENTIN 400 MG/1
300 CAPSULE ORAL EVERY 8 HOURS
Qty: 0 | Refills: 0 | Status: DISCONTINUED | OUTPATIENT
Start: 2018-10-26 | End: 2018-11-01

## 2018-10-26 RX ADMIN — OXYCODONE HYDROCHLORIDE 15 MILLIGRAM(S): 5 TABLET ORAL at 01:57

## 2018-10-26 RX ADMIN — Medication 1 TABLET(S): at 11:31

## 2018-10-26 RX ADMIN — OXYCODONE HYDROCHLORIDE 15 MILLIGRAM(S): 5 TABLET ORAL at 08:34

## 2018-10-26 RX ADMIN — OXYCODONE HYDROCHLORIDE 15 MILLIGRAM(S): 5 TABLET ORAL at 15:57

## 2018-10-26 RX ADMIN — GABAPENTIN 200 MILLIGRAM(S): 400 CAPSULE ORAL at 05:40

## 2018-10-26 RX ADMIN — Medication 1 MILLIGRAM(S): at 11:31

## 2018-10-26 RX ADMIN — OXYCODONE HYDROCHLORIDE 15 MILLIGRAM(S): 5 TABLET ORAL at 22:19

## 2018-10-26 RX ADMIN — Medication 1 APPLICATION(S): at 21:50

## 2018-10-26 RX ADMIN — GABAPENTIN 300 MILLIGRAM(S): 400 CAPSULE ORAL at 15:54

## 2018-10-26 RX ADMIN — BUPROPION HYDROCHLORIDE 150 MILLIGRAM(S): 150 TABLET, EXTENDED RELEASE ORAL at 11:31

## 2018-10-26 RX ADMIN — OXYCODONE HYDROCHLORIDE 15 MILLIGRAM(S): 5 TABLET ORAL at 07:34

## 2018-10-26 RX ADMIN — CELECOXIB 200 MILLIGRAM(S): 200 CAPSULE ORAL at 18:09

## 2018-10-26 RX ADMIN — NYSTATIN CREAM 1 APPLICATION(S): 100000 CREAM TOPICAL at 07:35

## 2018-10-26 RX ADMIN — OXYCODONE HYDROCHLORIDE 30 MILLIGRAM(S): 5 TABLET ORAL at 12:31

## 2018-10-26 RX ADMIN — CELECOXIB 200 MILLIGRAM(S): 200 CAPSULE ORAL at 17:09

## 2018-10-26 RX ADMIN — ZINC SULFATE TAB 220 MG (50 MG ZINC EQUIVALENT) 220 MILLIGRAM(S): 220 (50 ZN) TAB at 11:31

## 2018-10-26 RX ADMIN — OXYCODONE HYDROCHLORIDE 15 MILLIGRAM(S): 5 TABLET ORAL at 16:57

## 2018-10-26 RX ADMIN — OXYCODONE HYDROCHLORIDE 15 MILLIGRAM(S): 5 TABLET ORAL at 01:27

## 2018-10-26 RX ADMIN — OXYCODONE HYDROCHLORIDE 15 MILLIGRAM(S): 5 TABLET ORAL at 21:49

## 2018-10-26 RX ADMIN — GABAPENTIN 300 MILLIGRAM(S): 400 CAPSULE ORAL at 21:49

## 2018-10-26 RX ADMIN — OXYCODONE HYDROCHLORIDE 30 MILLIGRAM(S): 5 TABLET ORAL at 11:31

## 2018-10-26 RX ADMIN — Medication 100 MILLIGRAM(S): at 17:08

## 2018-10-26 RX ADMIN — Medication 100 MILLIGRAM(S): at 05:40
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
## 2018-10-26 NOTE — PROGRESS NOTE ADULT - SUBJECTIVE AND OBJECTIVE BOX
Patient is a 28y old  Male who presents with a chief complaint of Pain of sacral and heel wounds (25 Oct 2018 10:36)    Patient was seen, pending DC home with home care likely vs NH. No complaints.    INTERVAL HPI/OVERNIGHT EVENTS:  T(C): 36.7 (10-26-18 @ 05:14), Max: 36.7 (10-26-18 @ 05:14)  HR: 74 (10-26-18 @ 05:14) (74 - 90)  BP: 110/72 (10-26-18 @ 05:14) (86/39 - 126/48)  RR: 17 (10-26-18 @ 05:14) (17 - 18)  SpO2: 100% (10-26-18 @ 05:14) (100% - 100%)  Wt(kg): --  I&O's Summary    25 Oct 2018 07:01  -  26 Oct 2018 07:00  --------------------------------------------------------  IN: 0 mL / OUT: 700 mL / NET: -700 mL    MEDICATIONS  (STANDING):  buPROPion XL . 150 milliGRAM(s) Oral daily  doxycycline hyclate Capsule 100 milliGRAM(s) Oral every 12 hours  enoxaparin Injectable 40 milliGRAM(s) SubCutaneous every 24 hours  folic acid 1 milliGRAM(s) Oral daily  gabapentin 200 milliGRAM(s) Oral every 12 hours  multivitamin 1 Tablet(s) Oral daily  nystatin Cream 1 Application(s) Topical <User Schedule>  senna 2 Tablet(s) Oral at bedtime  zinc sulfate 220 milliGRAM(s) Oral daily    MEDICATIONS  (PRN):  acetaminophen   Tablet .. 650 milliGRAM(s) Oral every 6 hours PRN Moderate Pain (4 - 6)  baclofen 20 milliGRAM(s) Oral two times a day PRN Muscle Spasm  ondansetron Injectable 4 milliGRAM(s) IV Push every 6 hours PRN Nausea  oxyCODONE    IR 15 milliGRAM(s) Oral every 6 hours PRN Severe Pain (7 - 10)    PHYSICAL EXAM:  HEAD:  Atraumatic, Normocephalic  EYES: EOMI, PERRLA, conjunctiva and sclera clear  ENT: moist mucous membranes  NECK: Supple, No JVD, Normal thyroid  NERVOUS SYSTEM:  Alert & Oriented X3, Good concentration  CHEST/LUNG: No rales, rhonchi, wheezing, or rubs  HEART: Regular rate and rhythm; No murmurs, rubs, or gallops  ABDOMEN: Soft, Nontender, Nondistended; Bowel sounds present  EXTREMITIES: multiple deep decubiti stage 4  SKIN: multiple decubiti stage 4 sacral and b/l LE; folliculitis over Right upper back, improving

## 2018-10-26 NOTE — PROGRESS NOTE ADULT - SUBJECTIVE AND OBJECTIVE BOX
NP Note discussed with  Primary Attending    Patient is a 28y old  Male who presents with a chief complaint of Pain of sacral and heel wounds (26 Oct 2018 10:42).  Pt now complaining of left shoulder pain, burning sensation. Pt has a dry rash ? ?folliculitis reaction to lidocaine patch.  No drainage no erythema.  No chest pain or sob.  + bilateral lower extremity wounds      INTERVAL HPI/OVERNIGHT EVENTS: no new complaints    MEDICATIONS  (STANDING):  buPROPion XL . 150 milliGRAM(s) Oral daily  doxycycline hyclate Capsule 100 milliGRAM(s) Oral every 12 hours  enoxaparin Injectable 40 milliGRAM(s) SubCutaneous every 24 hours  folic acid 1 milliGRAM(s) Oral daily  gabapentin 200 milliGRAM(s) Oral every 12 hours  multivitamin 1 Tablet(s) Oral daily  nystatin Cream 1 Application(s) Topical <User Schedule>  senna 2 Tablet(s) Oral at bedtime  zinc sulfate 220 milliGRAM(s) Oral daily    MEDICATIONS  (PRN):  acetaminophen   Tablet .. 650 milliGRAM(s) Oral every 6 hours PRN Moderate Pain (4 - 6)  baclofen 20 milliGRAM(s) Oral two times a day PRN Muscle Spasm  ondansetron Injectable 4 milliGRAM(s) IV Push every 6 hours PRN Nausea  oxyCODONE    IR 15 milliGRAM(s) Oral every 6 hours PRN Severe Pain (7 - 10)      __________________________________________________  REVIEW OF SYSTEMS:    CONSTITUTIONAL: No fever,   RESPIRATORY: No cough; No shortness of breath  CARDIOVASCULAR: No chest pain, no palpitations  GASTROINTESTINAL: No pain. No nausea or vomiting; No diarrhea   NEUROLOGICAL: No headache or numbness, no tremors  MUSCULOSKELETAL: + bilateral  lower extremity pain due wounds   GENITOURINARY: + wilson        Vital Signs Last 24 Hrs  T(C): 36.7 (26 Oct 2018 05:14), Max: 36.7 (26 Oct 2018 05:14)  T(F): 98 (26 Oct 2018 05:14), Max: 98 (26 Oct 2018 05:14)  HR: 74 (26 Oct 2018 05:14) (74 - 81)  BP: 110/72 (26 Oct 2018 05:14) (110/72 - 126/48)  BP(mean): --  RR: 17 (26 Oct 2018 05:14) (17 - 17)  SpO2: 100% (26 Oct 2018 05:14) (100% - 100%)    ________________________________________________  PHYSICAL EXAM:  GENERAL: NAD  CHEST/LUNG: Clear to auscultation bilaterally with good air entry   HEART: S1 S2  regular; no murmurs, gallops or rubs  ABDOMEN: Soft, Nontender, Nondistended; Bowel sounds present  EXTREMITIES: no cyanosis; no edema; no calf tenderness  NERVOUS SYSTEM:  Awake and alert  MUSCULOSKELETAL:  dressings - cdi, + dry skin bilateral foot + left shoulder - dry rash, no drainage + paraplegic  _________________________________________________  LABS:              CAPILLARY BLOOD GLUCOSE            RADIOLOGY & ADDITIONAL TESTS:    Imaging Personally Reviewed:  YES/NO    Consultant(s) Notes Reviewed:   YES/ No    Care Discussed with Consultants :     Plan of care was discussed with patient and /or primary care giver; all questions and concerns were addressed and care was aligned with patient's wishes.

## 2018-10-26 NOTE — PROGRESS NOTE ADULT - PROBLEM SELECTOR PLAN 1
due to  paraplegia, chronic wounds/decubiti ulcers   - oxycodone 15mg q 6 hrs   - baclofen 20 mg prn for spasms    - tylenol 650 mg prn   - c/w gabapentin   - lidocaine patch   - turn and position  - capsaicin topical cream 3x a day prn  - wound care   - pending discharge to NH facility

## 2018-10-27 PROCEDURE — 99231 SBSQ HOSP IP/OBS SF/LOW 25: CPT

## 2018-10-27 RX ORDER — LANOLIN ALCOHOL/MO/W.PET/CERES
3 CREAM (GRAM) TOPICAL ONCE
Qty: 0 | Refills: 0 | Status: COMPLETED | OUTPATIENT
Start: 2018-10-27 | End: 2018-10-27

## 2018-10-27 RX ADMIN — Medication 1 APPLICATION(S): at 13:15

## 2018-10-27 RX ADMIN — Medication 100 MILLIGRAM(S): at 05:37

## 2018-10-27 RX ADMIN — ZINC SULFATE TAB 220 MG (50 MG ZINC EQUIVALENT) 220 MILLIGRAM(S): 220 (50 ZN) TAB at 13:14

## 2018-10-27 RX ADMIN — OXYCODONE HYDROCHLORIDE 15 MILLIGRAM(S): 5 TABLET ORAL at 13:11

## 2018-10-27 RX ADMIN — OXYCODONE HYDROCHLORIDE 15 MILLIGRAM(S): 5 TABLET ORAL at 04:10

## 2018-10-27 RX ADMIN — CELECOXIB 200 MILLIGRAM(S): 200 CAPSULE ORAL at 13:14

## 2018-10-27 RX ADMIN — Medication 1 APPLICATION(S): at 05:37

## 2018-10-27 RX ADMIN — OXYCODONE HYDROCHLORIDE 15 MILLIGRAM(S): 5 TABLET ORAL at 21:10

## 2018-10-27 RX ADMIN — Medication 20 MILLIGRAM(S): at 13:11

## 2018-10-27 RX ADMIN — GABAPENTIN 300 MILLIGRAM(S): 400 CAPSULE ORAL at 13:11

## 2018-10-27 RX ADMIN — Medication 1 MILLIGRAM(S): at 13:14

## 2018-10-27 RX ADMIN — GABAPENTIN 300 MILLIGRAM(S): 400 CAPSULE ORAL at 05:37

## 2018-10-27 RX ADMIN — OXYCODONE HYDROCHLORIDE 15 MILLIGRAM(S): 5 TABLET ORAL at 20:09

## 2018-10-27 RX ADMIN — Medication 100 MILLIGRAM(S): at 17:33

## 2018-10-27 RX ADMIN — CELECOXIB 200 MILLIGRAM(S): 200 CAPSULE ORAL at 14:00

## 2018-10-27 RX ADMIN — OXYCODONE HYDROCHLORIDE 15 MILLIGRAM(S): 5 TABLET ORAL at 04:40

## 2018-10-27 RX ADMIN — BUPROPION HYDROCHLORIDE 150 MILLIGRAM(S): 150 TABLET, EXTENDED RELEASE ORAL at 13:11

## 2018-10-27 RX ADMIN — NYSTATIN CREAM 1 APPLICATION(S): 100000 CREAM TOPICAL at 13:15

## 2018-10-27 RX ADMIN — Medication 20 MILLIGRAM(S): at 13:14

## 2018-10-27 RX ADMIN — GABAPENTIN 300 MILLIGRAM(S): 400 CAPSULE ORAL at 21:36

## 2018-10-27 RX ADMIN — Medication 3 MILLIGRAM(S): at 02:45

## 2018-10-27 RX ADMIN — OXYCODONE HYDROCHLORIDE 15 MILLIGRAM(S): 5 TABLET ORAL at 14:00

## 2018-10-27 RX ADMIN — Medication 1 TABLET(S): at 13:14

## 2018-10-27 RX ADMIN — Medication 1 APPLICATION(S): at 21:37

## 2018-10-27 NOTE — PROGRESS NOTE ADULT - SKIN COMMENTS
folliculitis is dramatically better and almost all of his left upper back lesions have dried up and no longer pustular
papular and pustular rash over whole back but mostly over the left upper back and some lesions extend to his left arm, no vesicles
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
## 2018-10-27 NOTE — PROGRESS NOTE ADULT - SUBJECTIVE AND OBJECTIVE BOX
28y Male who still c/o pain over his left shoulder region and  left side of his body. He has no fevers and his rash over his back is dramatically better.    Meds:  doxycycline hyclate Capsule 100 milliGRAM(s) Oral every 12 hours    Allergies    No Known Allergies    Intolerances        VITALS:  Vital Signs Last 24 Hrs  T(C): 36.7 (27 Oct 2018 05:39), Max: 37 (26 Oct 2018 14:10)  T(F): 98.1 (27 Oct 2018 05:39), Max: 98.6 (26 Oct 2018 14:10)  HR: 72 (27 Oct 2018 05:39) (72 - 76)  BP: 101/57 (27 Oct 2018 05:39) (101/57 - 104/54)  BP(mean): --  RR: 18 (27 Oct 2018 05:39) (18 - 18)  SpO2: 100% (27 Oct 2018 05:39) (100% - 100%)    LABS/DIAGNOSTIC TESTS:                      CULTURES:           RADIOLOGY:      ROS:  [  ] UNABLE TO ELICIT

## 2018-10-28 PROCEDURE — 99232 SBSQ HOSP IP/OBS MODERATE 35: CPT

## 2018-10-28 RX ORDER — OXYCODONE HYDROCHLORIDE 5 MG/1
15 TABLET ORAL ONCE
Qty: 0 | Refills: 0 | Status: DISCONTINUED | OUTPATIENT
Start: 2018-10-28 | End: 2018-10-28

## 2018-10-28 RX ADMIN — OXYCODONE HYDROCHLORIDE 15 MILLIGRAM(S): 5 TABLET ORAL at 17:21

## 2018-10-28 RX ADMIN — Medication 1 APPLICATION(S): at 05:26

## 2018-10-28 RX ADMIN — GABAPENTIN 300 MILLIGRAM(S): 400 CAPSULE ORAL at 15:34

## 2018-10-28 RX ADMIN — OXYCODONE HYDROCHLORIDE 15 MILLIGRAM(S): 5 TABLET ORAL at 23:23

## 2018-10-28 RX ADMIN — Medication 1 MILLIGRAM(S): at 11:24

## 2018-10-28 RX ADMIN — Medication 1 APPLICATION(S): at 21:35

## 2018-10-28 RX ADMIN — OXYCODONE HYDROCHLORIDE 15 MILLIGRAM(S): 5 TABLET ORAL at 05:25

## 2018-10-28 RX ADMIN — OXYCODONE HYDROCHLORIDE 15 MILLIGRAM(S): 5 TABLET ORAL at 06:25

## 2018-10-28 RX ADMIN — Medication 100 MILLIGRAM(S): at 17:24

## 2018-10-28 RX ADMIN — Medication 1 APPLICATION(S): at 15:27

## 2018-10-28 RX ADMIN — OXYCODONE HYDROCHLORIDE 15 MILLIGRAM(S): 5 TABLET ORAL at 23:53

## 2018-10-28 RX ADMIN — Medication 100 MILLIGRAM(S): at 05:25

## 2018-10-28 RX ADMIN — BUPROPION HYDROCHLORIDE 150 MILLIGRAM(S): 150 TABLET, EXTENDED RELEASE ORAL at 11:23

## 2018-10-28 RX ADMIN — GABAPENTIN 300 MILLIGRAM(S): 400 CAPSULE ORAL at 05:25

## 2018-10-28 RX ADMIN — GABAPENTIN 300 MILLIGRAM(S): 400 CAPSULE ORAL at 21:34

## 2018-10-28 RX ADMIN — Medication 1 TABLET(S): at 11:24

## 2018-10-28 RX ADMIN — OXYCODONE HYDROCHLORIDE 15 MILLIGRAM(S): 5 TABLET ORAL at 11:22

## 2018-10-28 RX ADMIN — OXYCODONE HYDROCHLORIDE 15 MILLIGRAM(S): 5 TABLET ORAL at 17:47

## 2018-10-28 RX ADMIN — CELECOXIB 200 MILLIGRAM(S): 200 CAPSULE ORAL at 11:23

## 2018-10-28 RX ADMIN — ZINC SULFATE TAB 220 MG (50 MG ZINC EQUIVALENT) 220 MILLIGRAM(S): 220 (50 ZN) TAB at 11:24

## 2018-10-28 RX ADMIN — CELECOXIB 200 MILLIGRAM(S): 200 CAPSULE ORAL at 15:27

## 2018-10-28 RX ADMIN — NYSTATIN CREAM 1 APPLICATION(S): 100000 CREAM TOPICAL at 10:48

## 2018-10-28 RX ADMIN — OXYCODONE HYDROCHLORIDE 15 MILLIGRAM(S): 5 TABLET ORAL at 17:24

## 2018-10-28 NOTE — PROGRESS NOTE ADULT - ATTENDING COMMENTS
Patient was examined at the bedside and discussed with Dr. Solano.     He is alert and feels better today.  Rash on his back, which had been painful yesterday, is much improved.  He states that the pain is better.   He is drinking ensure plus, but prefers chocolate.  I have asked nutritionist to give chocolate ensure.
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
## 2018-10-28 NOTE — PROGRESS NOTE ADULT - SUBJECTIVE AND OBJECTIVE BOX
Patient is a 28y old  Male who presents with a chief complaint of Pain of sacral and heel wounds    INTERVAL HPI/OVERNIGHT EVENTS:  Patient seen and examined at bedside. Denies chest pain, palpitation, SOB, nausea, vomiting, abdominal pain.    T(C): 36.1 (10-28-18 @ 05:31), Max: 36.1 (10-28-18 @ 05:31)  HR: 65 (10-28-18 @ 05:31) (65 - 65)  BP: 138/74 (10-28-18 @ 05:31) (138/74 - 138/74)  RR: 18 (10-28-18 @ 05:31) (18 - 18)  SpO2: 100% (10-28-18 @ 05:31) (100% - 100%)  Wt(kg): --  I&O's Summary        REVIEW OF SYSTEMS:  No fever,   No cough, SOB  No chest pain, palpitations  No Abd pain, nausea, vomiting, No diarrhea or constipation      PHYSICAL EXAM:    Neuro: AAO x 3  EYES: EOMI, PERRLA,  NECK: Supple, No JVD, Normal thyroid  Resp: CTAB, No crackles, wheezing,   CVS: Regular rate and rhythm; No murmurs, rubs, or gallops  ABD: Soft, Nontender, Nondistended; Bowel sounds present  EXTREMITIES:  2+ Peripheral Pulses, No edema  sacral and ankle ulcer wrapped with no evident of drainage or pus    MEDICATIONS  (STANDING):  buPROPion XL . 150 milliGRAM(s) Oral daily  capsaicin 0.025% Cream 1 Application(s) Topical three times a day  celecoxib 200 milliGRAM(s) Oral daily  doxycycline hyclate Capsule 100 milliGRAM(s) Oral every 12 hours  enoxaparin Injectable 40 milliGRAM(s) SubCutaneous every 24 hours  folic acid 1 milliGRAM(s) Oral daily  gabapentin 300 milliGRAM(s) Oral every 8 hours  multivitamin 1 Tablet(s) Oral daily  nystatin Cream 1 Application(s) Topical <User Schedule>  senna 2 Tablet(s) Oral at bedtime  zinc sulfate 220 milliGRAM(s) Oral daily    MEDICATIONS  (PRN):  acetaminophen   Tablet .. 650 milliGRAM(s) Oral every 6 hours PRN Moderate Pain (4 - 6)  baclofen 20 milliGRAM(s) Oral two times a day PRN Muscle Spasm  ondansetron Injectable 4 milliGRAM(s) IV Push every 6 hours PRN Nausea  oxyCODONE    IR 15 milliGRAM(s) Oral every 6 hours PRN Severe Pain (7 - 10)

## 2018-10-29 LAB
APPEARANCE UR: ABNORMAL
BILIRUB UR-MCNC: NEGATIVE — SIGNIFICANT CHANGE UP
COLOR SPEC: YELLOW — SIGNIFICANT CHANGE UP
DIFF PNL FLD: ABNORMAL
GLUCOSE UR QL: NEGATIVE — SIGNIFICANT CHANGE UP
KETONES UR-MCNC: NEGATIVE — SIGNIFICANT CHANGE UP
LEUKOCYTE ESTERASE UR-ACNC: ABNORMAL
NITRITE UR-MCNC: POSITIVE
PH UR: 7 — SIGNIFICANT CHANGE UP (ref 5–8)
PROT UR-MCNC: NEGATIVE — SIGNIFICANT CHANGE UP
SP GR SPEC: 1 — LOW (ref 1.01–1.02)
UROBILINOGEN FLD QL: NEGATIVE — SIGNIFICANT CHANGE UP

## 2018-10-29 PROCEDURE — 99232 SBSQ HOSP IP/OBS MODERATE 35: CPT | Mod: GC

## 2018-10-29 RX ADMIN — OXYCODONE HYDROCHLORIDE 15 MILLIGRAM(S): 5 TABLET ORAL at 14:07

## 2018-10-29 RX ADMIN — Medication 1 TABLET(S): at 13:04

## 2018-10-29 RX ADMIN — GABAPENTIN 300 MILLIGRAM(S): 400 CAPSULE ORAL at 21:40

## 2018-10-29 RX ADMIN — Medication 1 APPLICATION(S): at 06:33

## 2018-10-29 RX ADMIN — NYSTATIN CREAM 1 APPLICATION(S): 100000 CREAM TOPICAL at 13:04

## 2018-10-29 RX ADMIN — Medication 1 APPLICATION(S): at 21:40

## 2018-10-29 RX ADMIN — BUPROPION HYDROCHLORIDE 150 MILLIGRAM(S): 150 TABLET, EXTENDED RELEASE ORAL at 13:04

## 2018-10-29 RX ADMIN — OXYCODONE HYDROCHLORIDE 15 MILLIGRAM(S): 5 TABLET ORAL at 07:02

## 2018-10-29 RX ADMIN — OXYCODONE HYDROCHLORIDE 15 MILLIGRAM(S): 5 TABLET ORAL at 13:04

## 2018-10-29 RX ADMIN — OXYCODONE HYDROCHLORIDE 15 MILLIGRAM(S): 5 TABLET ORAL at 20:10

## 2018-10-29 RX ADMIN — Medication 1 MILLIGRAM(S): at 13:04

## 2018-10-29 RX ADMIN — OXYCODONE HYDROCHLORIDE 15 MILLIGRAM(S): 5 TABLET ORAL at 19:40

## 2018-10-29 RX ADMIN — Medication 1 APPLICATION(S): at 13:05

## 2018-10-29 RX ADMIN — Medication 100 MILLIGRAM(S): at 06:31

## 2018-10-29 RX ADMIN — GABAPENTIN 300 MILLIGRAM(S): 400 CAPSULE ORAL at 13:04

## 2018-10-29 RX ADMIN — ZINC SULFATE TAB 220 MG (50 MG ZINC EQUIVALENT) 220 MILLIGRAM(S): 220 (50 ZN) TAB at 13:04

## 2018-10-29 RX ADMIN — OXYCODONE HYDROCHLORIDE 15 MILLIGRAM(S): 5 TABLET ORAL at 06:32

## 2018-10-29 RX ADMIN — GABAPENTIN 300 MILLIGRAM(S): 400 CAPSULE ORAL at 06:31

## 2018-10-29 RX ADMIN — CELECOXIB 200 MILLIGRAM(S): 200 CAPSULE ORAL at 13:04

## 2018-10-29 RX ADMIN — Medication 100 MILLIGRAM(S): at 17:50

## 2018-10-29 RX ADMIN — CELECOXIB 200 MILLIGRAM(S): 200 CAPSULE ORAL at 14:07

## 2018-10-29 NOTE — PROGRESS NOTE ADULT - PROBLEM SELECTOR PLAN 7
IMPROVE VTE Individual Risk Assessment          RISK                                                          Points  [  ] Previous VTE                                                3  [  ] Thrombophilia                                             2  [ x ] Lower limb paralysis                                   2        (unable to hold up >15 seconds)    [  ] Current Cancer                                             2         (within 6 months)  [x  ] Immobilization > 24 hrs                              1  [  ] ICU/CCU stay > 24 hours                             1  [  ] Age > 60                                                         1    IMPROVE VTE Score: 3, dvt ppx    DC plan to [facility] uti on 10/6  completed course of abx  reports dysuria on 10/29, will repeat UA

## 2018-10-29 NOTE — PROGRESS NOTE ADULT - SUBJECTIVE AND OBJECTIVE BOX
Patient is a 28y old  Male who presents with a chief complaint of Pain of sacral and heel wounds (28 Oct 2018 10:06)    Reported dysuria overnight, repeat UA today, reports improvement of folliculitis over back, no other complaints.     INTERVAL HPI/OVERNIGHT EVENTS:  T(C): 36.6 (10-29-18 @ 00:39), Max: 37.2 (10-28-18 @ 14:55)  HR: 81 (10-28-18 @ 14:55) (81 - 81)  BP: 102/53 (10-28-18 @ 14:55) (102/53 - 102/53)  RR: 18 (10-28-18 @ 14:55) (18 - 18)  SpO2: 99% (10-28-18 @ 14:55) (99% - 99%)    28 Oct 2018 07:01  -  29 Oct 2018 07:00  --------------------------------------------------------  IN: 0 mL / OUT: 1200 mL / NET: -1200 mL    MEDICATIONS  (STANDING):  buPROPion XL . 150 milliGRAM(s) Oral daily  capsaicin 0.025% Cream 1 Application(s) Topical three times a day  celecoxib 200 milliGRAM(s) Oral daily  doxycycline hyclate Capsule 100 milliGRAM(s) Oral every 12 hours  enoxaparin Injectable 40 milliGRAM(s) SubCutaneous every 24 hours  folic acid 1 milliGRAM(s) Oral daily  gabapentin 300 milliGRAM(s) Oral every 8 hours  multivitamin 1 Tablet(s) Oral daily  nystatin Cream 1 Application(s) Topical <User Schedule>  senna 2 Tablet(s) Oral at bedtime  zinc sulfate 220 milliGRAM(s) Oral daily    MEDICATIONS  (PRN):  acetaminophen   Tablet .. 650 milliGRAM(s) Oral every 6 hours PRN Moderate Pain (4 - 6)  baclofen 20 milliGRAM(s) Oral two times a day PRN Muscle Spasm  ondansetron Injectable 4 milliGRAM(s) IV Push every 6 hours PRN Nausea  oxyCODONE    IR 15 milliGRAM(s) Oral every 6 hours PRN Severe Pain (7 - 10)    PHYSICAL EXAM:  HEAD:  Atraumatic, Normocephalic  EYES: EOMI, PERRLA, conjunctiva and sclera clear  ENT: moist mucous membranes  NECK: Supple, No JVD, Normal thyroid  NERVOUS SYSTEM:  Alert & Oriented X3, Good concentration  CHEST/LUNG: No rales, rhonchi, wheezing, or rubs  HEART: Regular rate and rhythm; No murmurs, rubs, or gallops  ABDOMEN: Soft, Nontender, Nondistended; Bowel sounds present  EXTREMITIES: multiple deep decubiti stage 4  SKIN: multiple decubiti stage 4 sacral and b/l LE; folliculitis over Right upper back, improving

## 2018-10-29 NOTE — PROGRESS NOTE ADULT - SUBJECTIVE AND OBJECTIVE BOX
NP Note   pain management     Patient is a 28y old  Male who presents with a chief complaint of Pain of sacral and heel wounds (29 Oct 2018 09:58)  Pt with spastic paraplegia wheelchair bound,  chronic lower back pain and  chronic multiple wounds to sacrum, buttocks, hips  and heels, co chronic back pain. Currently on Doxycycline for folliculitis to upper back.  Pt seen at bedside, states burning sensation and  itchiness is getting better. Denies any new complaints. Awaiting discharge to facility.       INTERVAL HPI/OVERNIGHT EVENTS: no new complaints    MEDICATIONS  (STANDING):  buPROPion XL . 150 milliGRAM(s) Oral daily  capsaicin 0.025% Cream 1 Application(s) Topical three times a day  celecoxib 200 milliGRAM(s) Oral daily  doxycycline hyclate Capsule 100 milliGRAM(s) Oral every 12 hours  enoxaparin Injectable 40 milliGRAM(s) SubCutaneous every 24 hours  folic acid 1 milliGRAM(s) Oral daily  gabapentin 300 milliGRAM(s) Oral every 8 hours  multivitamin 1 Tablet(s) Oral daily  nystatin Cream 1 Application(s) Topical <User Schedule>  senna 2 Tablet(s) Oral at bedtime  zinc sulfate 220 milliGRAM(s) Oral daily    MEDICATIONS  (PRN):  acetaminophen   Tablet .. 650 milliGRAM(s) Oral every 6 hours PRN Moderate Pain (4 - 6)  baclofen 20 milliGRAM(s) Oral two times a day PRN Muscle Spasm  ondansetron Injectable 4 milliGRAM(s) IV Push every 6 hours PRN Nausea  oxyCODONE    IR 15 milliGRAM(s) Oral every 6 hours PRN Severe Pain (7 - 10)      __________________________________________________  REVIEW OF SYSTEMS:    CONSTITUTIONAL: No fever,   RESPIRATORY: No cough; No shortness of breath  CARDIOVASCULAR: No chest pain,   GASTROINTESTINAL: No pain. No nausea or vomiting; No diarrhea   NEUROLOGICAL: No headache or numbness, no tremors  MUSCULOSKELETAL: chronic back pain   GENITOURINARY: + dysuria,   PSYCHIATRY: +  depression , no anxiety  ALL OTHER  ROS negative        Vital Signs Last 24 Hrs  T(C): 36.6 (29 Oct 2018 00:39), Max: 37.2 (28 Oct 2018 14:55)  T(F): 97.9 (29 Oct 2018 00:39), Max: 98.9 (28 Oct 2018 14:55)  HR: 81 (28 Oct 2018 14:55) (81 - 81)  BP: 102/53 (28 Oct 2018 14:55) (102/53 - 102/53)  BP(mean): --  RR: 18 (28 Oct 2018 14:55) (18 - 18)  SpO2: 99% (28 Oct 2018 14:55) (99% - 99%)    ________________________________________________  PHYSICAL EXAM:  GENERAL: NAD  CHEST/LUNG: Clear to ausculitation   HEART: S1 S2  regular   ABDOMEN: Soft, Nontender, Nondistended; + colostomy   EXTREMITIES: no cyanosis; +  bilateral foot and ankle ulcerations/wounds   SKIN: warm and dry; + pressure ulcers to LE and sacrum   NERVOUS SYSTEM:  Awake and alert; Oriented  to place, person and time ; no new deficits    _________________________________________________  LABS:            Urinalysis Basic - ( 29 Oct 2018 10:27 )    Color: Yellow / Appearance: Slightly Turbid / S.005 / pH: x  Gluc: x / Ketone: Negative  / Bili: Negative / Urobili: Negative   Blood: x / Protein: Negative / Nitrite: Positive   Leuk Esterase: Moderate / RBC: 0-2 /HPF / WBC 26-50 /HPF   Sq Epi: x / Non Sq Epi: Occasional /HPF / Bacteria: TNTC /HPF      CAPILLARY BLOOD GLUCOSE            RADIOLOGY & ADDITIONAL TESTS:    Imaging Personally Reviewed:  YES/NO    Consultant(s) Notes Reviewed:   YES/ No    Care Discussed with Consultants :     Plan of care was discussed with patient and /or primary care giver; all questions and concerns were addressed and care was aligned with patient's wishes.

## 2018-10-29 NOTE — PROGRESS NOTE ADULT - ATTENDING COMMENTS
Patient seen and examined at bedside, feels ok  He is still pending rehab placement awaiting Auth.  physical exam unchanged

## 2018-10-29 NOTE — PROGRESS NOTE ADULT - PROBLEM SELECTOR PLAN 1
due to  paraplegia, chronic wounds/decubiti ulcers   - c/w oxycodone 15mg q 6 hrs  - baclofen 20 mg prn for muscle spasms    - tylenol 650 mg prn   - c/w gabapentin 300mg q 8 hrs   - turn and position  - wound care   - pending discharge to NH facility

## 2018-10-30 PROCEDURE — 99232 SBSQ HOSP IP/OBS MODERATE 35: CPT | Mod: GC

## 2018-10-30 RX ADMIN — OXYCODONE HYDROCHLORIDE 15 MILLIGRAM(S): 5 TABLET ORAL at 21:48

## 2018-10-30 RX ADMIN — Medication 1 APPLICATION(S): at 14:25

## 2018-10-30 RX ADMIN — ZINC SULFATE TAB 220 MG (50 MG ZINC EQUIVALENT) 220 MILLIGRAM(S): 220 (50 ZN) TAB at 11:48

## 2018-10-30 RX ADMIN — Medication 20 MILLIGRAM(S): at 11:49

## 2018-10-30 RX ADMIN — Medication 100 MILLIGRAM(S): at 17:13

## 2018-10-30 RX ADMIN — OXYCODONE HYDROCHLORIDE 15 MILLIGRAM(S): 5 TABLET ORAL at 21:18

## 2018-10-30 RX ADMIN — GABAPENTIN 300 MILLIGRAM(S): 400 CAPSULE ORAL at 14:26

## 2018-10-30 RX ADMIN — OXYCODONE HYDROCHLORIDE 15 MILLIGRAM(S): 5 TABLET ORAL at 01:58

## 2018-10-30 RX ADMIN — OXYCODONE HYDROCHLORIDE 15 MILLIGRAM(S): 5 TABLET ORAL at 15:26

## 2018-10-30 RX ADMIN — CELECOXIB 200 MILLIGRAM(S): 200 CAPSULE ORAL at 12:48

## 2018-10-30 RX ADMIN — OXYCODONE HYDROCHLORIDE 15 MILLIGRAM(S): 5 TABLET ORAL at 09:25

## 2018-10-30 RX ADMIN — GABAPENTIN 300 MILLIGRAM(S): 400 CAPSULE ORAL at 05:33

## 2018-10-30 RX ADMIN — CELECOXIB 200 MILLIGRAM(S): 200 CAPSULE ORAL at 11:48

## 2018-10-30 RX ADMIN — NYSTATIN CREAM 1 APPLICATION(S): 100000 CREAM TOPICAL at 08:26

## 2018-10-30 RX ADMIN — Medication 1 TABLET(S): at 11:48

## 2018-10-30 RX ADMIN — OXYCODONE HYDROCHLORIDE 15 MILLIGRAM(S): 5 TABLET ORAL at 08:25

## 2018-10-30 RX ADMIN — OXYCODONE HYDROCHLORIDE 15 MILLIGRAM(S): 5 TABLET ORAL at 01:28

## 2018-10-30 RX ADMIN — BUPROPION HYDROCHLORIDE 150 MILLIGRAM(S): 150 TABLET, EXTENDED RELEASE ORAL at 11:48

## 2018-10-30 RX ADMIN — GABAPENTIN 300 MILLIGRAM(S): 400 CAPSULE ORAL at 21:18

## 2018-10-30 RX ADMIN — Medication 100 MILLIGRAM(S): at 05:33

## 2018-10-30 RX ADMIN — Medication 1 MILLIGRAM(S): at 11:48

## 2018-10-30 RX ADMIN — Medication 1 APPLICATION(S): at 05:33

## 2018-10-30 RX ADMIN — OXYCODONE HYDROCHLORIDE 15 MILLIGRAM(S): 5 TABLET ORAL at 14:26

## 2018-10-30 NOTE — ADVANCED PRACTICE NURSE CONSULT - RECOMMEDATIONS
-Clean all wounds with normal saline and apply skin prep to the surrounding skin  -Apply Collagenase ointment to the slough areas of the L. Hip wound, apply saline moistened gauze to the wound bed, and cover with a Foam dressing Daily PRN  -Pack the L. Ischial, Sacral, R. Hip, and R. Ischial wounds with Silver Calcium Alginate (Aquacel Ag) and cover with a Foam dressing Q 72hrs PRN  -Encourage the patient to reposition Q 2hrs using wedges or pillows
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
## 2018-10-30 NOTE — PROGRESS NOTE ADULT - ATTENDING COMMENTS
Patient seen and examined at bedside, feels ok except for burning urination and twitches in bilateral LE. He says this always happen in setting of a growing UTI.   physical exam:  Vital Signs Last 24 Hrs  T(C): 36.3 (30 Oct 2018 05:39), Max: 37.2 (29 Oct 2018 20:26)  T(F): 97.4 (30 Oct 2018 05:39), Max: 99 (29 Oct 2018 20:26)  HR: 72 (30 Oct 2018 05:39) (72 - 74)  BP: 93/48 (30 Oct 2018 05:39) (93/48 - 99/48)  BP(mean): 59 (29 Oct 2018 20:26) (59 - 59)  RR: 17 (30 Oct 2018 05:39) (17 - 18)  SpO2: 100% (30 Oct 2018 05:39) (100% - 100%)  HEENT: Neck supple  Heart:J S1 S2 normal  Abdomen: NT, ND  Chest: Clear  LE: bilateral paraplegia with stage 4 decubiti    A/p  1- Paraplegia: podiatry and wound care input appreciated  continue dressing change daily  condom catheter to help in healing.     2- ? UTI: repeat UA and send urine culture.     rest as above.

## 2018-10-30 NOTE — CHART NOTE - NSCHARTNOTEFT_GEN_A_CORE
Assessment:   28yMalePatient is a 28y old  Male who presents with a chief complaint of Pain of sacral and heel wounds (30 Oct 2018 09:52)      Factors impacting intake: [ ] none [ ] nausea  [ ] vomiting [ ] diarrhea [ ] constipation  [ ]chewing problems [ ] swallowing issues  [x ] other: adequate intake of meals. Also drinking the ensure. would like to receive 3/d. colostomy output normal per patient.     Diet Presciption: Diet, Regular:   Supplement Feeding Modality:  Oral  Ensure Enlive Cans or Servings Per Day:  1       Frequency:  Three Times a day (10-03-18 @ 10:17)    Intake: adequate     Current Weight: 88.5kg  % Weight change: 5% weight gain in 5 days     Pertinent Medications: MEDICATIONS  (STANDING):  buPROPion XL . 150 milliGRAM(s) Oral daily  capsaicin 0.025% Cream 1 Application(s) Topical three times a day  celecoxib 200 milliGRAM(s) Oral daily  doxycycline hyclate Capsule 100 milliGRAM(s) Oral every 12 hours  enoxaparin Injectable 40 milliGRAM(s) SubCutaneous every 24 hours  folic acid 1 milliGRAM(s) Oral daily  gabapentin 300 milliGRAM(s) Oral every 8 hours  multivitamin 1 Tablet(s) Oral daily  nystatin Cream 1 Application(s) Topical <User Schedule>  senna 2 Tablet(s) Oral at bedtime  zinc sulfate 220 milliGRAM(s) Oral daily    MEDICATIONS  (PRN):  acetaminophen   Tablet .. 650 milliGRAM(s) Oral every 6 hours PRN Moderate Pain (4 - 6)  baclofen 20 milliGRAM(s) Oral two times a day PRN Muscle Spasm  ondansetron Injectable 4 milliGRAM(s) IV Push every 6 hours PRN Nausea  oxyCODONE    IR 15 milliGRAM(s) Oral every 6 hours PRN Severe Pain (7 - 10)    Pertinent Labs:  10-03 [id number] 5.1 % 10-03 Chol 126 mg/dL LDL 67 mg/dL HDL 50 mg/dL Trig 45 mg/dL     CAPILLARY BLOOD GLUCOSE        Skin: multiple pressure ulcers     Estimated Needs:   [x ] no change since previous assessment  [ ] recalculated:     Previous Nutrition Diagnosis:   [ ] Inadequate Energy Intake [ ]Inadequate Oral Intake [ ] Excessive Energy Intake   [ ] Underweight [ ] Increased Nutrient Needs [ ] Overweight/Obesity   [ ] Altered GI Function [ ] Unintended Weight Loss [ ] Food & Nutrition Related Knowledge Deficit [ x] Malnutrition , severe     Nutrition Diagnosis is [x ] ongoing  [ ] resolved [ ] not applicable     New Nutrition Diagnosis: [ x] not applicable       Interventions:   Recommend  [ ] Change Diet To:  [ ] Nutrition Supplement  [ ] Nutrition Support  [ ] Other: continue with Regular diet and ensure enlive tid, change multivitamin to multivitamin/minerals 1 tab/d and add vitamin C 500mg bid to assist with healing , continue with zinc sulfate as needed     Monitoring and Evaluation:   [ x] PO intake [ x ] Tolerance to diet prescription [ x ] weights [ x ] labs[ x ] follow up per protocol  [ ] other:
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
## 2018-10-30 NOTE — PROGRESS NOTE ADULT - SUBJECTIVE AND OBJECTIVE BOX
Patient is a 28y old  Male who presents with a chief complaint of Pain of sacral and heel wounds (30 Oct 2018 00:14)    Patient was seen, refused interview. No complaints. Awaiting discharge. Chronic UTI.    INTERVAL HPI/OVERNIGHT EVENTS:  T(C): 36.3 (10-30-18 @ 05:39), Max: 37.2 (10-29-18 @ 20:26)  HR: 72 (10-30-18 @ 05:39) (72 - 74)  BP: 93/48 (10-30-18 @ 05:39) (93/48 - 99/48)  RR: 17 (10-30-18 @ 05:39) (17 - 18)  SpO2: 100% (10-30-18 @ 05:39) (100% - 100%)    29 Oct 2018 07:01  -  30 Oct 2018 07:00  --------------------------------------------------------  IN: 0 mL / OUT: 1200 mL / NET: -1200 mL    MEDICATIONS  (STANDING):  buPROPion XL . 150 milliGRAM(s) Oral daily  capsaicin 0.025% Cream 1 Application(s) Topical three times a day  celecoxib 200 milliGRAM(s) Oral daily  doxycycline hyclate Capsule 100 milliGRAM(s) Oral every 12 hours  enoxaparin Injectable 40 milliGRAM(s) SubCutaneous every 24 hours  folic acid 1 milliGRAM(s) Oral daily  gabapentin 300 milliGRAM(s) Oral every 8 hours  multivitamin 1 Tablet(s) Oral daily  nystatin Cream 1 Application(s) Topical <User Schedule>  senna 2 Tablet(s) Oral at bedtime  zinc sulfate 220 milliGRAM(s) Oral daily    MEDICATIONS  (PRN):  acetaminophen   Tablet .. 650 milliGRAM(s) Oral every 6 hours PRN Moderate Pain (4 - 6)  baclofen 20 milliGRAM(s) Oral two times a day PRN Muscle Spasm  ondansetron Injectable 4 milliGRAM(s) IV Push every 6 hours PRN Nausea  oxyCODONE    IR 15 milliGRAM(s) Oral every 6 hours PRN Severe Pain (7 - 10)    PHYSICAL EXAM:  GENERAL: NAD, well-groomed, well-developed  HEAD:  Atraumatic, Normocephalic  EYES: EOMI, PERRLA, conjunctiva and sclera clear  ENMT: No tonsillar erythema, exudates, or enlargement; Moist mucous membranes  NECK: Supple, No JVD, Normal thyroid  NERVOUS SYSTEM:  Alert & Oriented X3, Good concentration; Motor Strength 5/5 B/L upper and lower extremities; DTRs 2+ intact and symmetric  CHEST/LUNG: Clear to percussion bilaterally; No rales, rhonchi, wheezing, or rubs  HEART: Regular rate and rhythm; No murmurs, rubs, or gallops  ABDOMEN: Soft, Nontender, Nondistended; Bowel sounds present  EXTREMITIES:  2+ Peripheral Pulses, No clubbing, cyanosis, or edema  LYMPH: No lymphadenopathy noted  SKIN: + folliculitis over L upper back, improving, multiple sacral and LE ulcerations    Urinalysis Basic - ( 29 Oct 2018 10:27 )    Color: Yellow / Appearance: Slightly Turbid / S.005 / pH: x  Gluc: x / Ketone: Negative  / Bili: Negative / Urobili: Negative   Blood: x / Protein: Negative / Nitrite: Positive   Leuk Esterase: Moderate / RBC: 0-2 /HPF / WBC 26-50 /HPF   Sq Epi: x / Non Sq Epi: Occasional /HPF / Bacteria: TNTC /HPF
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
## 2018-10-31 PROCEDURE — 99239 HOSP IP/OBS DSCHRG MGMT >30: CPT

## 2018-10-31 RX ORDER — OXYCODONE HYDROCHLORIDE 5 MG/1
15 TABLET ORAL ONCE
Qty: 0 | Refills: 0 | Status: DISCONTINUED | OUTPATIENT
Start: 2018-10-31 | End: 2018-10-31

## 2018-10-31 RX ORDER — COLLAGENASE CLOSTRIDIUM HIST. 250 UNIT/G
1 OINTMENT (GRAM) TOPICAL
Qty: 0 | Refills: 0 | COMMUNITY
Start: 2018-10-31

## 2018-10-31 RX ORDER — OXYCODONE HYDROCHLORIDE 5 MG/1
1 TABLET ORAL
Qty: 0 | Refills: 0 | COMMUNITY
Start: 2018-10-31

## 2018-10-31 RX ORDER — CAPSAICIN 0.025 %
1 CREAM (GRAM) TOPICAL
Qty: 1 | Refills: 0
Start: 2018-10-31 | End: 2018-11-29

## 2018-10-31 RX ORDER — OXYCODONE HYDROCHLORIDE 5 MG/1
1 TABLET ORAL
Qty: 12 | Refills: 0
Start: 2018-10-31 | End: 2018-11-02

## 2018-10-31 RX ORDER — BUPROPION HYDROCHLORIDE 150 MG/1
1 TABLET, EXTENDED RELEASE ORAL
Qty: 14 | Refills: 0
Start: 2018-10-31 | End: 2018-11-13

## 2018-10-31 RX ORDER — BACLOFEN 100 %
1 POWDER (GRAM) MISCELLANEOUS
Qty: 14 | Refills: 0
Start: 2018-10-31 | End: 2018-11-06

## 2018-10-31 RX ORDER — ZINC SULFATE TAB 220 MG (50 MG ZINC EQUIVALENT) 220 (50 ZN) MG
1 TAB ORAL
Qty: 7 | Refills: 0
Start: 2018-10-31 | End: 2018-11-06

## 2018-10-31 RX ORDER — CAPSAICIN 0.025 %
1 CREAM (GRAM) TOPICAL
Qty: 0 | Refills: 0 | COMMUNITY
Start: 2018-10-31

## 2018-10-31 RX ORDER — COLLAGENASE CLOSTRIDIUM HIST. 250 UNIT/G
1 OINTMENT (GRAM) TOPICAL DAILY
Qty: 0 | Refills: 0 | Status: DISCONTINUED | OUTPATIENT
Start: 2018-10-31 | End: 2018-11-01

## 2018-10-31 RX ORDER — FOLIC ACID 0.8 MG
1 TABLET ORAL
Qty: 30 | Refills: 0
Start: 2018-10-31 | End: 2018-11-29

## 2018-10-31 RX ORDER — CELECOXIB 200 MG/1
1 CAPSULE ORAL
Qty: 0 | Refills: 0 | COMMUNITY
Start: 2018-10-31

## 2018-10-31 RX ORDER — OXYCODONE HYDROCHLORIDE 5 MG/1
30 TABLET ORAL ONCE
Qty: 0 | Refills: 0 | Status: DISCONTINUED | OUTPATIENT
Start: 2018-10-31 | End: 2018-10-31

## 2018-10-31 RX ORDER — ACETAMINOPHEN 500 MG
2 TABLET ORAL
Qty: 112 | Refills: 0
Start: 2018-10-31 | End: 2018-11-13

## 2018-10-31 RX ORDER — GABAPENTIN 400 MG/1
2 CAPSULE ORAL
Qty: 56 | Refills: 0
Start: 2018-10-31 | End: 2018-11-13

## 2018-10-31 RX ORDER — LIDOCAINE 4 G/100G
1 CREAM TOPICAL
Qty: 7 | Refills: 0
Start: 2018-10-31 | End: 2018-11-06

## 2018-10-31 RX ORDER — COLLAGENASE CLOSTRIDIUM HIST. 250 UNIT/G
1 OINTMENT (GRAM) TOPICAL
Qty: 1 | Refills: 0
Start: 2018-10-31 | End: 2018-11-29

## 2018-10-31 RX ORDER — CELECOXIB 200 MG/1
1 CAPSULE ORAL
Qty: 7 | Refills: 0
Start: 2018-10-31 | End: 2018-11-06

## 2018-10-31 RX ORDER — OXYCODONE HYDROCHLORIDE 5 MG/1
15 TABLET ORAL EVERY 6 HOURS
Qty: 0 | Refills: 0 | Status: DISCONTINUED | OUTPATIENT
Start: 2018-10-31 | End: 2018-11-01

## 2018-10-31 RX ADMIN — OXYCODONE HYDROCHLORIDE 15 MILLIGRAM(S): 5 TABLET ORAL at 17:01

## 2018-10-31 RX ADMIN — CELECOXIB 200 MILLIGRAM(S): 200 CAPSULE ORAL at 11:07

## 2018-10-31 RX ADMIN — OXYCODONE HYDROCHLORIDE 15 MILLIGRAM(S): 5 TABLET ORAL at 23:22

## 2018-10-31 RX ADMIN — Medication 20 MILLIGRAM(S): at 23:22

## 2018-10-31 RX ADMIN — Medication 100 MILLIGRAM(S): at 06:34

## 2018-10-31 RX ADMIN — ZINC SULFATE TAB 220 MG (50 MG ZINC EQUIVALENT) 220 MILLIGRAM(S): 220 (50 ZN) TAB at 11:07

## 2018-10-31 RX ADMIN — Medication 1 APPLICATION(S): at 11:08

## 2018-10-31 RX ADMIN — GABAPENTIN 300 MILLIGRAM(S): 400 CAPSULE ORAL at 06:34

## 2018-10-31 RX ADMIN — GABAPENTIN 300 MILLIGRAM(S): 400 CAPSULE ORAL at 13:05

## 2018-10-31 RX ADMIN — Medication 1 APPLICATION(S): at 21:22

## 2018-10-31 RX ADMIN — OXYCODONE HYDROCHLORIDE 15 MILLIGRAM(S): 5 TABLET ORAL at 18:01

## 2018-10-31 RX ADMIN — Medication 1 TABLET(S): at 11:07

## 2018-10-31 RX ADMIN — OXYCODONE HYDROCHLORIDE 30 MILLIGRAM(S): 5 TABLET ORAL at 10:27

## 2018-10-31 RX ADMIN — GABAPENTIN 300 MILLIGRAM(S): 400 CAPSULE ORAL at 21:20

## 2018-10-31 RX ADMIN — CELECOXIB 200 MILLIGRAM(S): 200 CAPSULE ORAL at 12:07

## 2018-10-31 RX ADMIN — OXYCODONE HYDROCHLORIDE 30 MILLIGRAM(S): 5 TABLET ORAL at 09:27

## 2018-10-31 RX ADMIN — Medication 1 APPLICATION(S): at 13:05

## 2018-10-31 RX ADMIN — Medication 1 MILLIGRAM(S): at 11:07

## 2018-10-31 RX ADMIN — BUPROPION HYDROCHLORIDE 150 MILLIGRAM(S): 150 TABLET, EXTENDED RELEASE ORAL at 11:07
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
## 2018-10-31 NOTE — PROGRESS NOTE ADULT - PROBLEM SELECTOR PLAN 7
VRE uti on 10/6  completed course of abx  reports dysuria on 10/29, repeat UA shows UTI, chronic UTI, will hold off on abx until UCx results

## 2018-10-31 NOTE — PROGRESS NOTE ADULT - SUBJECTIVE AND OBJECTIVE BOX
Patient is a 28y old  Male who presents with a chief complaint of Pain of sacral and heel wounds (30 Oct 2018 18:02)    Patient was seen, reported breakthrough back pain, received stat oxycodone. Pending Ucx results, repeat. No other complaints. DC planning. Refused vitals and IV placement.    MEDICATIONS  (STANDING):  buPROPion XL . 150 milliGRAM(s) Oral daily  capsaicin 0.025% Cream 1 Application(s) Topical three times a day  celecoxib 200 milliGRAM(s) Oral daily  collagenase Ointment 1 Application(s) Topical daily  enoxaparin Injectable 40 milliGRAM(s) SubCutaneous every 24 hours  folic acid 1 milliGRAM(s) Oral daily  gabapentin 300 milliGRAM(s) Oral every 8 hours  multivitamin 1 Tablet(s) Oral daily  nystatin Cream 1 Application(s) Topical <User Schedule>  senna 2 Tablet(s) Oral at bedtime  zinc sulfate 220 milliGRAM(s) Oral daily    MEDICATIONS  (PRN):  acetaminophen   Tablet .. 650 milliGRAM(s) Oral every 6 hours PRN Moderate Pain (4 - 6)  baclofen 20 milliGRAM(s) Oral two times a day PRN Muscle Spasm  ondansetron Injectable 4 milliGRAM(s) IV Push every 6 hours PRN Nausea    PHYSICAL EXAM:  GENERAL: NAD, well-groomed, well-developed  HEAD:  Atraumatic, Normocephalic  EYES: EOMI, PERRLA, conjunctiva and sclera clear  ENMT: No tonsillar erythema, exudates, or enlargement; Moist mucous membranes  NECK: Supple, No JVD, Normal thyroid  NERVOUS SYSTEM:  Alert & Oriented X3, Good concentration; Motor Strength 5/5 B/L upper and lower extremities; DTRs 2+ intact and symmetric  CHEST/LUNG: Clear to percussion bilaterally; No rales, rhonchi, wheezing, or rubs  HEART: Regular rate and rhythm; No murmurs, rubs, or gallops  ABDOMEN: Soft, Nontender, Nondistended; Bowel sounds present  EXTREMITIES:  2+ Peripheral Pulses, No clubbing, cyanosis, or edema  LYMPH: No lymphadenopathy noted  SKIN: + folliculitis over L upper back, improving, multiple sacral and LE ulcerations
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
## 2018-11-01 VITALS
SYSTOLIC BLOOD PRESSURE: 138 MMHG | DIASTOLIC BLOOD PRESSURE: 85 MMHG | OXYGEN SATURATION: 100 % | RESPIRATION RATE: 17 BRPM | TEMPERATURE: 97 F | HEART RATE: 70 BPM

## 2018-11-01 LAB
CULTURE RESULTS: SIGNIFICANT CHANGE UP
SPECIMEN SOURCE: SIGNIFICANT CHANGE UP

## 2018-11-01 PROCEDURE — 83735 ASSAY OF MAGNESIUM: CPT

## 2018-11-01 PROCEDURE — 80053 COMPREHEN METABOLIC PANEL: CPT

## 2018-11-01 PROCEDURE — 80307 DRUG TEST PRSMV CHEM ANLYZR: CPT

## 2018-11-01 PROCEDURE — 83036 HEMOGLOBIN GLYCOSYLATED A1C: CPT

## 2018-11-01 PROCEDURE — 87186 SC STD MICRODIL/AGAR DIL: CPT

## 2018-11-01 PROCEDURE — 85027 COMPLETE CBC AUTOMATED: CPT

## 2018-11-01 PROCEDURE — 84100 ASSAY OF PHOSPHORUS: CPT

## 2018-11-01 PROCEDURE — 80048 BASIC METABOLIC PNL TOTAL CA: CPT

## 2018-11-01 PROCEDURE — 84443 ASSAY THYROID STIM HORMONE: CPT

## 2018-11-01 PROCEDURE — 81001 URINALYSIS AUTO W/SCOPE: CPT

## 2018-11-01 PROCEDURE — 97161 PT EVAL LOW COMPLEX 20 MIN: CPT

## 2018-11-01 PROCEDURE — 80061 LIPID PANEL: CPT

## 2018-11-01 PROCEDURE — 87086 URINE CULTURE/COLONY COUNT: CPT

## 2018-11-01 PROCEDURE — 82607 VITAMIN B-12: CPT

## 2018-11-01 PROCEDURE — 99285 EMERGENCY DEPT VISIT HI MDM: CPT | Mod: 25

## 2018-11-01 PROCEDURE — 97530 THERAPEUTIC ACTIVITIES: CPT

## 2018-11-01 RX ORDER — CEFTRIAXONE 500 MG/1
INJECTION, POWDER, FOR SOLUTION INTRAMUSCULAR; INTRAVENOUS
Qty: 0 | Refills: 0 | Status: DISCONTINUED | OUTPATIENT
Start: 2018-11-01 | End: 2018-11-01

## 2018-11-01 RX ORDER — CEFTRIAXONE 500 MG/1
1 INJECTION, POWDER, FOR SOLUTION INTRAMUSCULAR; INTRAVENOUS ONCE
Qty: 0 | Refills: 0 | Status: COMPLETED | OUTPATIENT
Start: 2018-11-01 | End: 2018-11-01

## 2018-11-01 RX ORDER — LINEZOLID 600 MG/300ML
600 INJECTION, SOLUTION INTRAVENOUS ONCE
Qty: 0 | Refills: 0 | Status: DISCONTINUED | OUTPATIENT
Start: 2018-11-01 | End: 2018-11-01

## 2018-11-01 RX ORDER — CEFTRIAXONE 500 MG/1
1 INJECTION, POWDER, FOR SOLUTION INTRAMUSCULAR; INTRAVENOUS EVERY 24 HOURS
Qty: 0 | Refills: 0 | Status: DISCONTINUED | OUTPATIENT
Start: 2018-11-02 | End: 2018-11-01

## 2018-11-01 RX ADMIN — OXYCODONE HYDROCHLORIDE 15 MILLIGRAM(S): 5 TABLET ORAL at 00:00

## 2018-11-01 RX ADMIN — Medication 1 APPLICATION(S): at 12:23

## 2018-11-01 RX ADMIN — Medication 1 APPLICATION(S): at 05:44

## 2018-11-01 RX ADMIN — Medication 1 APPLICATION(S): at 12:24

## 2018-11-01 RX ADMIN — ENOXAPARIN SODIUM 40 MILLIGRAM(S): 100 INJECTION SUBCUTANEOUS at 06:09

## 2018-11-01 RX ADMIN — OXYCODONE HYDROCHLORIDE 15 MILLIGRAM(S): 5 TABLET ORAL at 13:00

## 2018-11-01 RX ADMIN — Medication 20 MILLIGRAM(S): at 06:15

## 2018-11-01 RX ADMIN — OXYCODONE HYDROCHLORIDE 15 MILLIGRAM(S): 5 TABLET ORAL at 06:15

## 2018-11-01 RX ADMIN — BUPROPION HYDROCHLORIDE 150 MILLIGRAM(S): 150 TABLET, EXTENDED RELEASE ORAL at 12:23

## 2018-11-01 RX ADMIN — CELECOXIB 200 MILLIGRAM(S): 200 CAPSULE ORAL at 14:00

## 2018-11-01 RX ADMIN — OXYCODONE HYDROCHLORIDE 15 MILLIGRAM(S): 5 TABLET ORAL at 12:23

## 2018-11-01 RX ADMIN — Medication 1 TABLET(S): at 12:23

## 2018-11-01 RX ADMIN — GABAPENTIN 300 MILLIGRAM(S): 400 CAPSULE ORAL at 12:24

## 2018-11-01 RX ADMIN — ZINC SULFATE TAB 220 MG (50 MG ZINC EQUIVALENT) 220 MILLIGRAM(S): 220 (50 ZN) TAB at 12:22

## 2018-11-01 RX ADMIN — CELECOXIB 200 MILLIGRAM(S): 200 CAPSULE ORAL at 12:22

## 2018-11-01 RX ADMIN — NYSTATIN CREAM 1 APPLICATION(S): 100000 CREAM TOPICAL at 12:23

## 2018-11-01 RX ADMIN — Medication 1 MILLIGRAM(S): at 12:22

## 2018-11-01 RX ADMIN — GABAPENTIN 300 MILLIGRAM(S): 400 CAPSULE ORAL at 06:09
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
## 2018-11-01 NOTE — PROGRESS NOTE ADULT - PROVIDER SPECIALTY LIST ADULT
Infectious Disease
Infectious Disease
Internal Medicine
Pain Medicine
Podiatry
Internal Medicine
Pain Medicine
Internal Medicine
Pain Medicine
Internal Medicine

## 2018-11-01 NOTE — PROGRESS NOTE ADULT - SUBJECTIVE AND OBJECTIVE BOX
Patient is a 28y old  Male who presents with a chief complaint of Pain of sacral and heel wounds (31 Oct 2018 13:02)    Patient is DC Against Medical Advice today. Refusing interview.    INTERVAL HPI/OVERNIGHT EVENTS:  T(C): 36.9 (11-01-18 @ 05:26), Max: 36.9 (11-01-18 @ 05:26)  HR: 70 (11-01-18 @ 05:26) (70 - 70)  BP: 148/77 (11-01-18 @ 05:26) (148/77 - 148/77)  RR: 16 (11-01-18 @ 05:26) (16 - 16)  SpO2: 100% (11-01-18 @ 05:26) (100% - 100%)  Wt(kg): --  I&O's Summary    31 Oct 2018 07:01  -  01 Nov 2018 07:00  --------------------------------------------------------  IN: 0 mL / OUT: 1050 mL / NET: -1050 mL    MEDICATIONS  (STANDING):  buPROPion XL . 150 milliGRAM(s) Oral daily  capsaicin 0.025% Cream 1 Application(s) Topical three times a day  cefTRIAXone   IVPB      cefTRIAXone   IVPB 1 Gram(s) IV Intermittent once  celecoxib 200 milliGRAM(s) Oral daily  collagenase Ointment 1 Application(s) Topical daily  enoxaparin Injectable 40 milliGRAM(s) SubCutaneous every 24 hours  folic acid 1 milliGRAM(s) Oral daily  gabapentin 300 milliGRAM(s) Oral every 8 hours  multivitamin 1 Tablet(s) Oral daily  nystatin Cream 1 Application(s) Topical <User Schedule>  senna 2 Tablet(s) Oral at bedtime  zinc sulfate 220 milliGRAM(s) Oral daily    MEDICATIONS  (PRN):  acetaminophen   Tablet .. 650 milliGRAM(s) Oral every 6 hours PRN Moderate Pain (4 - 6)  baclofen 20 milliGRAM(s) Oral two times a day PRN Muscle Spasm  ondansetron Injectable 4 milliGRAM(s) IV Push every 6 hours PRN Nausea  oxyCODONE    IR 15 milliGRAM(s) Oral every 6 hours PRN Moderate Pain (4 - 6)    PHYSICAL EXAM:  GENERAL: NAD, well-groomed, well-developed  HEAD:  Atraumatic, Normocephalic  EYES: EOMI, PERRLA, conjunctiva and sclera clear  ENMT: No tonsillar erythema, exudates, or enlargement; Moist mucous membranes  NECK: Supple, No JVD, Normal thyroid  NERVOUS SYSTEM:  Alert & Oriented X3, Good concentration; Motor Strength 5/5 B/L upper and lower extremities; DTRs 2+ intact and symmetric  CHEST/LUNG: Clear to percussion bilaterally; No rales, rhonchi, wheezing, or rubs  HEART: Regular rate and rhythm; No murmurs, rubs, or gallops  ABDOMEN: Soft, Nontender, Nondistended; Bowel sounds present  EXTREMITIES:  2+ Peripheral Pulses, No clubbing, cyanosis, or edema  LYMPH: No lymphadenopathy noted  SKIN: + folliculitis over L upper back, resolving, multiple sacral and LE ulcerations (resolving)

## 2018-11-01 NOTE — CHART NOTE - NSCHARTNOTESELECT_GEN_ALL_CORE
Nutrition Services
Event Note
Hospitalist Follow up Note:
Nutrition Services

## 2018-11-01 NOTE — CHART NOTE - NSCHARTNOTEFT_GEN_A_CORE
Patient stated he wanted to leave AMA because he has an appointment with PCP at 8:30. Convinced the patient to stay for regular discharge. Said will contact PCP when office opens to explain the situation and postpone the appointment.
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
## 2018-11-01 NOTE — PROGRESS NOTE ADULT - PROBLEM SELECTOR PLAN 8
IMPROVE VTE Individual Risk Assessment          RISK                                                          Points  [  ] Previous VTE                                                3  [  ] Thrombophilia                                             2  [ x ] Lower limb paralysis                                   2        (unable to hold up >15 seconds)    [  ] Current Cancer                                             2         (within 6 months)  [x  ] Immobilization > 24 hrs                              1  [  ] ICU/CCU stay > 24 hours                             1  [  ] Age > 60                                                         1    IMPROVE VTE Score: 3, dvt ppx
IMPROVE VTE Individual Risk Assessment          RISK                                                          Points  [  ] Previous VTE                                                3  [  ] Thrombophilia                                             2  [ x ] Lower limb paralysis                                   2        (unable to hold up >15 seconds)    [  ] Current Cancer                                             2         (within 6 months)  [x  ] Immobilization > 24 hrs                              1  [  ] ICU/CCU stay > 24 hours                             1  [  ] Age > 60                                                         1    IMPROVE VTE Score: 3, dvt ppx    DC plan to [facility]
IMPROVE VTE Individual Risk Assessment          RISK                                                          Points  [  ] Previous VTE                                                3  [  ] Thrombophilia                                             2  [ x ] Lower limb paralysis                                   2        (unable to hold up >15 seconds)    [  ] Current Cancer                                             2         (within 6 months)  [x  ] Immobilization > 24 hrs                              1  [  ] ICU/CCU stay > 24 hours                             1  [  ] Age > 60                                                         1    IMPROVE VTE Score: 3, dvt ppx    DC plan to NH
IMPROVE VTE Individual Risk Assessment          RISK                                                          Points  [  ] Previous VTE                                                3  [  ] Thrombophilia                                             2  [ x ] Lower limb paralysis                                   2        (unable to hold up >15 seconds)    [  ] Current Cancer                                             2         (within 6 months)  [x  ] Immobilization > 24 hrs                              1  [  ] ICU/CCU stay > 24 hours                             1  [  ] Age > 60                                                         1    IMPROVE VTE Score: 3, dvt ppx    leaving AMA, refusing placement
IMPROVE VTE Individual Risk Assessment          RISK                                                          Points  [  ] Previous VTE                                                3  [  ] Thrombophilia                                             2  [ x ] Lower limb paralysis                                   2        (unable to hold up >15 seconds)    [  ] Current Cancer                                             2         (within 6 months)  [x  ] Immobilization > 24 hrs                              1  [  ] ICU/CCU stay > 24 hours                             1  [  ] Age > 60                                                         1    IMPROVE VTE Score: 3, dvt ppx    DC plan to NH

## 2022-05-26 PROBLEM — Z00.00 ENCOUNTER FOR PREVENTIVE HEALTH EXAMINATION: Status: ACTIVE | Noted: 2022-05-26
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
## 2024-02-21 NOTE — PROGRESS NOTE ADULT - REASON FOR ADMISSION
Addended by: ALANNA BAUTISTA on: 2/21/2024 02:18 PM     Modules accepted: Orders    
Addended by: ERIC SALGADO on: 2/21/2024 02:58 PM     Modules accepted: Orders    
Pain of sacral and heel wounds

## 2024-07-01 ENCOUNTER — INPATIENT (INPATIENT)
Facility: HOSPITAL | Age: 34
LOS: 3 days | Discharge: NURSING, REHAB OR LONG-TERM CARE | End: 2024-07-05
Attending: INTERNAL MEDICINE | Admitting: INTERNAL MEDICINE
Payer: MEDICAID

## 2024-07-01 VITALS
HEART RATE: 114 BPM | DIASTOLIC BLOOD PRESSURE: 61 MMHG | TEMPERATURE: 99 F | RESPIRATION RATE: 19 BRPM | OXYGEN SATURATION: 97 % | WEIGHT: 284.62 LBS | SYSTOLIC BLOOD PRESSURE: 96 MMHG

## 2024-07-01 DIAGNOSIS — Z93.3 COLOSTOMY STATUS: Chronic | ICD-10-CM

## 2024-07-01 LAB
ALBUMIN SERPL ELPH-MCNC: 4.1 G/DL — SIGNIFICANT CHANGE UP (ref 3.3–5)
ALP SERPL-CCNC: 99 U/L — SIGNIFICANT CHANGE UP (ref 40–120)
ALT FLD-CCNC: 34 U/L — SIGNIFICANT CHANGE UP (ref 12–78)
ANION GAP SERPL CALC-SCNC: 9 MMOL/L — SIGNIFICANT CHANGE UP (ref 5–17)
APPEARANCE UR: ABNORMAL
AST SERPL-CCNC: 36 U/L — SIGNIFICANT CHANGE UP (ref 15–37)
BACTERIA # UR AUTO: ABNORMAL /HPF
BASOPHILS # BLD AUTO: 0.04 K/UL — SIGNIFICANT CHANGE UP (ref 0–0.2)
BASOPHILS NFR BLD AUTO: 0.4 % — SIGNIFICANT CHANGE UP (ref 0–2)
BILIRUB SERPL-MCNC: 0.5 MG/DL — SIGNIFICANT CHANGE UP (ref 0.2–1.2)
BILIRUB UR-MCNC: NEGATIVE — SIGNIFICANT CHANGE UP
BUN SERPL-MCNC: 19 MG/DL — SIGNIFICANT CHANGE UP (ref 7–23)
CALCIUM SERPL-MCNC: 10 MG/DL — SIGNIFICANT CHANGE UP (ref 8.5–10.1)
CHLORIDE SERPL-SCNC: 101 MMOL/L — SIGNIFICANT CHANGE UP (ref 96–108)
CO2 SERPL-SCNC: 23 MMOL/L — SIGNIFICANT CHANGE UP (ref 22–31)
COLOR SPEC: YELLOW — SIGNIFICANT CHANGE UP
CREAT SERPL-MCNC: 1.17 MG/DL — SIGNIFICANT CHANGE UP (ref 0.5–1.3)
DIFF PNL FLD: ABNORMAL
EGFR: 84 ML/MIN/1.73M2 — SIGNIFICANT CHANGE UP
EOSINOPHIL # BLD AUTO: 0.05 K/UL — SIGNIFICANT CHANGE UP (ref 0–0.5)
EOSINOPHIL NFR BLD AUTO: 0.5 % — SIGNIFICANT CHANGE UP (ref 0–6)
EPI CELLS # UR: PRESENT
FLUAV AG NPH QL: SIGNIFICANT CHANGE UP
FLUBV AG NPH QL: SIGNIFICANT CHANGE UP
GLUCOSE SERPL-MCNC: 114 MG/DL — HIGH (ref 70–99)
GLUCOSE UR QL: NEGATIVE MG/DL — SIGNIFICANT CHANGE UP
HCT VFR BLD CALC: 38.9 % — LOW (ref 39–50)
HGB BLD-MCNC: 13 G/DL — SIGNIFICANT CHANGE UP (ref 13–17)
IMM GRANULOCYTES NFR BLD AUTO: 0.2 % — SIGNIFICANT CHANGE UP (ref 0–0.9)
KETONES UR-MCNC: 15 MG/DL
LEUKOCYTE ESTERASE UR-ACNC: ABNORMAL
LYMPHOCYTES # BLD AUTO: 2.09 K/UL — SIGNIFICANT CHANGE UP (ref 1–3.3)
LYMPHOCYTES # BLD AUTO: 22.6 % — SIGNIFICANT CHANGE UP (ref 13–44)
MCHC RBC-ENTMCNC: 27.4 PG — SIGNIFICANT CHANGE UP (ref 27–34)
MCHC RBC-ENTMCNC: 33.4 G/DL — SIGNIFICANT CHANGE UP (ref 32–36)
MCV RBC AUTO: 82.1 FL — SIGNIFICANT CHANGE UP (ref 80–100)
MONOCYTES # BLD AUTO: 0.68 K/UL — SIGNIFICANT CHANGE UP (ref 0–0.9)
MONOCYTES NFR BLD AUTO: 7.3 % — SIGNIFICANT CHANGE UP (ref 2–14)
NEUTROPHILS # BLD AUTO: 6.38 K/UL — SIGNIFICANT CHANGE UP (ref 1.8–7.4)
NEUTROPHILS NFR BLD AUTO: 69 % — SIGNIFICANT CHANGE UP (ref 43–77)
NITRITE UR-MCNC: POSITIVE
NRBC # BLD: 0 /100 WBCS — SIGNIFICANT CHANGE UP (ref 0–0)
PH UR: 6 — SIGNIFICANT CHANGE UP (ref 5–8)
PLATELET # BLD AUTO: 349 K/UL — SIGNIFICANT CHANGE UP (ref 150–400)
POTASSIUM SERPL-MCNC: 4.6 MMOL/L — SIGNIFICANT CHANGE UP (ref 3.5–5.3)
POTASSIUM SERPL-SCNC: 4.6 MMOL/L — SIGNIFICANT CHANGE UP (ref 3.5–5.3)
PROT SERPL-MCNC: 9.5 GM/DL — HIGH (ref 6–8.3)
PROT UR-MCNC: 100 MG/DL
RBC # BLD: 4.74 M/UL — SIGNIFICANT CHANGE UP (ref 4.2–5.8)
RBC # FLD: 14 % — SIGNIFICANT CHANGE UP (ref 10.3–14.5)
RBC CASTS # UR COMP ASSIST: 2 /HPF — SIGNIFICANT CHANGE UP (ref 0–4)
SARS-COV-2 RNA SPEC QL NAA+PROBE: SIGNIFICANT CHANGE UP
SODIUM SERPL-SCNC: 133 MMOL/L — LOW (ref 135–145)
SP GR SPEC: 1.03 — SIGNIFICANT CHANGE UP (ref 1–1.03)
UROBILINOGEN FLD QL: 1 MG/DL — SIGNIFICANT CHANGE UP (ref 0.2–1)
WBC # BLD: 9.26 K/UL — SIGNIFICANT CHANGE UP (ref 3.8–10.5)
WBC # FLD AUTO: 9.26 K/UL — SIGNIFICANT CHANGE UP (ref 3.8–10.5)
WBC UR QL: >50 /HPF — HIGH (ref 0–5)

## 2024-07-01 PROCEDURE — 71045 X-RAY EXAM CHEST 1 VIEW: CPT | Mod: 26

## 2024-07-01 PROCEDURE — 74177 CT ABD & PELVIS W/CONTRAST: CPT | Mod: 26,MC

## 2024-07-01 PROCEDURE — 99285 EMERGENCY DEPT VISIT HI MDM: CPT

## 2024-07-01 RX ORDER — MORPHINE SULFATE 100 MG/1
4 TABLET, EXTENDED RELEASE ORAL ONCE
Refills: 0 | Status: DISCONTINUED | OUTPATIENT
Start: 2024-07-01 | End: 2024-07-01

## 2024-07-01 RX ORDER — SODIUM CHLORIDE 0.9 % (FLUSH) 0.9 %
1000 SYRINGE (ML) INJECTION ONCE
Refills: 0 | Status: COMPLETED | OUTPATIENT
Start: 2024-07-01 | End: 2024-07-01

## 2024-07-01 RX ORDER — CEFTRIAXONE SODIUM 500 MG
1000 VIAL (EA) INJECTION ONCE
Refills: 0 | Status: COMPLETED | OUTPATIENT
Start: 2024-07-01 | End: 2024-07-01

## 2024-07-01 RX ORDER — DIATRIZOATE MEGLUMINE 60 %
30 VIAL (ML) INJECTION ONCE
Refills: 0 | Status: COMPLETED | OUTPATIENT
Start: 2024-07-01 | End: 2024-07-01

## 2024-07-01 RX ORDER — ACETAMINOPHEN 325 MG
1000 TABLET ORAL ONCE
Refills: 0 | Status: COMPLETED | OUTPATIENT
Start: 2024-07-01 | End: 2024-07-01

## 2024-07-01 RX ORDER — ONDANSETRON HYDROCHLORIDE 2 MG/ML
4 INJECTION INTRAMUSCULAR; INTRAVENOUS ONCE
Refills: 0 | Status: COMPLETED | OUTPATIENT
Start: 2024-07-01 | End: 2024-07-01

## 2024-07-01 RX ADMIN — Medication 1000 MILLIGRAM(S): at 23:30

## 2024-07-01 RX ADMIN — MORPHINE SULFATE 4 MILLIGRAM(S): 100 TABLET, EXTENDED RELEASE ORAL at 21:48

## 2024-07-01 RX ADMIN — ONDANSETRON HYDROCHLORIDE 4 MILLIGRAM(S): 2 INJECTION INTRAMUSCULAR; INTRAVENOUS at 21:49

## 2024-07-01 RX ADMIN — Medication 30 MILLILITER(S): at 21:49

## 2024-07-01 RX ADMIN — Medication 400 MILLIGRAM(S): at 21:47

## 2024-07-01 RX ADMIN — MORPHINE SULFATE 4 MILLIGRAM(S): 100 TABLET, EXTENDED RELEASE ORAL at 23:30

## 2024-07-01 RX ADMIN — Medication 1000 MILLILITER(S): at 21:47

## 2024-07-02 DIAGNOSIS — N39.0 URINARY TRACT INFECTION, SITE NOT SPECIFIED: ICD-10-CM

## 2024-07-02 DIAGNOSIS — K59.00 CONSTIPATION, UNSPECIFIED: ICD-10-CM

## 2024-07-02 DIAGNOSIS — I82.90 ACUTE EMBOLISM AND THROMBOSIS OF UNSPECIFIED VEIN: ICD-10-CM

## 2024-07-02 DIAGNOSIS — Z95.828 PRESENCE OF OTHER VASCULAR IMPLANTS AND GRAFTS: Chronic | ICD-10-CM

## 2024-07-02 DIAGNOSIS — Z29.9 ENCOUNTER FOR PROPHYLACTIC MEASURES, UNSPECIFIED: ICD-10-CM

## 2024-07-02 DIAGNOSIS — L89.90 PRESSURE ULCER OF UNSPECIFIED SITE, UNSPECIFIED STAGE: ICD-10-CM

## 2024-07-02 DIAGNOSIS — Z97.8 PRESENCE OF OTHER SPECIFIED DEVICES: ICD-10-CM

## 2024-07-02 LAB
LACTATE SERPL-SCNC: 1.1 MMOL/L — SIGNIFICANT CHANGE UP (ref 0.7–2)
LACTATE SERPL-SCNC: 2.1 MMOL/L — HIGH (ref 0.7–2)

## 2024-07-02 PROCEDURE — 99232 SBSQ HOSP IP/OBS MODERATE 35: CPT

## 2024-07-02 PROCEDURE — 76937 US GUIDE VASCULAR ACCESS: CPT | Mod: 26

## 2024-07-02 PROCEDURE — 99223 1ST HOSP IP/OBS HIGH 75: CPT

## 2024-07-02 PROCEDURE — 36000 PLACE NEEDLE IN VEIN: CPT

## 2024-07-02 RX ORDER — FAMOTIDINE 40 MG
20 TABLET ORAL DAILY
Refills: 0 | Status: DISCONTINUED | OUTPATIENT
Start: 2024-07-02 | End: 2024-07-05

## 2024-07-02 RX ORDER — POLYETHYLENE GLYCOL 3350 1 G/G
17 POWDER ORAL
Refills: 0 | Status: DISCONTINUED | OUTPATIENT
Start: 2024-07-02 | End: 2024-07-05

## 2024-07-02 RX ORDER — VANCOMYCIN HYDROCHLORIDE 50 MG/ML
1500 KIT ORAL
Refills: 0 | Status: DISCONTINUED | OUTPATIENT
Start: 2024-07-02 | End: 2024-07-03

## 2024-07-02 RX ORDER — APIXABAN 5 MG/1
5 TABLET, FILM COATED ORAL EVERY 12 HOURS
Refills: 0 | Status: DISCONTINUED | OUTPATIENT
Start: 2024-07-02 | End: 2024-07-03

## 2024-07-02 RX ORDER — FAMOTIDINE 40 MG
1 TABLET ORAL
Refills: 0 | DISCHARGE

## 2024-07-02 RX ORDER — VANCOMYCIN HYDROCHLORIDE 50 MG/ML
1000 KIT ORAL ONCE
Refills: 0 | Status: COMPLETED | OUTPATIENT
Start: 2024-07-02 | End: 2024-07-02

## 2024-07-02 RX ORDER — OXYCODONE HYDROCHLORIDE 100 MG/5ML
1 SOLUTION ORAL
Refills: 0 | DISCHARGE

## 2024-07-02 RX ORDER — MORPHINE SULFATE 100 MG/1
2 TABLET, EXTENDED RELEASE ORAL ONCE
Refills: 0 | Status: DISCONTINUED | OUTPATIENT
Start: 2024-07-02 | End: 2024-07-02

## 2024-07-02 RX ORDER — APIXABAN 5 MG/1
1 TABLET, FILM COATED ORAL
Refills: 0 | DISCHARGE

## 2024-07-02 RX ORDER — ATORVASTATIN CALCIUM 20 MG/1
1 TABLET, FILM COATED ORAL
Refills: 0 | DISCHARGE

## 2024-07-02 RX ORDER — ACETAMINOPHEN 325 MG
650 TABLET ORAL EVERY 6 HOURS
Refills: 0 | Status: DISCONTINUED | OUTPATIENT
Start: 2024-07-02 | End: 2024-07-05

## 2024-07-02 RX ORDER — ONDANSETRON HYDROCHLORIDE 2 MG/ML
4 INJECTION INTRAMUSCULAR; INTRAVENOUS EVERY 8 HOURS
Refills: 0 | Status: DISCONTINUED | OUTPATIENT
Start: 2024-07-02 | End: 2024-07-05

## 2024-07-02 RX ORDER — GABAPENTIN
1 POWDER (GRAM) MISCELLANEOUS
Refills: 0 | DISCHARGE

## 2024-07-02 RX ORDER — MAGNESIUM, ALUMINUM HYDROXIDE 400-400
30 TABLET,CHEWABLE ORAL EVERY 4 HOURS
Refills: 0 | Status: DISCONTINUED | OUTPATIENT
Start: 2024-07-02 | End: 2024-07-05

## 2024-07-02 RX ORDER — SENNOSIDES 8.6 MG
2 TABLET ORAL AT BEDTIME
Refills: 0 | Status: DISCONTINUED | OUTPATIENT
Start: 2024-07-02 | End: 2024-07-05

## 2024-07-02 RX ORDER — MORPHINE SULFATE 100 MG/1
4 TABLET, EXTENDED RELEASE ORAL ONCE
Refills: 0 | Status: DISCONTINUED | OUTPATIENT
Start: 2024-07-02 | End: 2024-07-02

## 2024-07-02 RX ORDER — FERROUS SULFATE 325(65) MG
1 TABLET ORAL
Refills: 0 | DISCHARGE

## 2024-07-02 RX ORDER — ACETAMINOPHEN 325 MG
1000 TABLET ORAL ONCE
Refills: 0 | Status: COMPLETED | OUTPATIENT
Start: 2024-07-02 | End: 2024-07-02

## 2024-07-02 RX ORDER — DULOXETINE HYDROCHLORIDE 20 MG/1
1 CAPSULE, DELAYED RELEASE ORAL
Refills: 0 | DISCHARGE

## 2024-07-02 RX ORDER — APIXABAN 5 MG/1
2.5 TABLET, FILM COATED ORAL EVERY 12 HOURS
Refills: 0 | Status: DISCONTINUED | OUTPATIENT
Start: 2024-07-02 | End: 2024-07-02

## 2024-07-02 RX ORDER — CEFEPIME 1 G/1
1000 INJECTION, POWDER, FOR SOLUTION INTRAMUSCULAR; INTRAVENOUS EVERY 8 HOURS
Refills: 0 | Status: DISCONTINUED | OUTPATIENT
Start: 2024-07-02 | End: 2024-07-02

## 2024-07-02 RX ORDER — LAMOTRIGINE 100 MG/1
1 TABLET ORAL
Refills: 0 | DISCHARGE

## 2024-07-02 RX ORDER — OXYCODONE HYDROCHLORIDE 100 MG/5ML
10 SOLUTION ORAL ONCE
Refills: 0 | Status: DISCONTINUED | OUTPATIENT
Start: 2024-07-02 | End: 2024-07-02

## 2024-07-02 RX ORDER — LAMOTRIGINE 100 MG/1
25 TABLET ORAL DAILY
Refills: 0 | Status: DISCONTINUED | OUTPATIENT
Start: 2024-07-02 | End: 2024-07-05

## 2024-07-02 RX ORDER — ATORVASTATIN CALCIUM 20 MG/1
40 TABLET, FILM COATED ORAL AT BEDTIME
Refills: 0 | Status: DISCONTINUED | OUTPATIENT
Start: 2024-07-02 | End: 2024-07-05

## 2024-07-02 RX ORDER — DOCUSATE SODIUM 100 MG/1
2 CAPSULE, LIQUID FILLED ORAL
Refills: 0 | DISCHARGE

## 2024-07-02 RX ORDER — SENNOSIDES 8.6 MG
1 TABLET ORAL
Refills: 0 | DISCHARGE

## 2024-07-02 RX ORDER — GABAPENTIN
100 POWDER (GRAM) MISCELLANEOUS EVERY 8 HOURS
Refills: 0 | Status: DISCONTINUED | OUTPATIENT
Start: 2024-07-02 | End: 2024-07-05

## 2024-07-02 RX ORDER — CEFEPIME 1 G/1
2000 INJECTION, POWDER, FOR SOLUTION INTRAMUSCULAR; INTRAVENOUS EVERY 12 HOURS
Refills: 0 | Status: DISCONTINUED | OUTPATIENT
Start: 2024-07-02 | End: 2024-07-03

## 2024-07-02 RX ORDER — METHOCARBAMOL 500 MG
500 TABLET ORAL EVERY 6 HOURS
Refills: 0 | Status: COMPLETED | OUTPATIENT
Start: 2024-07-02 | End: 2024-07-03

## 2024-07-02 RX ORDER — DULOXETINE HYDROCHLORIDE 20 MG/1
60 CAPSULE, DELAYED RELEASE ORAL DAILY
Refills: 0 | Status: DISCONTINUED | OUTPATIENT
Start: 2024-07-02 | End: 2024-07-05

## 2024-07-02 RX ORDER — TAMSULOSIN HYDROCHLORIDE 0.4 MG/1
0.4 CAPSULE ORAL AT BEDTIME
Refills: 0 | Status: DISCONTINUED | OUTPATIENT
Start: 2024-07-02 | End: 2024-07-05

## 2024-07-02 RX ADMIN — CEFEPIME 100 MILLIGRAM(S): 1 INJECTION, POWDER, FOR SOLUTION INTRAMUSCULAR; INTRAVENOUS at 18:21

## 2024-07-02 RX ADMIN — Medication 10 MILLIGRAM(S): at 12:36

## 2024-07-02 RX ADMIN — LAMOTRIGINE 25 MILLIGRAM(S): 100 TABLET ORAL at 12:37

## 2024-07-02 RX ADMIN — OXYCODONE HYDROCHLORIDE 10 MILLIGRAM(S): 100 SOLUTION ORAL at 01:11

## 2024-07-02 RX ADMIN — Medication 2 TABLET(S): at 21:12

## 2024-07-02 RX ADMIN — VANCOMYCIN HYDROCHLORIDE 250 MILLIGRAM(S): KIT at 01:49

## 2024-07-02 RX ADMIN — MORPHINE SULFATE 4 MILLIGRAM(S): 100 TABLET, EXTENDED RELEASE ORAL at 18:49

## 2024-07-02 RX ADMIN — Medication 100 MILLIGRAM(S): at 13:15

## 2024-07-02 RX ADMIN — Medication 10 MILLIGRAM(S): at 17:46

## 2024-07-02 RX ADMIN — APIXABAN 5 MILLIGRAM(S): 5 TABLET, FILM COATED ORAL at 17:45

## 2024-07-02 RX ADMIN — MORPHINE SULFATE 4 MILLIGRAM(S): 100 TABLET, EXTENDED RELEASE ORAL at 17:45

## 2024-07-02 RX ADMIN — TAMSULOSIN HYDROCHLORIDE 0.4 MILLIGRAM(S): 0.4 CAPSULE ORAL at 21:12

## 2024-07-02 RX ADMIN — Medication 100 MILLIGRAM(S): at 21:12

## 2024-07-02 RX ADMIN — Medication 400 MILLIGRAM(S): at 16:09

## 2024-07-02 RX ADMIN — Medication 650 MILLIGRAM(S): at 08:39

## 2024-07-02 RX ADMIN — Medication 20 MILLIGRAM(S): at 13:15

## 2024-07-02 RX ADMIN — ATORVASTATIN CALCIUM 40 MILLIGRAM(S): 20 TABLET, FILM COATED ORAL at 21:12

## 2024-07-02 RX ADMIN — Medication 650 MILLIGRAM(S): at 08:21

## 2024-07-02 RX ADMIN — Medication 100 MILLIGRAM(S): at 00:15

## 2024-07-02 RX ADMIN — Medication 1000 MILLIGRAM(S): at 17:09

## 2024-07-02 RX ADMIN — DULOXETINE HYDROCHLORIDE 60 MILLIGRAM(S): 20 CAPSULE, DELAYED RELEASE ORAL at 12:37

## 2024-07-02 RX ADMIN — POLYETHYLENE GLYCOL 3350 17 GRAM(S): 1 POWDER ORAL at 17:46

## 2024-07-02 RX ADMIN — VANCOMYCIN HYDROCHLORIDE 300 MILLIGRAM(S): KIT at 12:37

## 2024-07-02 RX ADMIN — Medication 1000 MILLILITER(S): at 00:15

## 2024-07-02 RX ADMIN — MORPHINE SULFATE 2 MILLIGRAM(S): 100 TABLET, EXTENDED RELEASE ORAL at 14:15

## 2024-07-02 RX ADMIN — OXYCODONE HYDROCHLORIDE 10 MILLIGRAM(S): 100 SOLUTION ORAL at 02:11

## 2024-07-02 RX ADMIN — VANCOMYCIN HYDROCHLORIDE 300 MILLIGRAM(S): KIT at 23:43

## 2024-07-02 RX ADMIN — Medication 10 MILLIGRAM(S): at 01:11

## 2024-07-02 RX ADMIN — MORPHINE SULFATE 2 MILLIGRAM(S): 100 TABLET, EXTENDED RELEASE ORAL at 13:15

## 2024-07-03 LAB
ANION GAP SERPL CALC-SCNC: 12 MMOL/L — SIGNIFICANT CHANGE UP (ref 5–17)
BUN SERPL-MCNC: 14 MG/DL — SIGNIFICANT CHANGE UP (ref 7–23)
CALCIUM SERPL-MCNC: 9.6 MG/DL — SIGNIFICANT CHANGE UP (ref 8.5–10.1)
CHLORIDE SERPL-SCNC: 102 MMOL/L — SIGNIFICANT CHANGE UP (ref 96–108)
CO2 SERPL-SCNC: 20 MMOL/L — LOW (ref 22–31)
CREAT SERPL-MCNC: 0.92 MG/DL — SIGNIFICANT CHANGE UP (ref 0.5–1.3)
EGFR: 113 ML/MIN/1.73M2 — SIGNIFICANT CHANGE UP
GLUCOSE SERPL-MCNC: 118 MG/DL — HIGH (ref 70–99)
HCT VFR BLD CALC: 36.9 % — LOW (ref 39–50)
HGB BLD-MCNC: 12.4 G/DL — LOW (ref 13–17)
MCHC RBC-ENTMCNC: 27.3 PG — SIGNIFICANT CHANGE UP (ref 27–34)
MCHC RBC-ENTMCNC: 33.6 G/DL — SIGNIFICANT CHANGE UP (ref 32–36)
MCV RBC AUTO: 81.1 FL — SIGNIFICANT CHANGE UP (ref 80–100)
NRBC # BLD: 0 /100 WBCS — SIGNIFICANT CHANGE UP (ref 0–0)
PLATELET # BLD AUTO: 364 K/UL — SIGNIFICANT CHANGE UP (ref 150–400)
POTASSIUM SERPL-MCNC: 3.7 MMOL/L — SIGNIFICANT CHANGE UP (ref 3.5–5.3)
POTASSIUM SERPL-SCNC: 3.7 MMOL/L — SIGNIFICANT CHANGE UP (ref 3.5–5.3)
RBC # BLD: 4.55 M/UL — SIGNIFICANT CHANGE UP (ref 4.2–5.8)
RBC # FLD: 13.9 % — SIGNIFICANT CHANGE UP (ref 10.3–14.5)
SODIUM SERPL-SCNC: 134 MMOL/L — LOW (ref 135–145)
WBC # BLD: 11.1 K/UL — HIGH (ref 3.8–10.5)
WBC # FLD AUTO: 11.1 K/UL — HIGH (ref 3.8–10.5)

## 2024-07-03 PROCEDURE — 99232 SBSQ HOSP IP/OBS MODERATE 35: CPT

## 2024-07-03 RX ORDER — CEFTRIAXONE SODIUM 500 MG
2000 VIAL (EA) INJECTION EVERY 24 HOURS
Refills: 0 | Status: DISCONTINUED | OUTPATIENT
Start: 2024-07-03 | End: 2024-07-05

## 2024-07-03 RX ORDER — OXYCODONE HYDROCHLORIDE 100 MG/5ML
10 SOLUTION ORAL EVERY 6 HOURS
Refills: 0 | Status: DISCONTINUED | OUTPATIENT
Start: 2024-07-03 | End: 2024-07-05

## 2024-07-03 RX ADMIN — ATORVASTATIN CALCIUM 40 MILLIGRAM(S): 20 TABLET, FILM COATED ORAL at 23:06

## 2024-07-03 RX ADMIN — Medication 500 MILLIGRAM(S): at 16:47

## 2024-07-03 RX ADMIN — Medication 20 MILLIGRAM(S): at 12:26

## 2024-07-03 RX ADMIN — DULOXETINE HYDROCHLORIDE 60 MILLIGRAM(S): 20 CAPSULE, DELAYED RELEASE ORAL at 12:16

## 2024-07-03 RX ADMIN — TAMSULOSIN HYDROCHLORIDE 0.4 MILLIGRAM(S): 0.4 CAPSULE ORAL at 23:06

## 2024-07-03 RX ADMIN — Medication 100 MILLIGRAM(S): at 05:37

## 2024-07-03 RX ADMIN — POLYETHYLENE GLYCOL 3350 17 GRAM(S): 1 POWDER ORAL at 05:38

## 2024-07-03 RX ADMIN — Medication 100 MILLIGRAM(S): at 13:15

## 2024-07-03 RX ADMIN — OXYCODONE HYDROCHLORIDE 10 MILLIGRAM(S): 100 SOLUTION ORAL at 18:17

## 2024-07-03 RX ADMIN — Medication 30 MILLILITER(S): at 10:46

## 2024-07-03 RX ADMIN — Medication 100 MILLIGRAM(S): at 23:06

## 2024-07-03 RX ADMIN — OXYCODONE HYDROCHLORIDE 10 MILLIGRAM(S): 100 SOLUTION ORAL at 23:06

## 2024-07-03 RX ADMIN — Medication 500 MILLIGRAM(S): at 10:46

## 2024-07-03 RX ADMIN — VANCOMYCIN HYDROCHLORIDE 300 MILLIGRAM(S): KIT at 12:08

## 2024-07-03 RX ADMIN — CEFEPIME 100 MILLIGRAM(S): 1 INJECTION, POWDER, FOR SOLUTION INTRAMUSCULAR; INTRAVENOUS at 05:38

## 2024-07-03 RX ADMIN — Medication 650 MILLIGRAM(S): at 12:13

## 2024-07-03 RX ADMIN — Medication 650 MILLIGRAM(S): at 06:15

## 2024-07-03 RX ADMIN — Medication 650 MILLIGRAM(S): at 13:13

## 2024-07-03 RX ADMIN — POLYETHYLENE GLYCOL 3350 17 GRAM(S): 1 POWDER ORAL at 17:18

## 2024-07-03 RX ADMIN — APIXABAN 5 MILLIGRAM(S): 5 TABLET, FILM COATED ORAL at 05:37

## 2024-07-03 RX ADMIN — LAMOTRIGINE 25 MILLIGRAM(S): 100 TABLET ORAL at 12:16

## 2024-07-03 RX ADMIN — OXYCODONE HYDROCHLORIDE 10 MILLIGRAM(S): 100 SOLUTION ORAL at 17:17

## 2024-07-03 RX ADMIN — Medication 2 TABLET(S): at 23:06

## 2024-07-03 RX ADMIN — Medication 100 MILLIGRAM(S): at 17:19

## 2024-07-04 LAB
ANION GAP SERPL CALC-SCNC: 11 MMOL/L — SIGNIFICANT CHANGE UP (ref 5–17)
APTT BLD: 35 SEC — SIGNIFICANT CHANGE UP (ref 24.5–35.6)
BUN SERPL-MCNC: 15 MG/DL — SIGNIFICANT CHANGE UP (ref 7–23)
CALCIUM SERPL-MCNC: 9.9 MG/DL — SIGNIFICANT CHANGE UP (ref 8.5–10.1)
CHLORIDE SERPL-SCNC: 100 MMOL/L — SIGNIFICANT CHANGE UP (ref 96–108)
CO2 SERPL-SCNC: 26 MMOL/L — SIGNIFICANT CHANGE UP (ref 22–31)
CREAT SERPL-MCNC: 1.01 MG/DL — SIGNIFICANT CHANGE UP (ref 0.5–1.3)
EGFR: 101 ML/MIN/1.73M2 — SIGNIFICANT CHANGE UP
GLUCOSE SERPL-MCNC: 121 MG/DL — HIGH (ref 70–99)
HCT VFR BLD CALC: 38 % — LOW (ref 39–50)
HGB BLD-MCNC: 12.7 G/DL — LOW (ref 13–17)
INR BLD: 0.95 RATIO — SIGNIFICANT CHANGE UP (ref 0.85–1.18)
MCHC RBC-ENTMCNC: 27.1 PG — SIGNIFICANT CHANGE UP (ref 27–34)
MCHC RBC-ENTMCNC: 33.4 G/DL — SIGNIFICANT CHANGE UP (ref 32–36)
MCV RBC AUTO: 81.2 FL — SIGNIFICANT CHANGE UP (ref 80–100)
NRBC # BLD: 0 /100 WBCS — SIGNIFICANT CHANGE UP (ref 0–0)
PLATELET # BLD AUTO: 341 K/UL — SIGNIFICANT CHANGE UP (ref 150–400)
POTASSIUM SERPL-MCNC: 4 MMOL/L — SIGNIFICANT CHANGE UP (ref 3.5–5.3)
POTASSIUM SERPL-SCNC: 4 MMOL/L — SIGNIFICANT CHANGE UP (ref 3.5–5.3)
PROTHROM AB SERPL-ACNC: 11.3 SEC — SIGNIFICANT CHANGE UP (ref 9.5–13)
RBC # BLD: 4.68 M/UL — SIGNIFICANT CHANGE UP (ref 4.2–5.8)
RBC # FLD: 14.1 % — SIGNIFICANT CHANGE UP (ref 10.3–14.5)
SODIUM SERPL-SCNC: 137 MMOL/L — SIGNIFICANT CHANGE UP (ref 135–145)
WBC # BLD: 9.39 K/UL — SIGNIFICANT CHANGE UP (ref 3.8–10.5)
WBC # FLD AUTO: 9.39 K/UL — SIGNIFICANT CHANGE UP (ref 3.8–10.5)

## 2024-07-04 PROCEDURE — 99232 SBSQ HOSP IP/OBS MODERATE 35: CPT

## 2024-07-04 RX ORDER — CEFUROXIME SODIUM 7.5 G
500 VIAL (EA) INTRAVENOUS ONCE
Refills: 0 | Status: COMPLETED | OUTPATIENT
Start: 2024-07-04 | End: 2024-07-04

## 2024-07-04 RX ADMIN — OXYCODONE HYDROCHLORIDE 10 MILLIGRAM(S): 100 SOLUTION ORAL at 11:32

## 2024-07-04 RX ADMIN — DULOXETINE HYDROCHLORIDE 60 MILLIGRAM(S): 20 CAPSULE, DELAYED RELEASE ORAL at 11:32

## 2024-07-04 RX ADMIN — OXYCODONE HYDROCHLORIDE 10 MILLIGRAM(S): 100 SOLUTION ORAL at 00:06

## 2024-07-04 RX ADMIN — OXYCODONE HYDROCHLORIDE 10 MILLIGRAM(S): 100 SOLUTION ORAL at 18:47

## 2024-07-04 RX ADMIN — Medication 20 MILLIGRAM(S): at 11:32

## 2024-07-04 RX ADMIN — LAMOTRIGINE 25 MILLIGRAM(S): 100 TABLET ORAL at 11:32

## 2024-07-04 RX ADMIN — TAMSULOSIN HYDROCHLORIDE 0.4 MILLIGRAM(S): 0.4 CAPSULE ORAL at 21:02

## 2024-07-04 RX ADMIN — POLYETHYLENE GLYCOL 3350 17 GRAM(S): 1 POWDER ORAL at 17:45

## 2024-07-04 RX ADMIN — Medication 100 MILLIGRAM(S): at 21:01

## 2024-07-04 RX ADMIN — Medication 10 MILLIGRAM(S): at 04:14

## 2024-07-04 RX ADMIN — OXYCODONE HYDROCHLORIDE 10 MILLIGRAM(S): 100 SOLUTION ORAL at 06:03

## 2024-07-04 RX ADMIN — Medication 500 MILLIGRAM(S): at 17:45

## 2024-07-04 RX ADMIN — OXYCODONE HYDROCHLORIDE 10 MILLIGRAM(S): 100 SOLUTION ORAL at 12:32

## 2024-07-04 RX ADMIN — OXYCODONE HYDROCHLORIDE 10 MILLIGRAM(S): 100 SOLUTION ORAL at 17:48

## 2024-07-04 RX ADMIN — OXYCODONE HYDROCHLORIDE 10 MILLIGRAM(S): 100 SOLUTION ORAL at 05:03

## 2024-07-04 RX ADMIN — ATORVASTATIN CALCIUM 40 MILLIGRAM(S): 20 TABLET, FILM COATED ORAL at 21:01

## 2024-07-04 RX ADMIN — Medication 30 MILLILITER(S): at 20:02

## 2024-07-04 RX ADMIN — OXYCODONE HYDROCHLORIDE 10 MILLIGRAM(S): 100 SOLUTION ORAL at 23:51

## 2024-07-04 RX ADMIN — Medication 100 MILLIGRAM(S): at 13:48

## 2024-07-04 RX ADMIN — Medication 2 TABLET(S): at 21:02

## 2024-07-04 RX ADMIN — POLYETHYLENE GLYCOL 3350 17 GRAM(S): 1 POWDER ORAL at 05:04

## 2024-07-04 RX ADMIN — Medication 100 MILLIGRAM(S): at 05:03

## 2024-07-05 ENCOUNTER — TRANSCRIPTION ENCOUNTER (OUTPATIENT)
Age: 34
End: 2024-07-05

## 2024-07-05 VITALS
SYSTOLIC BLOOD PRESSURE: 116 MMHG | DIASTOLIC BLOOD PRESSURE: 70 MMHG | TEMPERATURE: 97 F | HEART RATE: 100 BPM | OXYGEN SATURATION: 96 % | RESPIRATION RATE: 18 BRPM

## 2024-07-05 PROCEDURE — 36410 VNPNXR 3YR/> PHY/QHP DX/THER: CPT | Mod: 59,LT

## 2024-07-05 PROCEDURE — 72192 CT PELVIS W/O DYE: CPT | Mod: 26

## 2024-07-05 PROCEDURE — 77012 CT SCAN FOR NEEDLE BIOPSY: CPT | Mod: 26

## 2024-07-05 PROCEDURE — 99232 SBSQ HOSP IP/OBS MODERATE 35: CPT

## 2024-07-05 PROCEDURE — 99239 HOSP IP/OBS DSCHRG MGMT >30: CPT

## 2024-07-05 PROCEDURE — 76937 US GUIDE VASCULAR ACCESS: CPT | Mod: 26

## 2024-07-05 PROCEDURE — 51102 DRAIN BL W/CATH INSERTION: CPT

## 2024-07-05 RX ORDER — CEFPODOXIME PROXETIL 50 MG/5 ML
1 SUSPENSION, RECONSTITUTED, ORAL (ML) ORAL
Qty: 12 | Refills: 0
Start: 2024-07-05 | End: 2024-07-10

## 2024-07-05 RX ORDER — ACETAMINOPHEN 325 MG
1000 TABLET ORAL ONCE
Refills: 0 | Status: COMPLETED | OUTPATIENT
Start: 2024-07-05 | End: 2024-07-05

## 2024-07-05 RX ORDER — TAMSULOSIN HYDROCHLORIDE 0.4 MG/1
1 CAPSULE ORAL
Qty: 30 | Refills: 0
Start: 2024-07-05 | End: 2024-08-03

## 2024-07-05 RX ORDER — HYDROCORTISONE VALERATE 0.2 %
1 CREAM (GRAM) TOPICAL
Qty: 1 | Refills: 0
Start: 2024-07-05 | End: 2024-08-03

## 2024-07-05 RX ADMIN — Medication 100 MILLIGRAM(S): at 13:20

## 2024-07-05 RX ADMIN — OXYCODONE HYDROCHLORIDE 10 MILLIGRAM(S): 100 SOLUTION ORAL at 14:25

## 2024-07-05 RX ADMIN — Medication 650 MILLIGRAM(S): at 05:55

## 2024-07-05 RX ADMIN — LAMOTRIGINE 25 MILLIGRAM(S): 100 TABLET ORAL at 12:43

## 2024-07-05 RX ADMIN — Medication 100 MILLIGRAM(S): at 05:31

## 2024-07-05 RX ADMIN — OXYCODONE HYDROCHLORIDE 10 MILLIGRAM(S): 100 SOLUTION ORAL at 00:21

## 2024-07-05 RX ADMIN — OXYCODONE HYDROCHLORIDE 10 MILLIGRAM(S): 100 SOLUTION ORAL at 13:20

## 2024-07-05 RX ADMIN — Medication 3 MILLIGRAM(S): at 01:14

## 2024-07-05 RX ADMIN — OXYCODONE HYDROCHLORIDE 10 MILLIGRAM(S): 100 SOLUTION ORAL at 05:55

## 2024-07-05 RX ADMIN — DULOXETINE HYDROCHLORIDE 60 MILLIGRAM(S): 20 CAPSULE, DELAYED RELEASE ORAL at 12:43

## 2024-07-05 RX ADMIN — Medication 20 MILLIGRAM(S): at 12:43

## 2024-07-05 RX ADMIN — Medication 650 MILLIGRAM(S): at 06:25

## 2024-07-06 LAB
-  AMOXICILLIN/CLAVULANIC ACID: SIGNIFICANT CHANGE UP
-  AMPICILLIN/SULBACTAM: SIGNIFICANT CHANGE UP
-  AMPICILLIN: SIGNIFICANT CHANGE UP
-  AZTREONAM: SIGNIFICANT CHANGE UP
-  CEFAZOLIN: SIGNIFICANT CHANGE UP
-  CEFEPIME: SIGNIFICANT CHANGE UP
-  CEFOXITIN: SIGNIFICANT CHANGE UP
-  CEFTRIAXONE: SIGNIFICANT CHANGE UP
-  CIPROFLOXACIN: SIGNIFICANT CHANGE UP
-  ERTAPENEM: SIGNIFICANT CHANGE UP
-  LEVOFLOXACIN: SIGNIFICANT CHANGE UP
-  MEROPENEM: SIGNIFICANT CHANGE UP
-  NITROFURANTOIN: SIGNIFICANT CHANGE UP
-  PIPERACILLIN/TAZOBACTAM: SIGNIFICANT CHANGE UP
-  TRIMETHOPRIM/SULFAMETHOXAZOLE: SIGNIFICANT CHANGE UP
CULTURE RESULTS: ABNORMAL
METHOD TYPE: SIGNIFICANT CHANGE UP
ORGANISM # SPEC MICROSCOPIC CNT: ABNORMAL
ORGANISM # SPEC MICROSCOPIC CNT: SIGNIFICANT CHANGE UP
SPECIMEN SOURCE: SIGNIFICANT CHANGE UP

## 2024-07-07 LAB
CULTURE RESULTS: SIGNIFICANT CHANGE UP
CULTURE RESULTS: SIGNIFICANT CHANGE UP
SPECIMEN SOURCE: SIGNIFICANT CHANGE UP
SPECIMEN SOURCE: SIGNIFICANT CHANGE UP

## 2024-07-11 DIAGNOSIS — L30.9 DERMATITIS, UNSPECIFIED: ICD-10-CM

## 2024-07-11 DIAGNOSIS — N39.0 URINARY TRACT INFECTION, SITE NOT SPECIFIED: ICD-10-CM

## 2024-07-11 DIAGNOSIS — T83.511A INFECTION AND INFLAMMATORY REACTION DUE TO INDWELLING URETHRAL CATHETER, INITIAL ENCOUNTER: ICD-10-CM
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
## 2024-07-11 DIAGNOSIS — L89.512 PRESSURE ULCER OF RIGHT ANKLE, STAGE 2: ICD-10-CM

## 2024-07-11 DIAGNOSIS — R33.9 RETENTION OF URINE, UNSPECIFIED: ICD-10-CM

## 2024-07-11 DIAGNOSIS — K59.00 CONSTIPATION, UNSPECIFIED: ICD-10-CM

## 2024-07-11 DIAGNOSIS — I82.220 ACUTE EMBOLISM AND THROMBOSIS OF INFERIOR VENA CAVA: ICD-10-CM

## 2024-08-27 ENCOUNTER — INPATIENT (INPATIENT)
Facility: HOSPITAL | Age: 34
LOS: 2 days | Discharge: NURSING, REHAB OR LONG-TERM CARE | End: 2024-08-30
Attending: INTERNAL MEDICINE | Admitting: INTERNAL MEDICINE
Payer: MEDICAID

## 2024-08-27 VITALS
RESPIRATION RATE: 18 BRPM | TEMPERATURE: 100 F | OXYGEN SATURATION: 100 % | HEIGHT: 74 IN | WEIGHT: 268.08 LBS | HEART RATE: 110 BPM | SYSTOLIC BLOOD PRESSURE: 210 MMHG | DIASTOLIC BLOOD PRESSURE: 146 MMHG

## 2024-08-27 DIAGNOSIS — Z95.828 PRESENCE OF OTHER VASCULAR IMPLANTS AND GRAFTS: Chronic | ICD-10-CM

## 2024-08-27 DIAGNOSIS — Z93.3 COLOSTOMY STATUS: Chronic | ICD-10-CM

## 2024-08-27 PROBLEM — I82.90 ACUTE EMBOLISM AND THROMBOSIS OF UNSPECIFIED VEIN: Chronic | Status: ACTIVE | Noted: 2024-07-02

## 2024-08-27 LAB
ALBUMIN SERPL ELPH-MCNC: 4 G/DL — SIGNIFICANT CHANGE UP (ref 3.3–5)
ALP SERPL-CCNC: 101 U/L — SIGNIFICANT CHANGE UP (ref 40–120)
ALT FLD-CCNC: 29 U/L — SIGNIFICANT CHANGE UP (ref 12–78)
ANION GAP SERPL CALC-SCNC: 12 MMOL/L — SIGNIFICANT CHANGE UP (ref 5–17)
APPEARANCE UR: ABNORMAL
AST SERPL-CCNC: 25 U/L — SIGNIFICANT CHANGE UP (ref 15–37)
BACTERIA # UR AUTO: ABNORMAL /HPF
BASOPHILS # BLD AUTO: 0.05 K/UL — SIGNIFICANT CHANGE UP (ref 0–0.2)
BASOPHILS NFR BLD AUTO: 0.5 % — SIGNIFICANT CHANGE UP (ref 0–2)
BILIRUB SERPL-MCNC: 0.3 MG/DL — SIGNIFICANT CHANGE UP (ref 0.2–1.2)
BILIRUB UR-MCNC: NEGATIVE — SIGNIFICANT CHANGE UP
BUN SERPL-MCNC: 8 MG/DL — SIGNIFICANT CHANGE UP (ref 7–23)
CALCIUM SERPL-MCNC: 10 MG/DL — SIGNIFICANT CHANGE UP (ref 8.5–10.1)
CHLORIDE SERPL-SCNC: 102 MMOL/L — SIGNIFICANT CHANGE UP (ref 96–108)
CO2 SERPL-SCNC: 23 MMOL/L — SIGNIFICANT CHANGE UP (ref 22–31)
COLOR SPEC: ABNORMAL
CREAT SERPL-MCNC: 0.88 MG/DL — SIGNIFICANT CHANGE UP (ref 0.5–1.3)
DIFF PNL FLD: ABNORMAL
EGFR: 116 ML/MIN/1.73M2 — SIGNIFICANT CHANGE UP
EOSINOPHIL # BLD AUTO: 0.15 K/UL — SIGNIFICANT CHANGE UP (ref 0–0.5)
EOSINOPHIL NFR BLD AUTO: 1.6 % — SIGNIFICANT CHANGE UP (ref 0–6)
EPI CELLS # UR: PRESENT
GLUCOSE SERPL-MCNC: 123 MG/DL — HIGH (ref 70–99)
GLUCOSE UR QL: NEGATIVE MG/DL — SIGNIFICANT CHANGE UP
HCT VFR BLD CALC: 40.2 % — SIGNIFICANT CHANGE UP (ref 39–50)
HGB BLD-MCNC: 13.3 G/DL — SIGNIFICANT CHANGE UP (ref 13–17)
IMM GRANULOCYTES NFR BLD AUTO: 0.3 % — SIGNIFICANT CHANGE UP (ref 0–0.9)
KETONES UR-MCNC: NEGATIVE MG/DL — SIGNIFICANT CHANGE UP
LEUKOCYTE ESTERASE UR-ACNC: ABNORMAL
LYMPHOCYTES # BLD AUTO: 2.49 K/UL — SIGNIFICANT CHANGE UP (ref 1–3.3)
LYMPHOCYTES # BLD AUTO: 26.9 % — SIGNIFICANT CHANGE UP (ref 13–44)
MCHC RBC-ENTMCNC: 26.7 PG — LOW (ref 27–34)
MCHC RBC-ENTMCNC: 33.1 G/DL — SIGNIFICANT CHANGE UP (ref 32–36)
MCV RBC AUTO: 80.6 FL — SIGNIFICANT CHANGE UP (ref 80–100)
MONOCYTES # BLD AUTO: 0.66 K/UL — SIGNIFICANT CHANGE UP (ref 0–0.9)
MONOCYTES NFR BLD AUTO: 7.1 % — SIGNIFICANT CHANGE UP (ref 2–14)
NEUTROPHILS # BLD AUTO: 5.88 K/UL — SIGNIFICANT CHANGE UP (ref 1.8–7.4)
NEUTROPHILS NFR BLD AUTO: 63.6 % — SIGNIFICANT CHANGE UP (ref 43–77)
NITRITE UR-MCNC: NEGATIVE — SIGNIFICANT CHANGE UP
NRBC # BLD: 0 /100 WBCS — SIGNIFICANT CHANGE UP (ref 0–0)
PH UR: 7 — SIGNIFICANT CHANGE UP (ref 5–8)
PLATELET # BLD AUTO: 320 K/UL — SIGNIFICANT CHANGE UP (ref 150–400)
POTASSIUM SERPL-MCNC: 3.9 MMOL/L — SIGNIFICANT CHANGE UP (ref 3.5–5.3)
POTASSIUM SERPL-SCNC: 3.9 MMOL/L — SIGNIFICANT CHANGE UP (ref 3.5–5.3)
PROT SERPL-MCNC: 10.1 GM/DL — HIGH (ref 6–8.3)
PROT UR-MCNC: 300 MG/DL
RBC # BLD: 4.99 M/UL — SIGNIFICANT CHANGE UP (ref 4.2–5.8)
RBC # FLD: 13.2 % — SIGNIFICANT CHANGE UP (ref 10.3–14.5)
RBC CASTS # UR COMP ASSIST: ABNORMAL /HPF
SODIUM SERPL-SCNC: 137 MMOL/L — SIGNIFICANT CHANGE UP (ref 135–145)
SP GR SPEC: 1.02 — SIGNIFICANT CHANGE UP (ref 1–1.03)
UROBILINOGEN FLD QL: 1 MG/DL — SIGNIFICANT CHANGE UP (ref 0.2–1)
WBC # BLD: 9.26 K/UL — SIGNIFICANT CHANGE UP (ref 3.8–10.5)
WBC # FLD AUTO: 9.26 K/UL — SIGNIFICANT CHANGE UP (ref 3.8–10.5)
WBC UR QL: 10 /HPF — HIGH (ref 0–5)

## 2024-08-27 PROCEDURE — 99285 EMERGENCY DEPT VISIT HI MDM: CPT

## 2024-08-27 PROCEDURE — 74177 CT ABD & PELVIS W/CONTRAST: CPT | Mod: 26,MC

## 2024-08-27 PROCEDURE — 99223 1ST HOSP IP/OBS HIGH 75: CPT

## 2024-08-27 RX ORDER — ACETAMINOPHEN 325 MG/1
1000 TABLET ORAL ONCE
Refills: 0 | Status: COMPLETED | OUTPATIENT
Start: 2024-08-27 | End: 2024-08-27

## 2024-08-27 RX ORDER — APIXABAN 5 MG/1
5 TABLET, FILM COATED ORAL
Refills: 0 | Status: DISCONTINUED | OUTPATIENT
Start: 2024-08-27 | End: 2024-08-30

## 2024-08-27 RX ORDER — LAMOTRIGINE 100 MG/1
25 TABLET, EXTENDED RELEASE ORAL DAILY
Refills: 0 | Status: DISCONTINUED | OUTPATIENT
Start: 2024-08-27 | End: 2024-08-30

## 2024-08-27 RX ORDER — CYCLOBENZAPRINE HCL 10 MG
10 TABLET ORAL ONCE
Refills: 0 | Status: COMPLETED | OUTPATIENT
Start: 2024-08-27 | End: 2024-08-27

## 2024-08-27 RX ORDER — SENNA 187 MG
1 TABLET ORAL
Refills: 0 | Status: DISCONTINUED | OUTPATIENT
Start: 2024-08-27 | End: 2024-08-30

## 2024-08-27 RX ORDER — DULOXETINE HCL 30 MG
60 CAPSULE,DELAYED RELEASE (ENTERIC COATED) ORAL DAILY
Refills: 0 | Status: DISCONTINUED | OUTPATIENT
Start: 2024-08-27 | End: 2024-08-30

## 2024-08-27 RX ORDER — FAMOTIDINE 10 MG/ML
20 INJECTION INTRAVENOUS DAILY
Refills: 0 | Status: DISCONTINUED | OUTPATIENT
Start: 2024-08-27 | End: 2024-08-30

## 2024-08-27 RX ORDER — FERROUS SULFATE 325(65) MG
325 TABLET ORAL DAILY
Refills: 0 | Status: DISCONTINUED | OUTPATIENT
Start: 2024-08-27 | End: 2024-08-30

## 2024-08-27 RX ORDER — GABAPENTIN 100 MG
100 CAPSULE ORAL EVERY 8 HOURS
Refills: 0 | Status: DISCONTINUED | OUTPATIENT
Start: 2024-08-27 | End: 2024-08-30

## 2024-08-27 RX ORDER — OXYCODONE HYDROCHLORIDE 5 MG/1
10 TABLET ORAL EVERY 6 HOURS
Refills: 0 | Status: DISCONTINUED | OUTPATIENT
Start: 2024-08-27 | End: 2024-08-30

## 2024-08-27 RX ORDER — OXYCODONE HYDROCHLORIDE 5 MG/1
10 TABLET ORAL ONCE
Refills: 0 | Status: DISCONTINUED | OUTPATIENT
Start: 2024-08-27 | End: 2024-08-27

## 2024-08-27 RX ORDER — SODIUM CHLORIDE 9 MG/ML
1000 INJECTION INTRAMUSCULAR; INTRAVENOUS; SUBCUTANEOUS ONCE
Refills: 0 | Status: COMPLETED | OUTPATIENT
Start: 2024-08-27 | End: 2024-08-27

## 2024-08-27 RX ORDER — CYCLOBENZAPRINE HCL 10 MG
10 TABLET ORAL ONCE
Refills: 0 | Status: COMPLETED | OUTPATIENT
Start: 2024-08-27 | End: 2024-08-28

## 2024-08-27 RX ADMIN — SODIUM CHLORIDE 1000 MILLILITER(S): 9 INJECTION INTRAMUSCULAR; INTRAVENOUS; SUBCUTANEOUS at 16:55

## 2024-08-27 RX ADMIN — ACETAMINOPHEN 1000 MILLIGRAM(S): 325 TABLET ORAL at 17:10

## 2024-08-27 RX ADMIN — Medication 100 MILLIGRAM(S): at 20:04

## 2024-08-27 RX ADMIN — Medication 4 MILLIGRAM(S): at 17:55

## 2024-08-27 RX ADMIN — OXYCODONE HYDROCHLORIDE 10 MILLIGRAM(S): 5 TABLET ORAL at 23:27

## 2024-08-27 RX ADMIN — ACETAMINOPHEN 400 MILLIGRAM(S): 325 TABLET ORAL at 16:55

## 2024-08-27 RX ADMIN — ACETAMINOPHEN 1000 MILLIGRAM(S): 325 TABLET ORAL at 17:55

## 2024-08-27 RX ADMIN — Medication 10 MILLIGRAM(S): at 16:55

## 2024-08-27 RX ADMIN — Medication 4 MILLIGRAM(S): at 16:55

## 2024-08-27 RX ADMIN — Medication 1000 MILLILITER(S): at 20:27

## 2024-08-27 NOTE — ED PROVIDER NOTE - PROGRESS NOTE DETAILS
Patient reports improved but still with some pain.  Takes oxycodone regularly, did not take today.  Pain controlled now.  Labs and imaging non actionable.  Will treat for uti.  Patient comfortable with plan.  Will dc

## 2024-08-27 NOTE — ED ADULT TRIAGE NOTE - HEIGHT IN CM
"Chief Complaint   Patient presents with   • Bloated     Patient is not getting better   • Diarrhea     Subjective     HPI  Hema Sweet is a 28 y.o. male who presents for follow up of iron deficiency anemia due to excessive hemorrhoidal bleeding and alternating diarrhea and constipation.  He has been off the iron for about 3 weeks now.  Labs 11/13/17 showed normallzed Hb, iron, ferritin. Since being off the iron, he has had predominantly diarrhea. It has worsened for him.  He averages about 4-10 BMs per day.  He gets a lot of cramping and urgency.  Stools are occurring mostly after eating.  He does feel \"wiped out\" and with persistent cramping after his BMs.  Has not identified any particular food triggers.  He has a lot of anxiety about eating out at restaurants due to precipitating the symptoms.  Weight is stable. No blood in his stool.     He has been taking the levsin but wih only a little relief. He is still taking the docusate.  He has been fearful to try any stronger antidiarrheal agents, such as Imodium, due to his history of constipation and hemorrhoids.    Past Medical History:   Diagnosis Date   • Allergic rhinitis    • Anemia     IRON DEF, D/T BLOOD LOSS   • Bell's palsy    • Chronic diarrhea    • External hemorrhoids    • History of transfusion of packed red blood cells 2017   • Internal hemorrhoids    • Migraine    • Mononucleosis        Social History     Social History   • Marital status: Single     Spouse name: N/A   • Number of children: N/A   • Years of education: N/A     Social History Main Topics   • Smoking status: Never Smoker   • Smokeless tobacco: Never Used   • Alcohol use Yes      Comment: rare   • Drug use: No   • Sexual activity: Defer     Other Topics Concern   • None     Social History Narrative         Current Outpatient Prescriptions:   •  docusate sodium (COLACE) 100 MG capsule, Take 1 capsule by mouth Daily. Can increase to 2 pills daily, Disp: 60 capsule, Rfl: 2  •  hyoscyamine " (ANASPAZ,LEVSIN) 0.125 MG tablet, Take 1 tablet by mouth Every 6 (Six) Hours As Needed for Cramping or Diarrhea., Disp: 90 tablet, Rfl: 1  •  lidocaine (XYLOCAINE) 5 % ointment, Apply  topically 3 (Three) Times a Day., Disp: 50 g, Rfl: 0  •  pantoprazole (PROTONIX) 40 MG EC tablet, Take 1 tablet by mouth Daily., Disp: 60 tablet, Rfl: 1  •  polyethylene glycol (MIRALAX) packet, Take 17 g by mouth 2 (Two) Times a Day. (Patient taking differently: Take 17 g by mouth As Needed.), Disp: 225 g, Rfl: 0    Review of Systems   Constitutional: Negative for activity change, appetite change and fatigue.   HENT: Negative for congestion, sore throat and trouble swallowing.    Respiratory: Negative.    Cardiovascular: Negative.    Gastrointestinal: Positive for abdominal pain and diarrhea. Negative for abdominal distention, anal bleeding, blood in stool, constipation and nausea.   Endocrine: Negative for cold intolerance and heat intolerance.   Genitourinary: Negative for difficulty urinating, dysuria and frequency.   Musculoskeletal: Negative for arthralgias, back pain and myalgias.   Skin: Negative.    Hematological: Negative for adenopathy. Does not bruise/bleed easily.   All other systems reviewed and are negative.      Objective   Vitals:    11/17/17 1327   BP: 110/70   Temp: 98.4 °F (36.9 °C)     Last Weight    11/17/17  1327   Weight: 173 lb (78.5 kg)     Body mass index is 27.92 kg/(m^2).      Physical Exam   Constitutional: He is oriented to person, place, and time. He appears well-developed and well-nourished. No distress.   HENT:   Head: Normocephalic and atraumatic.   Right Ear: External ear normal.   Left Ear: External ear normal.   Nose: Nose normal.   Eyes: Conjunctivae and EOM are normal. No scleral icterus.   Cardiovascular: Normal rate, regular rhythm, normal heart sounds and intact distal pulses.  Exam reveals no gallop.    No murmur heard.  No lower extremity edema   Pulmonary/Chest: Effort normal and breath  sounds normal. No respiratory distress. He has no wheezes.   Abdominal: Soft. Normal appearance and bowel sounds are normal. He exhibits no distension and no mass. There is no hepatosplenomegaly. There is no rigidity, no rebound and no guarding. No hernia.   Genitourinary:   Genitourinary Comments: Rectal exam deferred   Musculoskeletal: Normal range of motion. He exhibits no edema or tenderness.   Normal digits and nails of both hands.  No atrophy or wasting of upper or lower extremeties   Neurological: He is alert and oriented to person, place, and time. He displays no atrophy. Coordination normal.   Skin: Skin is warm and dry. No rash noted. He is not diaphoretic. No erythema.   Psychiatric: He has a normal mood and affect. His behavior is normal. Judgment and thought content normal.   Vitals reviewed.      WBC   Date Value Ref Range Status   11/13/2017 6.28 4.50 - 10.70 10*3/mm3 Final   04/06/2017 8.21 4.50 - 10.70 10*3/mm3 Final     RBC   Date Value Ref Range Status   11/13/2017 5.34 4.60 - 6.00 10*6/mm3 Final   04/06/2017 5.02 4.60 - 6.00 10*6/mm3 Final     Hemoglobin   Date Value Ref Range Status   11/13/2017 15.3 13.7 - 17.6 g/dL Final   04/06/2017 10.9 (L) 13.7 - 17.6 g/dL Final     Hematocrit   Date Value Ref Range Status   11/13/2017 46.7 40.4 - 52.2 % Final   04/06/2017 35.1 (L) 40.4 - 52.2 % Final     MCV   Date Value Ref Range Status   11/13/2017 87.5 79.8 - 96.2 fL Final   04/06/2017 69.9 (L) 79.8 - 96.2 fL Final     MCH   Date Value Ref Range Status   11/13/2017 28.7 27.0 - 32.7 pg Final   04/06/2017 21.7 (L) 27.0 - 32.7 pg Final     MCHC   Date Value Ref Range Status   11/13/2017 32.8 32.6 - 36.4 g/dL Final   04/06/2017 31.1 (L) 32.6 - 36.4 g/dL Final     RDW   Date Value Ref Range Status   11/13/2017 13.8 11.5 - 14.5 % Final   04/06/2017  11.5 - 14.5 % Final     Comment:     NOT ABLE TO CALCULATE     RDW-SD   Date Value Ref Range Status   03/28/2017 67.0 (H) 37.0 - 54.0 fl Final     MPV   Date  Value Ref Range Status   04/06/2017 9.8 6.0 - 12.0 fL Final     Platelets   Date Value Ref Range Status   11/13/2017 278 140 - 500 10*3/mm3 Final   04/06/2017 379 140 - 500 10*3/mm3 Final     Neutrophil %   Date Value Ref Range Status   04/06/2017 58.2 42.7 - 76.0 % Final     Neutrophil Rel %   Date Value Ref Range Status   11/13/2017 58.9 42.7 - 76.0 % Final     Lymphocyte %   Date Value Ref Range Status   04/06/2017 30.7 19.6 - 45.3 % Final     Lymphocyte Rel %   Date Value Ref Range Status   11/13/2017 31.2 19.6 - 45.3 % Final     Monocyte %   Date Value Ref Range Status   04/06/2017 9.6 5.0 - 12.0 % Final     Monocyte Rel %   Date Value Ref Range Status   11/13/2017 8.3 5.0 - 12.0 % Final     Eosinophil %   Date Value Ref Range Status   04/06/2017 0.7 0.3 - 6.2 % Final     Eosinophil Rel %   Date Value Ref Range Status   11/13/2017 1.1 0.3 - 6.2 % Final     Basophil %   Date Value Ref Range Status   04/06/2017 0.6 0.0 - 1.5 % Final     Basophil Rel %   Date Value Ref Range Status   11/13/2017 0.5 0.0 - 1.5 % Final     Immature Grans %   Date Value Ref Range Status   04/06/2017 0.2 0.0 - 0.5 % Final     Neutrophils, Absolute   Date Value Ref Range Status   04/06/2017 4.77 1.90 - 8.10 10*3/mm3 Final     Neutrophils Absolute   Date Value Ref Range Status   11/13/2017 3.70 1.90 - 8.10 10*3/mm3 Final     Lymphocytes, Absolute   Date Value Ref Range Status   04/06/2017 2.52 0.90 - 4.80 10*3/mm3 Final     Lymphocytes Absolute   Date Value Ref Range Status   11/13/2017 1.96 0.90 - 4.80 10*3/mm3 Final     Monocytes, Absolute   Date Value Ref Range Status   04/06/2017 0.79 0.20 - 1.20 10*3/mm3 Final     Monocytes Absolute   Date Value Ref Range Status   11/13/2017 0.52 0.20 - 1.20 10*3/mm3 Final     Eosinophils, Absolute   Date Value Ref Range Status   04/06/2017 0.06 0.00 - 0.70 10*3/mm3 Final     Eosinophils Absolute   Date Value Ref Range Status   11/13/2017 0.07 0.00 - 0.70 10*3/mm3 Final     Basophils, Absolute    Date Value Ref Range Status   04/06/2017 0.05 0.00 - 0.20 10*3/mm3 Final     Basophils Absolute   Date Value Ref Range Status   11/13/2017 0.03 0.00 - 0.20 10*3/mm3 Final     Immature Grans, Absolute   Date Value Ref Range Status   04/06/2017 0.02 0.00 - 0.03 10*3/mm3 Final     nRBC   Date Value Ref Range Status   05/19/2017 0.0 0.0 - 0.0 /100 WBC Final   04/06/2017 0.0 0.0 - 0.0 /100 WBC Final       Lab Results   Component Value Date    GLUCOSE 90 04/06/2017    BUN 11 04/06/2017    CREATININE 0.95 04/06/2017    EGFRIFNONA 96 04/06/2017    EGFRIFAFRI 125 03/20/2017    BCR 11.6 04/06/2017    CO2 24.3 04/06/2017    CALCIUM 9.9 04/06/2017    PROTENTOTREF 6.8 03/20/2017    ALBUMIN 4.70 04/06/2017    LABIL2 1.7 04/06/2017    AST 16 04/06/2017    ALT 32 04/06/2017         Imaging Results (last 7 days)     ** No results found for the last 168 hours. **            Assessment/Plan    1. Iron deficiency anemia: Resolved.  He is now off of iron supplementation  2.  Alternating diarrhea and constipation: Now that he is off of iron, he is having predominantly diarrhea.  It has worsened.  It is becoming quite problematic for him  3. Internal and external hemorrhoids: s/p hemorrhoidectomy, no further bleeding    Plan:  -He is to stop the docusate to see how that impacts his stools  -If he is still having diarrhea I advised him to start with one Imodium tablet every morning.  If this helps him without excessive constipation he can take it every 6 hours to control his stools  -Start a twice daily fiber supplementation for stool bulking  -If he gets no relief with this, he is to call the office and I will try WelChol for him  -If he persists with predominantly diarrhea, then Viberzi might be an option, however I am worried given his constipation history that this would send him more in that direction    Hema was seen today for bloated and diarrhea.    Diagnoses and all orders for this visit:    Iron deficiency anemia due to  187.96 chronic blood loss    Functional diarrhea    Grade II hemorrhoids        Dictated utilizing Dragon dictation

## 2024-08-27 NOTE — ED PROVIDER NOTE - CLINICAL SUMMARY MEDICAL DECISION MAKING FREE TEXT BOX
35yo male with pmh paraplegia, vte, chronic wilson, presenting with pain and leaking around suprapubic wilson site.  Reports about 2 weeks of symptoms worsening over this time.  No fevers at home.  No nausea, vomiting, diarrhea, constipation, flank pain.  Bloody urine in bag.   Catheter appears to be placed by IR, will discuss with uro for recs.  Plan labs, urine, +/- imaging, reassess.

## 2024-08-27 NOTE — CONSULT NOTE ADULT - NSCONSULTADDITIONALINFOA_GEN_ALL_CORE
Attg.    Who does pt see?? For upsizing as out pt?    Tube just placed 6 wks ago. Should have first exchange in IR w fluoro and possibly upsize to a 16 Fr    F/u cx's    Keep abx

## 2024-08-27 NOTE — ED ADULT TRIAGE NOTE - CHIEF COMPLAINT QUOTE
as per patient " coming from nursing home with clogged/leakage/pain to supra pubic catheter x 2 weeks" [ hx of paraplegia, pressure ulcer]

## 2024-08-27 NOTE — CONSULT NOTE ADULT - SUBJECTIVE AND OBJECTIVE BOX
Patient is a 34y old  Male who presents with a chief complaint of     HPI: 35 yo M  pmhx of paraplegia from [facility]. Hx of nec fasc, ex lap with creation of ostomy. reversal of ostomy, had chronic wilson s/p IR SPC placed 7/5/24 sent here for SPC malfunction. as per pt leaking for 2 weeks, was irrigating 3 days ago but RN was unable to do so past few days. pt endorses subjective fevers chills.       PAST MEDICAL & SURGICAL HISTORY:  Paraplegia      Colostomy in place      VTE (venous thromboembolism)      Colostomy present      S/P IVC filter          Review of Systems:  Negative except  as above in HPI    MEDICATIONS  (STANDING):    MEDICATIONS  (PRN):      Allergies    Lidocaine 4% Patch (Blisters)    Intolerances    lactose (Diarrhea)      SOCIAL HISTORY           FAMILY HISTORY:  No pertinent family history in first degree relatives        Vital Signs Last 24 Hrs  T(C): 37.7 (27 Aug 2024 16:13), Max: 38 (27 Aug 2024 14:46)  T(F): 99.8 (27 Aug 2024 16:13), Max: 100.4 (27 Aug 2024 14:46)  HR: 112 (27 Aug 2024 16:13) (110 - 112)  BP: 189/126 (27 Aug 2024 16:13) (189/126 - 210/146)  BP(mean): --  RR: 19 (27 Aug 2024 16:13) (18 - 19)  SpO2: 100% (27 Aug 2024 16:13) (100% - 100%)    Parameters below as of 27 Aug 2024 16:13  Patient On (Oxygen Delivery Method): room air        Physical Exam:  General:  Appears stated age,  no distress  Eyes: EOMI, conjunctiva clear  HENT:  WNL, no JVD  Chest: no respiratory distress, no accessory muscle use  Abdomen:  noted ex lap, ostomy surgical scars. 12 Fr SPC in place, no leakage. irrigating without any difficulty   Extremities:  no pedal edema or calf tenderness noted  Skin:  No rash      LABS:                        13.3   9.26  )-----------( 320      ( 27 Aug 2024 16:30 )             40.2     08-27    137  |  102  |  8   ----------------------------<  123<H>  3.9   |  23  |  0.88    Ca    10.0      27 Aug 2024 16:30    TPro  10.1<H>  /  Alb  4.0  /  TBili  0.3  /  DBili  x   /  AST  25  /  ALT  29  /  AlkPhos  101  08-27      Urinalysis Basic - ( 27 Aug 2024 16:30 )    Color: x / Appearance: x / SG: x / pH: x  Gluc: 123 mg/dL / Ketone: x  / Bili: x / Urobili: x   Blood: x / Protein: x / Nitrite: x   Leuk Esterase: x / RBC: x / WBC x   Sq Epi: x / Non Sq Epi: x / Bacteria: x          A/P:  35 yo M  pmhx of paraplegia from [facility], s/p IR SPC on 7/5/24 sent in from NH with malfunction of SPC.  SPC not leaking, irrigating without difficulty  pt is febrile, tachycardic, without leukocytosis  -f/u UA and Ucx  -recommend antibiotics        Patient is a 34y old  Male who presents with a chief complaint of     HPI: 33 yo M  pmhx of paraplegia from [facility]. Hx of nec fasc, ex lap with creation of ostomy. reversal of ostomy, had chronic wilson s/p IR SPC placed 7/5/24 sent here for SPC malfunction. as per pt leaking for 2 weeks, was irrigating 3 days ago but RN was unable to do so past few days. pt endorses subjective fevers chills.       PAST MEDICAL & SURGICAL HISTORY:  Paraplegia      Colostomy in place      VTE (venous thromboembolism)      Colostomy present      S/P IVC filter          Review of Systems:  Negative except  as above in HPI    MEDICATIONS  (STANDING):    MEDICATIONS  (PRN):      Allergies    Lidocaine 4% Patch (Blisters)    Intolerances    lactose (Diarrhea)      SOCIAL HISTORY           FAMILY HISTORY:  No pertinent family history in first degree relatives        Vital Signs Last 24 Hrs  T(C): 37.7 (27 Aug 2024 16:13), Max: 38 (27 Aug 2024 14:46)  T(F): 99.8 (27 Aug 2024 16:13), Max: 100.4 (27 Aug 2024 14:46)  HR: 112 (27 Aug 2024 16:13) (110 - 112)  BP: 189/126 (27 Aug 2024 16:13) (189/126 - 210/146)  BP(mean): --  RR: 19 (27 Aug 2024 16:13) (18 - 19)  SpO2: 100% (27 Aug 2024 16:13) (100% - 100%)    Parameters below as of 27 Aug 2024 16:13  Patient On (Oxygen Delivery Method): room air        Physical Exam:  General:  Appears stated age,  no distress  Eyes: EOMI, conjunctiva clear  HENT:  WNL, no JVD  Chest: no respiratory distress, no accessory muscle use  Abdomen:  noted ex lap, ostomy surgical scars. 12 Fr SPC in place, no leakage. irrigating without any difficulty   Extremities:  no pedal edema or calf tenderness noted  Skin:  No rash      LABS:                        13.3   9.26  )-----------( 320      ( 27 Aug 2024 16:30 )             40.2     08-27    137  |  102  |  8   ----------------------------<  123<H>  3.9   |  23  |  0.88    Ca    10.0      27 Aug 2024 16:30    TPro  10.1<H>  /  Alb  4.0  /  TBili  0.3  /  DBili  x   /  AST  25  /  ALT  29  /  AlkPhos  101  08-27      Urinalysis Basic - ( 27 Aug 2024 16:30 )    Color: x / Appearance: x / SG: x / pH: x  Gluc: 123 mg/dL / Ketone: x  / Bili: x / Urobili: x   Blood: x / Protein: x / Nitrite: x   Leuk Esterase: x / RBC: x / WBC x   Sq Epi: x / Non Sq Epi: x / Bacteria: x          A/P:  33 yo M  pmhx of paraplegia from [facility], s/p IR SPC on 7/5/24 sent in from NH with malfunction of SPC.  SPC not leaking, irrigating without difficulty  pt is febrile, tachycardic, without leukocytosis  -f/u UA and Ucx  -recommend medical admission for IV antibiotics  -case discussed with Dr Garcia        Patient is a 34y old  Male who presents with a chief complaint of     HPI: 33 yo M  pmhx of paraplegia from [facility]. Hx of nec fasc, ex lap with creation of ostomy. reversal of ostomy, had chronic wilson s/p IR SPC placed 7/5/24 sent here for SPC malfunction. as per pt leaking for 2 weeks, was irrigating 3 days ago but RN was unable to do so past few days. pt endorses subjective fevers chills.       PAST MEDICAL & SURGICAL HISTORY:  Paraplegia      Colostomy in place      VTE (venous thromboembolism)      Colostomy present      S/P IVC filter          Review of Systems:  Negative except  as above in HPI    MEDICATIONS  (STANDING):    MEDICATIONS  (PRN):      Allergies    Lidocaine 4% Patch (Blisters)    Intolerances    lactose (Diarrhea)      SOCIAL HISTORY           FAMILY HISTORY:  No pertinent family history in first degree relatives        Vital Signs Last 24 Hrs  T(C): 37.7 (27 Aug 2024 16:13), Max: 38 (27 Aug 2024 14:46)  T(F): 99.8 (27 Aug 2024 16:13), Max: 100.4 (27 Aug 2024 14:46)  HR: 112 (27 Aug 2024 16:13) (110 - 112)  BP: 189/126 (27 Aug 2024 16:13) (189/126 - 210/146)  BP(mean): --  RR: 19 (27 Aug 2024 16:13) (18 - 19)  SpO2: 100% (27 Aug 2024 16:13) (100% - 100%)    Parameters below as of 27 Aug 2024 16:13  Patient On (Oxygen Delivery Method): room air        Physical Exam:  General:  Appears stated age,  no distress  Eyes: EOMI, conjunctiva clear  HENT:  WNL, no JVD  Chest: no respiratory distress, no accessory muscle use  Abdomen:  noted ex lap, ostomy surgical scars. 12 Fr SPC in place, no leakage. irrigating without any difficulty   Extremities:  no pedal edema or calf tenderness noted  Skin:  No rash      LABS:                        13.3   9.26  )-----------( 320      ( 27 Aug 2024 16:30 )             40.2     08-27    137  |  102  |  8   ----------------------------<  123<H>  3.9   |  23  |  0.88    Ca    10.0      27 Aug 2024 16:30    TPro  10.1<H>  /  Alb  4.0  /  TBili  0.3  /  DBili  x   /  AST  25  /  ALT  29  /  AlkPhos  101  08-27      Urinalysis Basic - ( 27 Aug 2024 16:30 )    Color: x / Appearance: x / SG: x / pH: x  Gluc: 123 mg/dL / Ketone: x  / Bili: x / Urobili: x   Blood: x / Protein: x / Nitrite: x   Leuk Esterase: x / RBC: x / WBC x   Sq Epi: x / Non Sq Epi: x / Bacteria: x          A/P:  33 yo M  pmhx of paraplegia from [facility], s/p IR SPC on 7/5/24 sent in from NH with malfunction of SPC.  SPC not leaking, irrigating without difficulty  pt is febrile, tachycardic, without leukocytosis  -f/u UA and Ucx  -recommend medical admission for IV antibiotics  -reccommend IR consult tm for SPC exchange and upsizing   -case discussed with Dr Garcia

## 2024-08-27 NOTE — ED ADULT NURSE NOTE - ED STAT RN HANDOFF DETAILS
Report given to ZAK Marquez on 1C. Pt awake and alert. Pt A&O x4. Pt on continuos cardiac monitoring. No s/s of acute distress noted. IV intact and patent.

## 2024-08-27 NOTE — ED PROVIDER NOTE - PHYSICAL EXAMINATION
General appearance: Nontoxic appearing, conversant, afebrile    Eyes: anicteric sclerae, MAICOL, EOMI   HENT: Atraumatic; oropharynx clear, MMM and no ulcerations, no pharyngeal erythema or exudate   Neck: Trachea midline; Full range of motion, supple   Pulm: CTA bl, normal respiratory effort and no intercostal retractions, normal work of breathing   CV: Tachycardic, regular, No murmurs, rubs, or gallops   Abdomen: Soft, suprapubic tender, non-distended; no guarding or rebound   Extremities: No peripheral edema, no gross deformities, FROM x4   Skin: Dry, normal temperature, turgor and texture; no rash   Psych: Appropriate affect, cooperative

## 2024-08-28 DIAGNOSIS — N39.0 URINARY TRACT INFECTION, SITE NOT SPECIFIED: ICD-10-CM

## 2024-08-28 DIAGNOSIS — G82.21 PARAPLEGIA, COMPLETE: ICD-10-CM

## 2024-08-28 DIAGNOSIS — T83.010A BREAKDOWN (MECHANICAL) OF CYSTOSTOMY CATHETER, INITIAL ENCOUNTER: ICD-10-CM

## 2024-08-28 DIAGNOSIS — R03.0 ELEVATED BLOOD-PRESSURE READING, WITHOUT DIAGNOSIS OF HYPERTENSION: ICD-10-CM

## 2024-08-28 LAB
A1C WITH ESTIMATED AVERAGE GLUCOSE RESULT: 6 % — HIGH (ref 4–5.6)
ALBUMIN SERPL ELPH-MCNC: 3.3 G/DL — SIGNIFICANT CHANGE UP (ref 3.3–5)
ALP SERPL-CCNC: 83 U/L — SIGNIFICANT CHANGE UP (ref 40–120)
ALT FLD-CCNC: 24 U/L — SIGNIFICANT CHANGE UP (ref 12–78)
ANION GAP SERPL CALC-SCNC: 9 MMOL/L — SIGNIFICANT CHANGE UP (ref 5–17)
AST SERPL-CCNC: 21 U/L — SIGNIFICANT CHANGE UP (ref 15–37)
BILIRUB SERPL-MCNC: 0.5 MG/DL — SIGNIFICANT CHANGE UP (ref 0.2–1.2)
BUN SERPL-MCNC: 7 MG/DL — SIGNIFICANT CHANGE UP (ref 7–23)
CALCIUM SERPL-MCNC: 9.3 MG/DL — SIGNIFICANT CHANGE UP (ref 8.5–10.1)
CHLORIDE SERPL-SCNC: 105 MMOL/L — SIGNIFICANT CHANGE UP (ref 96–108)
CHOLEST SERPL-MCNC: 129 MG/DL — SIGNIFICANT CHANGE UP
CO2 SERPL-SCNC: 23 MMOL/L — SIGNIFICANT CHANGE UP (ref 22–31)
CREAT SERPL-MCNC: 0.54 MG/DL — SIGNIFICANT CHANGE UP (ref 0.5–1.3)
EGFR: 135 ML/MIN/1.73M2 — SIGNIFICANT CHANGE UP
ESTIMATED AVERAGE GLUCOSE: 126 MG/DL — HIGH (ref 68–114)
GLUCOSE SERPL-MCNC: 99 MG/DL — SIGNIFICANT CHANGE UP (ref 70–99)
HCT VFR BLD CALC: 35 % — LOW (ref 39–50)
HDLC SERPL-MCNC: 37 MG/DL — LOW
HGB BLD-MCNC: 11.3 G/DL — LOW (ref 13–17)
LIPID PNL WITH DIRECT LDL SERPL: 77 MG/DL — SIGNIFICANT CHANGE UP
MCHC RBC-ENTMCNC: 26.4 PG — LOW (ref 27–34)
MCHC RBC-ENTMCNC: 32.3 G/DL — SIGNIFICANT CHANGE UP (ref 32–36)
MCV RBC AUTO: 81.8 FL — SIGNIFICANT CHANGE UP (ref 80–100)
NON HDL CHOLESTEROL: 92 MG/DL — SIGNIFICANT CHANGE UP
NRBC # BLD: 0 /100 WBCS — SIGNIFICANT CHANGE UP (ref 0–0)
PLATELET # BLD AUTO: 304 K/UL — SIGNIFICANT CHANGE UP (ref 150–400)
POTASSIUM SERPL-MCNC: 3.5 MMOL/L — SIGNIFICANT CHANGE UP (ref 3.5–5.3)
POTASSIUM SERPL-SCNC: 3.5 MMOL/L — SIGNIFICANT CHANGE UP (ref 3.5–5.3)
PROT SERPL-MCNC: 8.5 GM/DL — HIGH (ref 6–8.3)
RBC # BLD: 4.28 M/UL — SIGNIFICANT CHANGE UP (ref 4.2–5.8)
RBC # FLD: 13.3 % — SIGNIFICANT CHANGE UP (ref 10.3–14.5)
SODIUM SERPL-SCNC: 137 MMOL/L — SIGNIFICANT CHANGE UP (ref 135–145)
TRIGL SERPL-MCNC: 74 MG/DL — SIGNIFICANT CHANGE UP
WBC # BLD: 6.66 K/UL — SIGNIFICANT CHANGE UP (ref 3.8–10.5)
WBC # FLD AUTO: 6.66 K/UL — SIGNIFICANT CHANGE UP (ref 3.8–10.5)

## 2024-08-28 PROCEDURE — 99232 SBSQ HOSP IP/OBS MODERATE 35: CPT

## 2024-08-28 RX ORDER — SODIUM CHLORIDE 9 MG/ML
1000 INJECTION INTRAMUSCULAR; INTRAVENOUS; SUBCUTANEOUS
Refills: 0 | Status: DISCONTINUED | OUTPATIENT
Start: 2024-08-28 | End: 2024-08-28

## 2024-08-28 RX ORDER — CYCLOBENZAPRINE HCL 10 MG
10 TABLET ORAL EVERY 8 HOURS
Refills: 0 | Status: DISCONTINUED | OUTPATIENT
Start: 2024-08-28 | End: 2024-08-30

## 2024-08-28 RX ORDER — ONDANSETRON 2 MG/ML
4 INJECTION, SOLUTION INTRAMUSCULAR; INTRAVENOUS EVERY 8 HOURS
Refills: 0 | Status: DISCONTINUED | OUTPATIENT
Start: 2024-08-28 | End: 2024-08-30

## 2024-08-28 RX ORDER — HYDRALAZINE HCL 50 MG
10 TABLET ORAL ONCE
Refills: 0 | Status: COMPLETED | OUTPATIENT
Start: 2024-08-28 | End: 2024-08-28

## 2024-08-28 RX ORDER — CYCLOBENZAPRINE HCL 10 MG
10 TABLET ORAL EVERY 8 HOURS
Refills: 0 | Status: DISCONTINUED | OUTPATIENT
Start: 2024-08-28 | End: 2024-08-28

## 2024-08-28 RX ORDER — MAGNESIUM, ALUMINUM HYDROXIDE 200-225/5
30 SUSPENSION, ORAL (FINAL DOSE FORM) ORAL EVERY 4 HOURS
Refills: 0 | Status: DISCONTINUED | OUTPATIENT
Start: 2024-08-28 | End: 2024-08-30

## 2024-08-28 RX ORDER — ACETAMINOPHEN 325 MG/1
650 TABLET ORAL EVERY 6 HOURS
Refills: 0 | Status: DISCONTINUED | OUTPATIENT
Start: 2024-08-28 | End: 2024-08-30

## 2024-08-28 RX ADMIN — Medication 100 MILLIGRAM(S): at 05:32

## 2024-08-28 RX ADMIN — OXYCODONE HYDROCHLORIDE 10 MILLIGRAM(S): 5 TABLET ORAL at 15:16

## 2024-08-28 RX ADMIN — Medication 1 TABLET(S): at 17:56

## 2024-08-28 RX ADMIN — Medication 10 MILLIGRAM(S): at 19:59

## 2024-08-28 RX ADMIN — Medication 325 MILLIGRAM(S): at 13:30

## 2024-08-28 RX ADMIN — APIXABAN 5 MILLIGRAM(S): 5 TABLET, FILM COATED ORAL at 17:56

## 2024-08-28 RX ADMIN — Medication 100 MILLIGRAM(S): at 13:30

## 2024-08-28 RX ADMIN — Medication 40 MILLIGRAM(S): at 21:01

## 2024-08-28 RX ADMIN — Medication 10 MILLIGRAM(S): at 01:19

## 2024-08-28 RX ADMIN — OXYCODONE HYDROCHLORIDE 10 MILLIGRAM(S): 5 TABLET ORAL at 22:02

## 2024-08-28 RX ADMIN — OXYCODONE HYDROCHLORIDE 10 MILLIGRAM(S): 5 TABLET ORAL at 01:00

## 2024-08-28 RX ADMIN — SODIUM CHLORIDE 125 MILLILITER(S): 9 INJECTION INTRAMUSCULAR; INTRAVENOUS; SUBCUTANEOUS at 02:03

## 2024-08-28 RX ADMIN — APIXABAN 5 MILLIGRAM(S): 5 TABLET, FILM COATED ORAL at 05:32

## 2024-08-28 RX ADMIN — SODIUM CHLORIDE 125 MILLILITER(S): 9 INJECTION INTRAMUSCULAR; INTRAVENOUS; SUBCUTANEOUS at 01:19

## 2024-08-28 RX ADMIN — OXYCODONE HYDROCHLORIDE 10 MILLIGRAM(S): 5 TABLET ORAL at 14:40

## 2024-08-28 RX ADMIN — Medication 100 MILLIGRAM(S): at 20:11

## 2024-08-28 RX ADMIN — FAMOTIDINE 20 MILLIGRAM(S): 10 INJECTION INTRAVENOUS at 13:30

## 2024-08-28 RX ADMIN — LAMOTRIGINE 25 MILLIGRAM(S): 100 TABLET, EXTENDED RELEASE ORAL at 13:30

## 2024-08-28 RX ADMIN — OXYCODONE HYDROCHLORIDE 10 MILLIGRAM(S): 5 TABLET ORAL at 21:02

## 2024-08-28 RX ADMIN — Medication 10 MILLIGRAM(S): at 01:06

## 2024-08-28 RX ADMIN — OXYCODONE HYDROCHLORIDE 10 MILLIGRAM(S): 5 TABLET ORAL at 09:43

## 2024-08-28 RX ADMIN — Medication 60 MILLIGRAM(S): at 14:40

## 2024-08-28 RX ADMIN — OXYCODONE HYDROCHLORIDE 10 MILLIGRAM(S): 5 TABLET ORAL at 08:43

## 2024-08-28 RX ADMIN — Medication 1 TABLET(S): at 05:32

## 2024-08-28 RX ADMIN — Medication 100 MILLIGRAM(S): at 21:01

## 2024-08-28 NOTE — H&P ADULT - PROBLEM SELECTOR PLAN 1
Suprapubic Catheter place by IR 7/5/2024  2 weeks history of SPC leakage & abdominal pain   Catheter Irrigated by Urology in the ED     - IR consult for catheter size change   - Monitor Urine output
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
## 2024-08-28 NOTE — PATIENT PROFILE ADULT - FALL HARM RISK - HARM RISK INTERVENTIONS

## 2024-08-28 NOTE — PATIENT PROFILE ADULT - FUNCTIONAL ASSESSMENT - BASIC MOBILITY 6.
Telemetry Bed?: No   Admitting Physician: Ana Laura Man [[id number]]   Observation Unit Appropriate?: Yes   Is this a telephone or verbal order?: This is a telephone order from the admitting physician 1 = Total assistance

## 2024-08-28 NOTE — PATIENT PROFILE ADULT - NSPRESCRUSEDDRG_GEN_A_NUR
Medication(s) Requested: amphetamine-dextroamphetamine XR (Adderall XR) 15 MG 24 hr capsule  Last office visit: 1/25/2023  Last refill: 6/1/2023  Is the patient due for refill of this medication(s): Yes  PDMP review: Criteria met. Forwarded to Physician/CHRISTOPHER for signature.     Patient has a scheduled appointment 7/12/2023 with Dr. Ramsay.    
Preferred pharmacy verified and updated.    Pt advised to check with pharmacy first before picking up prescriptions.      Pt advised their refill will be addressed within 72 business hours.    Please call patient back if any questions, comments or concerns.    Telephone:  [phone]        
No

## 2024-08-28 NOTE — CHART NOTE - NSCHARTNOTEFT_GEN_A_CORE
SPC functioning well.   no urological interventions required at this time  discussed with Dr. Garcia  recall prn

## 2024-08-28 NOTE — H&P ADULT - PROBLEM SELECTOR PLAN 2
UA – Moderate Leukocyte Esterase, Many bacteria, WBC:10.  Received 2L- NS   - Ceftriaxone   - Tylenol for fever   - DVT Prophylaxis

## 2024-08-28 NOTE — H&P ADULT - NSHPLABSRESULTS_GEN_ALL_CORE
13.3   9.26  )-----------( 320      ( 27 Aug 2024 16:30 )             40.2     137  |  102  |  8   ----------------------------<  123<H>       3.9   |  23  |  0.88    Ca    10.0      27 Aug 2024 16:30    TPro  10.1<H>  /  Alb  4.0  /  TBili  0.3  /  DBili  x   /  AST  25  /  ALT  29  /  AlkPhos  101        Urinalysis Basic - ( 27 Aug 2024 17:31 )  Color: Red / Appearance: Turbid / S.016 / pH: 7.0  Gluc: Negative mg/dL / Ketone: Negative mg/dL  / Bili: Negative / Urobili: 1.0 mg/dL   Blood: Large / Protein: 300 mg/dL / Nitrite: Negative   Leuk Esterase: Moderate / RBC: Too Numerous to count /HPF / WBC 10 /HPF   Sq Epi: x / Bacteria: Many /HPF  Hyaline Casts: x/WBC Casts: x      CT-ABD  LOWER CHEST: Left gynecomastia. Elevated right hemidiaphragm.    LIVER: Normal size. Fatty infiltration of liver..  BILE DUCTS: Normal caliber.  GALLBLADDER: Within normal limits.  SPLEEN: Within normal limits.  PANCREAS: Within normal limits.  ADRENALS: Within normal limits.  KIDNEYS/URETERS: Within normal limits. No hydronephrosis. No evidence of acute pyelonephritis.    BLADDER: Partially decompressed by suprapubic catheter.  REPRODUCTIVE ORGANS: Unremarkable seminal vesicles. The prostate measures approximately 4.1 x 4.8 x 3.1 cm    BOWEL: Limited evaluation of bowel due to lack of oral contrast material. No bowel obstruction or ileus. Status post sigmoid resection. Colon loaded with fecal material. No evidence of colitis or colonic diverticulitis  PERITONEUM/RETROPERITONEUM: Within normal limits.  VESSELS: Infrarenal IVC filter. Flat IVC.  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  LYMPH NODES: No lymphadenopathy.  ABDOMINAL WALL: Diastasis of the left rectus abdominous infraumbilical..  BONES: Absent distal sacrum and coccyx. Left proximal femoral Edwards pin. Deformed left femoral head. Abnormal left hip. Bilateral deformed ischial tuberosities.    IMPRESSION:  No bowel obstruction.  The bladder is decompressed by a suprapubic catheter.  No hydronephrosis
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
## 2024-08-28 NOTE — H&P ADULT - PROBLEM SELECTOR PLAN 3
Upon ED arrival BP: 210/146  Upon evalutaion SBP: 186  - Hydralazine 10mg IV  GEORGE  - Consider PO meds if remains elevated   - Monitor BP

## 2024-08-29 ENCOUNTER — TRANSCRIPTION ENCOUNTER (OUTPATIENT)
Age: 34
End: 2024-08-29

## 2024-08-29 LAB
ANION GAP SERPL CALC-SCNC: 5 MMOL/L — SIGNIFICANT CHANGE UP (ref 5–17)
BUN SERPL-MCNC: 7 MG/DL — SIGNIFICANT CHANGE UP (ref 7–23)
CALCIUM SERPL-MCNC: 9.4 MG/DL — SIGNIFICANT CHANGE UP (ref 8.5–10.1)
CHLORIDE SERPL-SCNC: 105 MMOL/L — SIGNIFICANT CHANGE UP (ref 96–108)
CO2 SERPL-SCNC: 23 MMOL/L — SIGNIFICANT CHANGE UP (ref 22–31)
CREAT SERPL-MCNC: 0.53 MG/DL — SIGNIFICANT CHANGE UP (ref 0.5–1.3)
CULTURE RESULTS: SIGNIFICANT CHANGE UP
EGFR: 136 ML/MIN/1.73M2 — SIGNIFICANT CHANGE UP
GLUCOSE SERPL-MCNC: 120 MG/DL — HIGH (ref 70–99)
HCT VFR BLD CALC: 36.3 % — LOW (ref 39–50)
HGB BLD-MCNC: 11.6 G/DL — LOW (ref 13–17)
MCHC RBC-ENTMCNC: 26.2 PG — LOW (ref 27–34)
MCHC RBC-ENTMCNC: 32 G/DL — SIGNIFICANT CHANGE UP (ref 32–36)
MCV RBC AUTO: 81.9 FL — SIGNIFICANT CHANGE UP (ref 80–100)
NRBC # BLD: 0 /100 WBCS — SIGNIFICANT CHANGE UP (ref 0–0)
PLATELET # BLD AUTO: 251 K/UL — SIGNIFICANT CHANGE UP (ref 150–400)
POTASSIUM SERPL-MCNC: 3.9 MMOL/L — SIGNIFICANT CHANGE UP (ref 3.5–5.3)
POTASSIUM SERPL-SCNC: 3.9 MMOL/L — SIGNIFICANT CHANGE UP (ref 3.5–5.3)
RBC # BLD: 4.43 M/UL — SIGNIFICANT CHANGE UP (ref 4.2–5.8)
RBC # FLD: 13.2 % — SIGNIFICANT CHANGE UP (ref 10.3–14.5)
SODIUM SERPL-SCNC: 133 MMOL/L — LOW (ref 135–145)
SPECIMEN SOURCE: SIGNIFICANT CHANGE UP
WBC # BLD: 5.23 K/UL — SIGNIFICANT CHANGE UP (ref 3.8–10.5)
WBC # FLD AUTO: 5.23 K/UL — SIGNIFICANT CHANGE UP (ref 3.8–10.5)

## 2024-08-29 PROCEDURE — 99232 SBSQ HOSP IP/OBS MODERATE 35: CPT

## 2024-08-29 RX ADMIN — Medication 1 TABLET(S): at 18:46

## 2024-08-29 RX ADMIN — OXYCODONE HYDROCHLORIDE 10 MILLIGRAM(S): 5 TABLET ORAL at 09:16

## 2024-08-29 RX ADMIN — Medication 10 MILLIGRAM(S): at 05:40

## 2024-08-29 RX ADMIN — Medication 10 MILLIGRAM(S): at 15:19

## 2024-08-29 RX ADMIN — Medication 100 MILLIGRAM(S): at 14:09

## 2024-08-29 RX ADMIN — LAMOTRIGINE 25 MILLIGRAM(S): 100 TABLET, EXTENDED RELEASE ORAL at 14:04

## 2024-08-29 RX ADMIN — FAMOTIDINE 20 MILLIGRAM(S): 10 INJECTION INTRAVENOUS at 14:04

## 2024-08-29 RX ADMIN — OXYCODONE HYDROCHLORIDE 10 MILLIGRAM(S): 5 TABLET ORAL at 04:01

## 2024-08-29 RX ADMIN — APIXABAN 5 MILLIGRAM(S): 5 TABLET, FILM COATED ORAL at 05:39

## 2024-08-29 RX ADMIN — APIXABAN 5 MILLIGRAM(S): 5 TABLET, FILM COATED ORAL at 18:46

## 2024-08-29 RX ADMIN — OXYCODONE HYDROCHLORIDE 10 MILLIGRAM(S): 5 TABLET ORAL at 10:16

## 2024-08-29 RX ADMIN — Medication 60 MILLIGRAM(S): at 14:04

## 2024-08-29 RX ADMIN — OXYCODONE HYDROCHLORIDE 10 MILLIGRAM(S): 5 TABLET ORAL at 18:45

## 2024-08-29 RX ADMIN — Medication 325 MILLIGRAM(S): at 14:04

## 2024-08-29 RX ADMIN — Medication 1 TABLET(S): at 05:39

## 2024-08-29 RX ADMIN — OXYCODONE HYDROCHLORIDE 10 MILLIGRAM(S): 5 TABLET ORAL at 03:01

## 2024-08-29 RX ADMIN — Medication 3 MILLIGRAM(S): at 23:40

## 2024-08-29 RX ADMIN — Medication 100 MILLIGRAM(S): at 05:40

## 2024-08-29 RX ADMIN — Medication 100 MILLIGRAM(S): at 21:40

## 2024-08-29 RX ADMIN — Medication 40 MILLIGRAM(S): at 21:40

## 2024-08-29 NOTE — DISCHARGE NOTE PROVIDER - NSDCCPCAREPLAN_GEN_ALL_CORE_FT
PRINCIPAL DISCHARGE DIAGNOSIS  Diagnosis: Suprapubic catheter dysfunction, initial encounter  Assessment and Plan of Treatment:       SECONDARY DISCHARGE DIAGNOSES  Diagnosis: Blood pressure elevated without history of HTN  Assessment and Plan of Treatment:     Diagnosis: Chronic complete spastic paraplegia  Assessment and Plan of Treatment:     Diagnosis: Acute UTI  Assessment and Plan of Treatment:

## 2024-08-29 NOTE — DISCHARGE NOTE PROVIDER - NSDCMRMEDTOKEN_GEN_ALL_CORE_FT
Colace 100 mg oral capsule: 2 cap(s) orally 2 times a day  cyclobenzaprine 10 mg oral tablet: 1 tab(s) orally every 6 hours  DULoxetine 60 mg oral delayed release capsule: 1 cap(s) orally once a day  Eliquis 5 mg oral tablet: 1 tab(s) orally 2 times a day  ferrous sulfate: 1 once a day  LaMICtal 25 mg oral tablet: 1 tab(s) orally once a day  Lipitor 40 mg oral tablet: 1 tab(s) orally once a day  Melatonin 3 mg oral tablet: 1 tab(s) orally once a day (at bedtime)  Neurontin 100 mg oral capsule: 1 cap(s) orally every 8 hours  oxyCODONE 10 mg oral tablet: 1 tab(s) orally every 6 hours as needed for  severe pain  Pepcid 20 mg oral tablet: 1 tab(s) orally once a day  Senna-gen 8.6 mg oral tablet: 1 tab(s) orally 2 times a day   Colace 100 mg oral capsule: 2 cap(s) orally 2 times a day  cyclobenzaprine 10 mg oral tablet: 1 tab(s) orally every 6 hours  DULoxetine 60 mg oral delayed release capsule: 1 cap(s) orally once a day  Eliquis 5 mg oral tablet: 1 tab(s) orally 2 times a day  ferrous sulfate 325 mg (65 mg elemental iron) oral tablet: 1 tab(s) orally once a day  LaMICtal 25 mg oral tablet: 1 tab(s) orally once a day  Lipitor 40 mg oral tablet: 1 tab(s) orally once a day  Melatonin 3 mg oral tablet: 1 tab(s) orally once a day (at bedtime)  Neurontin 100 mg oral capsule: 1 cap(s) orally every 8 hours  oxyCODONE 10 mg oral tablet: 1 tab(s) orally every 6 hours as needed for  severe pain  Pepcid 20 mg oral tablet: 1 tab(s) orally once a day  Senna-gen 8.6 mg oral tablet: 1 tab(s) orally 2 times a day
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
## 2024-08-29 NOTE — DISCHARGE NOTE PROVIDER - NSDCFUADDINST_GEN_ALL_CORE_FT
It is important to see your primary physician as well as any specialty physicians within the next week to perform a comprehensive medical review.  Call their offices for an appointment as soon as you leave the hospital.  You will also need to see them for renewal of your medications.  If have any difficulty following with a physician, contact the [facility] [phone] or via [email or web address].   To obtain your results, you can access the [email or web address] Patient Portal at [email or web address].  Your medical issues appear to be stable at this time, but if your symptoms recur or worsen, contact your physicians and/or return to the hospital if necessary.  If you encounter any issues or questions with your medication, call your physicians before stopping the medication.  Do not drive.  Limit your diet to 2 grams of sodium daily.

## 2024-08-30 ENCOUNTER — TRANSCRIPTION ENCOUNTER (OUTPATIENT)
Age: 34
End: 2024-08-30

## 2024-08-30 VITALS
HEART RATE: 88 BPM | TEMPERATURE: 98 F | OXYGEN SATURATION: 100 % | RESPIRATION RATE: 18 BRPM | DIASTOLIC BLOOD PRESSURE: 103 MMHG | SYSTOLIC BLOOD PRESSURE: 167 MMHG

## 2024-08-30 PROCEDURE — 99239 HOSP IP/OBS DSCHRG MGMT >30: CPT

## 2024-08-30 RX ORDER — FERROUS SULFATE 325(65) MG
1 TABLET ORAL
Qty: 0 | Refills: 0 | DISCHARGE
Start: 2024-08-30

## 2024-08-30 RX ADMIN — OXYCODONE HYDROCHLORIDE 10 MILLIGRAM(S): 5 TABLET ORAL at 21:38

## 2024-08-30 RX ADMIN — Medication 325 MILLIGRAM(S): at 13:53

## 2024-08-30 RX ADMIN — OXYCODONE HYDROCHLORIDE 10 MILLIGRAM(S): 5 TABLET ORAL at 01:32

## 2024-08-30 RX ADMIN — OXYCODONE HYDROCHLORIDE 10 MILLIGRAM(S): 5 TABLET ORAL at 09:56

## 2024-08-30 RX ADMIN — LAMOTRIGINE 25 MILLIGRAM(S): 100 TABLET, EXTENDED RELEASE ORAL at 13:53

## 2024-08-30 RX ADMIN — Medication 1 TABLET(S): at 06:38

## 2024-08-30 RX ADMIN — Medication 100 MILLIGRAM(S): at 21:14

## 2024-08-30 RX ADMIN — OXYCODONE HYDROCHLORIDE 10 MILLIGRAM(S): 5 TABLET ORAL at 08:56

## 2024-08-30 RX ADMIN — OXYCODONE HYDROCHLORIDE 10 MILLIGRAM(S): 5 TABLET ORAL at 02:30

## 2024-08-30 RX ADMIN — ACETAMINOPHEN 650 MILLIGRAM(S): 325 TABLET ORAL at 14:51

## 2024-08-30 RX ADMIN — FAMOTIDINE 20 MILLIGRAM(S): 10 INJECTION INTRAVENOUS at 13:54

## 2024-08-30 RX ADMIN — Medication 100 MILLIGRAM(S): at 13:51

## 2024-08-30 RX ADMIN — APIXABAN 5 MILLIGRAM(S): 5 TABLET, FILM COATED ORAL at 06:38

## 2024-08-30 RX ADMIN — ACETAMINOPHEN 650 MILLIGRAM(S): 325 TABLET ORAL at 13:51

## 2024-08-30 RX ADMIN — Medication 40 MILLIGRAM(S): at 21:14

## 2024-08-30 RX ADMIN — OXYCODONE HYDROCHLORIDE 10 MILLIGRAM(S): 5 TABLET ORAL at 15:31

## 2024-08-30 RX ADMIN — APIXABAN 5 MILLIGRAM(S): 5 TABLET, FILM COATED ORAL at 19:04

## 2024-08-30 RX ADMIN — Medication 100 MILLIGRAM(S): at 06:38

## 2024-08-30 RX ADMIN — Medication 60 MILLIGRAM(S): at 13:53

## 2024-08-30 NOTE — PROGRESS NOTE ADULT - SUBJECTIVE AND OBJECTIVE BOX
Patient: GALE OSUNA [id number] 34y Male                            Hospitalist Attending Note    Feels well.  No complaints.  SPC functioning without leakage.    No fever /c hills.      ____________________PHYSICAL EXAM:  GENERAL:  NAD Alert and Oriented x 3   HEENT: NCAT  CARDIOVASCULAR:  S1, S2  LUNGS: CTAB  ABDOMEN:  soft, (-) tenderness, (-) distension, (+) bowel sounds, (-) guarding, (-) rebound (-) rigidity.  Suprapubic catheter in place.   EXTREMITIES:  no cyanosis / clubbing / edema.   ____________________     VITALS:  Vital Signs Last 24 Hrs  T(C): 36.6 (28 Aug 2024 17:12), Max: 37.3 (27 Aug 2024 20:28)  T(F): 97.8 (28 Aug 2024 17:12), Max: 99.1 (27 Aug 2024 20:28)  HR: 84 (28 Aug 2024 17:12) (77 - 98)  BP: 130/84 (28 Aug 2024 17:12) (114/66 - 186/133)  BP(mean): --  RR: 20 (28 Aug 2024 06:31) (16 - 23)  SpO2: 99% (28 Aug 2024 17:12) (98% - 100%)    Parameters below as of 28 Aug 2024 01:58  Patient On (Oxygen Delivery Method): room air     Daily     Daily   CAPILLARY BLOOD GLUCOSE        I&O's Summary    27 Aug 2024 07:01  -  28 Aug 2024 07:00  --------------------------------------------------------  IN: 0 mL / OUT: 2600 mL / NET: -2600 mL    28 Aug 2024 07:01  -  28 Aug 2024 18:42  --------------------------------------------------------  IN: 0 mL / OUT: 725 mL / NET: -725 mL        HISTORY:  PAST MEDICAL & SURGICAL HISTORY:  Paraplegia      Colostomy in place      VTE (venous thromboembolism)      Colostomy present      S/P IVC filter      Allergies    Lidocaine 4% Patch (Blisters)    Intolerances    lactose (Diarrhea)     LABS:                        11.3   6.66  )-----------( 304      ( 28 Aug 2024 06:50 )             35.0     08-28    137  |  105  |  7   ----------------------------<  99  3.5   |  23  |  0.54    Ca    9.3      28 Aug 2024 06:50    TPro  8.5<H>  /  Alb  3.3  /  TBili  0.5  /  DBili  x   /  AST  21  /  ALT  24  /  AlkPhos  83  08-28      LIVER FUNCTIONS - ( 28 Aug 2024 06:50 )  Alb: 3.3 g/dL / Pro: 8.5 gm/dL / ALK PHOS: 83 U/L / ALT: 24 U/L / AST: 21 U/L / GGT: x           Urinalysis Basic - ( 28 Aug 2024 06:50 )    Color: x / Appearance: x / SG: x / pH: x  Gluc: 99 mg/dL / Ketone: x  / Bili: x / Urobili: x   Blood: x / Protein: x / Nitrite: x   Leuk Esterase: x / RBC: x / WBC x   Sq Epi: x / Non Sq Epi: x / Bacteria: x              MEDICATIONS:  MEDICATIONS  (STANDING):  apixaban 5 milliGRAM(s) Oral two times a day  atorvastatin 40 milliGRAM(s) Oral at bedtime  cefTRIAXone   IVPB 1000 milliGRAM(s) IV Intermittent every 24 hours  cyclobenzaprine 10 milliGRAM(s) Oral every 8 hours  DULoxetine 60 milliGRAM(s) Oral daily  famotidine    Tablet 20 milliGRAM(s) Oral daily  ferrous    sulfate 325 milliGRAM(s) Oral daily  gabapentin 100 milliGRAM(s) Oral every 8 hours  lamoTRIgine 25 milliGRAM(s) Oral daily  senna 1 Tablet(s) Oral two times a day    MEDICATIONS  (PRN):  acetaminophen     Tablet .. 650 milliGRAM(s) Oral every 6 hours PRN Temp greater or equal to 38C (100.4F), Mild Pain (1 - 3)  aluminum hydroxide/magnesium hydroxide/simethicone Suspension 30 milliLiter(s) Oral every 4 hours PRN Dyspepsia  melatonin 3 milliGRAM(s) Oral at bedtime PRN Insomnia  ondansetron Injectable 4 milliGRAM(s) IV Push every 8 hours PRN Nausea and/or Vomiting  oxyCODONE    IR 10 milliGRAM(s) Oral every 6 hours PRN for severe pain  
                          Patient: GALE OSUNA [id number] 34y Male                            Hospitalist Attending Note    Feels well.  No complaints.  SPC functioning without leakage.    No fever / chills.    Drop in H/H noted.      ____________________PHYSICAL EXAM:  GENERAL:  NAD Alert and Oriented x 3   HEENT: NCAT  CARDIOVASCULAR:  S1, S2  LUNGS: CTAB  ABDOMEN:  soft, (-) tenderness, (-) distension, (+) bowel sounds, (-) guarding, (-) rebound (-) rigidity.  Suprapubic catheter in place.   EXTREMITIES:  no cyanosis / clubbing / edema.   ____________________      VITALS:  Vital Signs Last 24 Hrs  T(C): 36.3 (29 Aug 2024 10:03), Max: 37 (29 Aug 2024 00:11)  T(F): 97.4 (29 Aug 2024 10:03), Max: 98.6 (29 Aug 2024 00:11)  HR: 88 (29 Aug 2024 10:03) (87 - 90)  BP: 144/89 (29 Aug 2024 10:03) (140/88 - 158/79)  BP(mean): --  RR: 18 (29 Aug 2024 10:03) (18 - 18)  SpO2: 100% (29 Aug 2024 10:03) (96% - 100%)    Parameters below as of 29 Aug 2024 10:03  Patient On (Oxygen Delivery Method): room air     Daily     Daily   CAPILLARY BLOOD GLUCOSE        I&O's Summary    28 Aug 2024 07:01  -  29 Aug 2024 07:00  --------------------------------------------------------  IN: 0 mL / OUT: 1725 mL / NET: -1725 mL        LABS:                        11.6   5.23  )-----------( 251      ( 29 Aug 2024 10:26 )             36.3     08-29    133<L>  |  105  |  7   ----------------------------<  120<H>  3.9   |  23  |  0.53    Ca    9.4      29 Aug 2024 10:26    TPro  8.5<H>  /  Alb  3.3  /  TBili  0.5  /  DBili  x   /  AST  21  /  ALT  24  /  AlkPhos  83  08-28      LIVER FUNCTIONS - ( 28 Aug 2024 06:50 )  Alb: 3.3 g/dL / Pro: 8.5 gm/dL / ALK PHOS: 83 U/L / ALT: 24 U/L / AST: 21 U/L / GGT: x           Urinalysis Basic - ( 29 Aug 2024 10:26 )    Color: x / Appearance: x / SG: x / pH: x  Gluc: 120 mg/dL / Ketone: x  / Bili: x / Urobili: x   Blood: x / Protein: x / Nitrite: x   Leuk Esterase: x / RBC: x / WBC x   Sq Epi: x / Non Sq Epi: x / Bacteria: x            Culture - Urine (collected 27 Aug 2024 17:31)  Source: Clean Catch Clean Catch (Midstream)  Final Report (29 Aug 2024 06:43):    >=3 organisms. Probable collection contamination.    Culture - Blood (collected 27 Aug 2024 16:30)  Source: .Blood Blood-Peripheral  Preliminary Report (29 Aug 2024 01:02):    No growth at 24 hours    Culture - Blood (collected 27 Aug 2024 16:25)  Source: .Blood Blood-Peripheral  Preliminary Report (29 Aug 2024 01:02):    No growth at 24 hours        MEDICATIONS:  acetaminophen     Tablet .. 650 milliGRAM(s) Oral every 6 hours PRN  aluminum hydroxide/magnesium hydroxide/simethicone Suspension 30 milliLiter(s) Oral every 4 hours PRN  apixaban 5 milliGRAM(s) Oral two times a day  atorvastatin 40 milliGRAM(s) Oral at bedtime  cyclobenzaprine 10 milliGRAM(s) Oral every 8 hours PRN  DULoxetine 60 milliGRAM(s) Oral daily  famotidine    Tablet 20 milliGRAM(s) Oral daily  ferrous    sulfate 325 milliGRAM(s) Oral daily  gabapentin 100 milliGRAM(s) Oral every 8 hours  lamoTRIgine 25 milliGRAM(s) Oral daily  melatonin 3 milliGRAM(s) Oral at bedtime PRN  ondansetron Injectable 4 milliGRAM(s) IV Push every 8 hours PRN  oxyCODONE    IR 10 milliGRAM(s) Oral every 6 hours PRN  senna 1 Tablet(s) Oral two times a day    
                          Patient: GALE OSUNA [id number] 34y Male                            Hospitalist Attending Note    Feels well.  No complaints.  SPC functioning without leakage.    No fever / chills.    Drop in H/H noted.      ____________________PHYSICAL EXAM:  GENERAL:  NAD Alert and Oriented x 3   HEENT: NCAT  CARDIOVASCULAR:  S1, S2  LUNGS: CTAB  ABDOMEN:  soft, (-) tenderness, (-) distension, (+) bowel sounds, (-) guarding, (-) rebound (-) rigidity.  Suprapubic catheter in place.   EXTREMITIES:  no cyanosis / clubbing / edema.   ____________________      VITALS:  Vital Signs Last 24 Hrs  T(C): 36.3 (29 Aug 2024 10:03), Max: 37 (29 Aug 2024 00:11)  T(F): 97.4 (29 Aug 2024 10:03), Max: 98.6 (29 Aug 2024 00:11)  HR: 88 (29 Aug 2024 10:03) (87 - 90)  BP: 144/89 (29 Aug 2024 10:03) (140/88 - 158/79)  BP(mean): --  RR: 18 (29 Aug 2024 10:03) (18 - 18)  SpO2: 100% (29 Aug 2024 10:03) (96% - 100%)    Parameters below as of 29 Aug 2024 10:03  Patient On (Oxygen Delivery Method): room air     Daily     Daily   CAPILLARY BLOOD GLUCOSE        I&O's Summary    28 Aug 2024 07:01  -  29 Aug 2024 07:00  --------------------------------------------------------  IN: 0 mL / OUT: 1725 mL / NET: -1725 mL        LABS:                        11.6   5.23  )-----------( 251      ( 29 Aug 2024 10:26 )             36.3     08-29    133<L>  |  105  |  7   ----------------------------<  120<H>  3.9   |  23  |  0.53    Ca    9.4      29 Aug 2024 10:26    TPro  8.5<H>  /  Alb  3.3  /  TBili  0.5  /  DBili  x   /  AST  21  /  ALT  24  /  AlkPhos  83  08-28      LIVER FUNCTIONS - ( 28 Aug 2024 06:50 )  Alb: 3.3 g/dL / Pro: 8.5 gm/dL / ALK PHOS: 83 U/L / ALT: 24 U/L / AST: 21 U/L / GGT: x           Urinalysis Basic - ( 29 Aug 2024 10:26 )    Color: x / Appearance: x / SG: x / pH: x  Gluc: 120 mg/dL / Ketone: x  / Bili: x / Urobili: x   Blood: x / Protein: x / Nitrite: x   Leuk Esterase: x / RBC: x / WBC x   Sq Epi: x / Non Sq Epi: x / Bacteria: x            Culture - Urine (collected 27 Aug 2024 17:31)  Source: Clean Catch Clean Catch (Midstream)  Final Report (29 Aug 2024 06:43):    >=3 organisms. Probable collection contamination.    Culture - Blood (collected 27 Aug 2024 16:30)  Source: .Blood Blood-Peripheral  Preliminary Report (29 Aug 2024 01:02):    No growth at 24 hours    Culture - Blood (collected 27 Aug 2024 16:25)  Source: .Blood Blood-Peripheral  Preliminary Report (29 Aug 2024 01:02):    No growth at 24 hours        MEDICATIONS:  acetaminophen     Tablet .. 650 milliGRAM(s) Oral every 6 hours PRN  aluminum hydroxide/magnesium hydroxide/simethicone Suspension 30 milliLiter(s) Oral every 4 hours PRN  apixaban 5 milliGRAM(s) Oral two times a day  atorvastatin 40 milliGRAM(s) Oral at bedtime  cyclobenzaprine 10 milliGRAM(s) Oral every 8 hours PRN  DULoxetine 60 milliGRAM(s) Oral daily  famotidine    Tablet 20 milliGRAM(s) Oral daily  ferrous    sulfate 325 milliGRAM(s) Oral daily  gabapentin 100 milliGRAM(s) Oral every 8 hours  lamoTRIgine 25 milliGRAM(s) Oral daily  melatonin 3 milliGRAM(s) Oral at bedtime PRN  ondansetron Injectable 4 milliGRAM(s) IV Push every 8 hours PRN  oxyCODONE    IR 10 milliGRAM(s) Oral every 6 hours PRN  senna 1 Tablet(s) Oral two times a day
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
## 2024-08-30 NOTE — DISCHARGE NOTE NURSING/CASE MANAGEMENT/SOCIAL WORK - PATIENT PORTAL LINK FT
You can access the FollowMyHealth Patient Portal offered by [facility] by registering at the following website: [email or web address]. By joining [facility]’s FollowMyHealth portal, you will also be able to view your health information using other applications (apps) compatible with our system.

## 2024-08-30 NOTE — PROGRESS NOTE ADULT - REASON FOR ADMISSION
UTI & Suprapubic Catheter Malfunction

## 2024-08-30 NOTE — DISCHARGE NOTE NURSING/CASE MANAGEMENT/SOCIAL WORK - NSDCPEFALRISK_GEN_ALL_CORE
For information on Fall & Injury Prevention, visit: [email or web address] OR  [email or web address] OR  https://www.cdc.gov/steadi/patient.html

## 2024-09-06 DIAGNOSIS — R03.0 ELEVATED BLOOD-PRESSURE READING, WITHOUT DIAGNOSIS OF HYPERTENSION: ICD-10-CM

## 2024-09-06 DIAGNOSIS — Z79.01 LONG TERM (CURRENT) USE OF ANTICOAGULANTS: ICD-10-CM

## 2024-09-06 DIAGNOSIS — T83.091A OTHER MECHANICAL COMPLICATION OF INDWELLING URETHRAL CATHETER, INITIAL ENCOUNTER: ICD-10-CM

## 2024-09-06 DIAGNOSIS — N39.0 URINARY TRACT INFECTION, SITE NOT SPECIFIED: ICD-10-CM

## 2024-09-06 DIAGNOSIS — G82.21 PARAPLEGIA, COMPLETE: ICD-10-CM

## 2024-09-06 DIAGNOSIS — Z88.4 ALLERGY STATUS TO ANESTHETIC AGENT: ICD-10-CM

## 2024-10-28 ENCOUNTER — INPATIENT (INPATIENT)
Facility: HOSPITAL | Age: 34
LOS: 2 days | Discharge: NURSING, REHAB OR LONG-TERM CARE | End: 2024-10-31
Attending: HOSPITALIST | Admitting: HOSPITALIST
Payer: MEDICAID

## 2024-10-28 VITALS
WEIGHT: 250 LBS | OXYGEN SATURATION: 100 % | HEART RATE: 91 BPM | DIASTOLIC BLOOD PRESSURE: 138 MMHG | SYSTOLIC BLOOD PRESSURE: 209 MMHG | TEMPERATURE: 98 F | HEIGHT: 74 IN | RESPIRATION RATE: 15 BRPM

## 2024-10-28 DIAGNOSIS — T83.010A BREAKDOWN (MECHANICAL) OF CYSTOSTOMY CATHETER, INITIAL ENCOUNTER: ICD-10-CM

## 2024-10-28 DIAGNOSIS — Z93.3 COLOSTOMY STATUS: Chronic | ICD-10-CM

## 2024-10-28 DIAGNOSIS — Z29.9 ENCOUNTER FOR PROPHYLACTIC MEASURES, UNSPECIFIED: ICD-10-CM

## 2024-10-28 DIAGNOSIS — Z95.828 PRESENCE OF OTHER VASCULAR IMPLANTS AND GRAFTS: Chronic | ICD-10-CM

## 2024-10-28 DIAGNOSIS — G82.21 PARAPLEGIA, COMPLETE: ICD-10-CM

## 2024-10-28 DIAGNOSIS — I16.0 HYPERTENSIVE URGENCY: ICD-10-CM

## 2024-10-28 LAB
ALBUMIN SERPL ELPH-MCNC: 3.7 G/DL — SIGNIFICANT CHANGE UP (ref 3.3–5)
ALP SERPL-CCNC: 97 U/L — SIGNIFICANT CHANGE UP (ref 40–120)
ALT FLD-CCNC: 39 U/L — SIGNIFICANT CHANGE UP (ref 12–78)
ANION GAP SERPL CALC-SCNC: 9 MMOL/L — SIGNIFICANT CHANGE UP (ref 5–17)
AST SERPL-CCNC: 35 U/L — SIGNIFICANT CHANGE UP (ref 15–37)
BASOPHILS # BLD AUTO: 0.03 K/UL — SIGNIFICANT CHANGE UP (ref 0–0.2)
BASOPHILS NFR BLD AUTO: 0.4 % — SIGNIFICANT CHANGE UP (ref 0–2)
BILIRUB SERPL-MCNC: 0.4 MG/DL — SIGNIFICANT CHANGE UP (ref 0.2–1.2)
BUN SERPL-MCNC: 14 MG/DL — SIGNIFICANT CHANGE UP (ref 7–23)
CALCIUM SERPL-MCNC: 9.5 MG/DL — SIGNIFICANT CHANGE UP (ref 8.5–10.1)
CHLORIDE SERPL-SCNC: 103 MMOL/L — SIGNIFICANT CHANGE UP (ref 96–108)
CO2 SERPL-SCNC: 25 MMOL/L — SIGNIFICANT CHANGE UP (ref 22–31)
CREAT SERPL-MCNC: 0.76 MG/DL — SIGNIFICANT CHANGE UP (ref 0.5–1.3)
EGFR: 122 ML/MIN/1.73M2 — SIGNIFICANT CHANGE UP
EOSINOPHIL # BLD AUTO: 0.29 K/UL — SIGNIFICANT CHANGE UP (ref 0–0.5)
EOSINOPHIL NFR BLD AUTO: 3.8 % — SIGNIFICANT CHANGE UP (ref 0–6)
GLUCOSE SERPL-MCNC: 104 MG/DL — HIGH (ref 70–99)
HCT VFR BLD CALC: 37 % — LOW (ref 39–50)
HGB BLD-MCNC: 12.3 G/DL — LOW (ref 13–17)
IMM GRANULOCYTES NFR BLD AUTO: 0.1 % — SIGNIFICANT CHANGE UP (ref 0–0.9)
LYMPHOCYTES # BLD AUTO: 1.87 K/UL — SIGNIFICANT CHANGE UP (ref 1–3.3)
LYMPHOCYTES # BLD AUTO: 24.4 % — SIGNIFICANT CHANGE UP (ref 13–44)
MCHC RBC-ENTMCNC: 26.5 PG — LOW (ref 27–34)
MCHC RBC-ENTMCNC: 33.2 G/DL — SIGNIFICANT CHANGE UP (ref 32–36)
MCV RBC AUTO: 79.7 FL — LOW (ref 80–100)
MONOCYTES # BLD AUTO: 0.49 K/UL — SIGNIFICANT CHANGE UP (ref 0–0.9)
MONOCYTES NFR BLD AUTO: 6.4 % — SIGNIFICANT CHANGE UP (ref 2–14)
NEUTROPHILS # BLD AUTO: 4.98 K/UL — SIGNIFICANT CHANGE UP (ref 1.8–7.4)
NEUTROPHILS NFR BLD AUTO: 64.9 % — SIGNIFICANT CHANGE UP (ref 43–77)
NRBC # BLD: 0 /100 WBCS — SIGNIFICANT CHANGE UP (ref 0–0)
PLATELET # BLD AUTO: 312 K/UL — SIGNIFICANT CHANGE UP (ref 150–400)
POTASSIUM SERPL-MCNC: 3.7 MMOL/L — SIGNIFICANT CHANGE UP (ref 3.5–5.3)
POTASSIUM SERPL-SCNC: 3.7 MMOL/L — SIGNIFICANT CHANGE UP (ref 3.5–5.3)
PROT SERPL-MCNC: 9 GM/DL — HIGH (ref 6–8.3)
RBC # BLD: 4.64 M/UL — SIGNIFICANT CHANGE UP (ref 4.2–5.8)
RBC # FLD: 13.9 % — SIGNIFICANT CHANGE UP (ref 10.3–14.5)
SODIUM SERPL-SCNC: 137 MMOL/L — SIGNIFICANT CHANGE UP (ref 135–145)
WBC # BLD: 7.67 K/UL — SIGNIFICANT CHANGE UP (ref 3.8–10.5)
WBC # FLD AUTO: 7.67 K/UL — SIGNIFICANT CHANGE UP (ref 3.8–10.5)

## 2024-10-28 PROCEDURE — 74176 CT ABD & PELVIS W/O CONTRAST: CPT | Mod: 26,MC

## 2024-10-28 PROCEDURE — 99222 1ST HOSP IP/OBS MODERATE 55: CPT

## 2024-10-28 PROCEDURE — 99285 EMERGENCY DEPT VISIT HI MDM: CPT

## 2024-10-28 PROCEDURE — 93010 ELECTROCARDIOGRAM REPORT: CPT

## 2024-10-28 RX ORDER — OXYCODONE HYDROCHLORIDE 30 MG/1
10 TABLET ORAL EVERY 6 HOURS
Refills: 0 | Status: DISCONTINUED | OUTPATIENT
Start: 2024-10-28 | End: 2024-10-31

## 2024-10-28 RX ORDER — POLYETHYLENE GLYCOL 3350 17 G/17G
17 POWDER, FOR SOLUTION ORAL DAILY
Refills: 0 | Status: DISCONTINUED | OUTPATIENT
Start: 2024-10-28 | End: 2024-10-31

## 2024-10-28 RX ORDER — DULOXETINE HYDROCHLORIDE 30 MG/1
60 CAPSULE, DELAYED RELEASE ORAL DAILY
Refills: 0 | Status: DISCONTINUED | OUTPATIENT
Start: 2024-10-28 | End: 2024-10-31

## 2024-10-28 RX ORDER — CEFTRIAXONE SODIUM 10 G
1000 VIAL (EA) INJECTION ONCE
Refills: 0 | Status: DISCONTINUED | OUTPATIENT
Start: 2024-10-28 | End: 2024-10-29

## 2024-10-28 RX ORDER — OXYCODONE HYDROCHLORIDE 30 MG/1
10 TABLET ORAL ONCE
Refills: 0 | Status: DISCONTINUED | OUTPATIENT
Start: 2024-10-28 | End: 2024-10-28

## 2024-10-28 RX ORDER — CYCLOBENZAPRINE HYDROCHLORIDE 30 MG/1
10 CAPSULE, EXTENDED RELEASE ORAL ONCE
Refills: 0 | Status: DISCONTINUED | OUTPATIENT
Start: 2024-10-28 | End: 2024-10-28

## 2024-10-28 RX ORDER — FERROUS SULFATE 325(65) MG
325 TABLET ORAL DAILY
Refills: 0 | Status: DISCONTINUED | OUTPATIENT
Start: 2024-10-28 | End: 2024-10-31

## 2024-10-28 RX ORDER — CYCLOBENZAPRINE HYDROCHLORIDE 30 MG/1
10 CAPSULE, EXTENDED RELEASE ORAL ONCE
Refills: 0 | Status: COMPLETED | OUTPATIENT
Start: 2024-10-28 | End: 2024-10-28

## 2024-10-28 RX ORDER — APIXABAN 5 MG/1
5 TABLET, FILM COATED ORAL EVERY 12 HOURS
Refills: 0 | Status: DISCONTINUED | OUTPATIENT
Start: 2024-10-28 | End: 2024-10-30

## 2024-10-28 RX ORDER — LAMOTRIGINE 100 MG/1
25 TABLET ORAL DAILY
Refills: 0 | Status: DISCONTINUED | OUTPATIENT
Start: 2024-10-28 | End: 2024-10-31

## 2024-10-28 RX ORDER — GABAPENTIN 300 MG/1
100 CAPSULE ORAL EVERY 8 HOURS
Refills: 0 | Status: DISCONTINUED | OUTPATIENT
Start: 2024-10-28 | End: 2024-10-31

## 2024-10-28 RX ORDER — ACETAMINOPHEN 500 MG
1000 TABLET ORAL ONCE
Refills: 0 | Status: COMPLETED | OUTPATIENT
Start: 2024-10-28 | End: 2024-10-28

## 2024-10-28 RX ORDER — LOSARTAN POTASSIUM 25 MG/1
25 TABLET ORAL DAILY
Refills: 0 | Status: DISCONTINUED | OUTPATIENT
Start: 2024-10-28 | End: 2024-10-29

## 2024-10-28 RX ORDER — CEFTRIAXONE SODIUM 10 G
1000 VIAL (EA) INJECTION EVERY 24 HOURS
Refills: 0 | Status: DISCONTINUED | OUTPATIENT
Start: 2024-10-29 | End: 2024-10-29

## 2024-10-28 RX ORDER — OXYCODONE HYDROCHLORIDE 30 MG/1
10 TABLET ORAL EVERY 6 HOURS
Refills: 0 | Status: DISCONTINUED | OUTPATIENT
Start: 2024-10-28 | End: 2024-10-28

## 2024-10-28 RX ORDER — ONDANSETRON HYDROCHLORIDE 2 MG/ML
4 INJECTION, SOLUTION INTRAMUSCULAR; INTRAVENOUS EVERY 8 HOURS
Refills: 0 | Status: DISCONTINUED | OUTPATIENT
Start: 2024-10-28 | End: 2024-10-31

## 2024-10-28 RX ORDER — SENNA 187 MG
2 TABLET ORAL AT BEDTIME
Refills: 0 | Status: DISCONTINUED | OUTPATIENT
Start: 2024-10-28 | End: 2024-10-31

## 2024-10-28 RX ORDER — ACETAMINOPHEN 500 MG
650 TABLET ORAL EVERY 6 HOURS
Refills: 0 | Status: DISCONTINUED | OUTPATIENT
Start: 2024-10-28 | End: 2024-10-31

## 2024-10-28 RX ORDER — MELATONIN 5 MG
3 TABLET ORAL AT BEDTIME
Refills: 0 | Status: DISCONTINUED | OUTPATIENT
Start: 2024-10-28 | End: 2024-10-31

## 2024-10-28 RX ORDER — MAGNESIUM, ALUMINUM HYDROXIDE 200-200 MG
30 TABLET,CHEWABLE ORAL EVERY 4 HOURS
Refills: 0 | Status: DISCONTINUED | OUTPATIENT
Start: 2024-10-28 | End: 2024-10-31

## 2024-10-28 RX ORDER — FAMOTIDINE 10 MG/ML
20 INJECTION INTRAVENOUS DAILY
Refills: 0 | Status: DISCONTINUED | OUTPATIENT
Start: 2024-10-28 | End: 2024-10-31

## 2024-10-28 RX ORDER — CYCLOBENZAPRINE HYDROCHLORIDE 30 MG/1
10 CAPSULE, EXTENDED RELEASE ORAL EVERY 8 HOURS
Refills: 0 | Status: DISCONTINUED | OUTPATIENT
Start: 2024-10-28 | End: 2024-10-31

## 2024-10-28 RX ADMIN — DULOXETINE HYDROCHLORIDE 60 MILLIGRAM(S): 30 CAPSULE, DELAYED RELEASE ORAL at 23:51

## 2024-10-28 RX ADMIN — CYCLOBENZAPRINE HYDROCHLORIDE 10 MILLIGRAM(S): 30 CAPSULE, EXTENDED RELEASE ORAL at 21:30

## 2024-10-28 RX ADMIN — LAMOTRIGINE 25 MILLIGRAM(S): 100 TABLET ORAL at 23:37

## 2024-10-28 RX ADMIN — GABAPENTIN 100 MILLIGRAM(S): 300 CAPSULE ORAL at 22:13

## 2024-10-28 RX ADMIN — Medication 40 MILLIGRAM(S): at 22:13

## 2024-10-28 RX ADMIN — OXYCODONE HYDROCHLORIDE 10 MILLIGRAM(S): 30 TABLET ORAL at 22:13

## 2024-10-28 RX ADMIN — APIXABAN 5 MILLIGRAM(S): 5 TABLET, FILM COATED ORAL at 23:51

## 2024-10-28 RX ADMIN — Medication 400 MILLIGRAM(S): at 23:29

## 2024-10-28 RX ADMIN — Medication 2 TABLET(S): at 22:13

## 2024-10-28 RX ADMIN — Medication 3 MILLIGRAM(S): at 22:14

## 2024-10-28 NOTE — H&P ADULT - PROBLEM SELECTOR PLAN 2
- Elevated BP on admission, not on BP med at home  - Likely contributed by pain  - Pain control  - Patient had similar issue in Aug 2024  - Will start - Elevated BP on admission, not on BP med at home  - Likely contributed by pain  - Pain control  - Patient had similar issue in Aug 2024  - Patient with pre-DM. Will start Losartan 25mg
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
## 2024-10-28 NOTE — H&P ADULT - NSHPPHYSICALEXAM_GEN_ALL_CORE
GENERAL: NAD  HEAD: Atraumatic, Normocephalic  EYES: EOMI. Conjunctiva and sclera clear  NECK: Supple  CHEST/LUNG: Clear to auscultation bilaterally; No wheeze, rhonchi, rales  HEART: Regular rate and rhythm; No murmurs  ABDOMEN: Soft, Suprapubic tenderness; SPC no longer there; Nondistended; Bowel sounds present  EXTREMITIES: No edema  NEURO: AAOx3, non-focal  SKIN: No rashes or lesions

## 2024-10-28 NOTE — H&P ADULT - PROBLEM SELECTOR PLAN 1
- Hx of Suprapubic Catheter place by IR 7/5/2024  - P/w 1 week of suprapubic pain  - CT abd/pelvis = Decompressed bladder with indwelling suprapubic catheter. No well-defined fluid collection along the catheter tract. Circumferential bladder wall thickening, nonspecific. Mild perivesicular fat stranding. Correlate with urinalysis to exclude cystitis.  - Pain control  - F/u UA, UCx  - Urology consulted in the ED = Give IV abx. Will attempt to insert the catheter once bladder fills tomorrow.

## 2024-10-28 NOTE — ED ADULT NURSE REASSESSMENT NOTE - NS ED NURSE REASSESS COMMENT FT1
Unable to obtain IV access on PT at this time. 3 RN attempts and MD Perez attempt x2 with Ultrasound. MD aware unable to give IV medications at this time.
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
## 2024-10-28 NOTE — ED PROVIDER NOTE - MDM ORDERS SUBMITTED SELECTION
Dr Macias notified patient's HR is 28. Pt resting comfortably on pt cot. Denies any additional symptoms. B/p 160/78. Dr Macias aware.    Imaging Studies/Medications

## 2024-10-28 NOTE — ED PROVIDER NOTE - PROGRESS NOTE DETAILS
ANJALI Arroyo NP: CT shows possible cystitis.  Lab results otherwise unremarkable.  Urology unable to replace suprapubic catheter bedside.  Recommend admit medicine, will attempt again tomorrow with a full bladder.  CT shows collapsed bladder, patient does dribble urine from his penis.  Plan to to insert in IR if unable to place bedside tomorrow.  Patient updated on results and is agreeable to plan, questions answered.  Patient admitted to medicine.

## 2024-10-28 NOTE — ED ADULT TRIAGE NOTE - CHIEF COMPLAINT QUOTE
BIBA for pain in suprapubic catheter site started 1 week ago. No hematuria, fever or chills. PMH GERD, paraplegia, HLD

## 2024-10-28 NOTE — ED PROVIDER NOTE - PHYSICAL EXAMINATION
CONSTITUTIONAL: Appears well, in no apparent distress  HEAD: Normocephalic, no obvious signs of trauma  EENT: PERRL, EOMI w/o pain, no nystagmus, nares patent no drainage, no pharyngeal erythema, swelling, or exudates  NECK: Trachea midline, no goiter  RESP: L/S equal clr, bilat, apices and bases, no accessory muscle use, speaking full sentences  CARDIC: RRR, +S1/S2, no peripheral edema  GI: ABD soft, nondistended, nontender on palpation, no palpable masses  : No CVA Tenderness. 12.0 suprapubic catheter w/ Mac santiago stopcock and extensions to drainage bag w/ approx 200 cc dark yellow urine in drainage bag  MSK: 5/5 strength bilat upper extremities, paraplegic   SKIN: sacral ulcers   NEURO: A&OX4, CN intact, No focal motor deficits/weakness, no sensory deficits, no dysmetria, no slurred speech, no facial droop

## 2024-10-28 NOTE — CONSULT NOTE ADULT - ASSESSMENT
34M paraplegic, with SPC, says it is clogging.  Catheter flushing easily here but it has not been exchanged since july 2024  Attempted to exchange SPC, unable to insert new one  Urine is coming out of urethra, unable to pass wilson past strictures  Pt can void in diaper for now, as bladder refills may be able to reinsert SPC, if not will consult IR  Admit to medicine, treat for UTI  regular diet  Urology will follow

## 2024-10-28 NOTE — ED ADULT NURSE NOTE - OBJECTIVE STATEMENT
Patient alert and verbally responsive, came in due to pain in suprapubic catheter site started 1 week ago. Patient also stated that his Massey has not been draining. No hematuria, fever or chills. PMH GERD, paraplegia, HLD

## 2024-10-28 NOTE — ED PROVIDER NOTE - CLINICAL SUMMARY MEDICAL DECISION MAKING FREE TEXT BOX
- CT shows possible cystitis.  Lab results otherwise unremarkable.  Urology unable to replace suprapubic catheter bedside.  Recommend admit medicine, will attempt again tomorrow with a full bladder.  CT shows collapsed bladder, patient does dribble urine from his penis.  Plan to to insert in IR if unable to place bedside tomorrow.  Patient updated on results and is agreeable to plan, questions answered.  Patient admitted to medicine.  - Able to irrigate suprapubic catheter without difficulty.  Will obtain CT abdomen pelvis to assess for lower abdominal pain to rule out appendicitis, diverticulitis, renal stones, cystitis.  Will obtain CMP to assess electrolyte abnormalities/renal function, CBC to assess leukocytosis/anemia.  Will consult neurology for possible suprapubic catheter replacement and send urine sample for UTI.  Dispo pending results and reassessment.
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
## 2024-10-29 DIAGNOSIS — I82.509 CHRONIC EMBOLISM AND THROMBOSIS OF UNSPECIFIED DEEP VEINS OF UNSPECIFIED LOWER EXTREMITY: ICD-10-CM

## 2024-10-29 LAB
ALBUMIN SERPL ELPH-MCNC: 4.5 G/DL — SIGNIFICANT CHANGE UP (ref 3.3–5)
ALP SERPL-CCNC: 122 U/L — HIGH (ref 40–120)
ALT FLD-CCNC: 47 U/L — SIGNIFICANT CHANGE UP (ref 12–78)
ANION GAP SERPL CALC-SCNC: 10 MMOL/L — SIGNIFICANT CHANGE UP (ref 5–17)
APPEARANCE UR: ABNORMAL
AST SERPL-CCNC: 42 U/L — HIGH (ref 15–37)
BACTERIA # UR AUTO: ABNORMAL /HPF
BILIRUB SERPL-MCNC: 0.6 MG/DL — SIGNIFICANT CHANGE UP (ref 0.2–1.2)
BILIRUB UR-MCNC: ABNORMAL
BUN SERPL-MCNC: 11 MG/DL — SIGNIFICANT CHANGE UP (ref 7–23)
CALCIUM SERPL-MCNC: 9.9 MG/DL — SIGNIFICANT CHANGE UP (ref 8.5–10.1)
CHLORIDE SERPL-SCNC: 101 MMOL/L — SIGNIFICANT CHANGE UP (ref 96–108)
CO2 SERPL-SCNC: 20 MMOL/L — LOW (ref 22–31)
COLOR SPEC: ABNORMAL
CREAT SERPL-MCNC: 0.87 MG/DL — SIGNIFICANT CHANGE UP (ref 0.5–1.3)
DIFF PNL FLD: ABNORMAL
EGFR: 117 ML/MIN/1.73M2 — SIGNIFICANT CHANGE UP
EPI CELLS # UR: SIGNIFICANT CHANGE UP
GLUCOSE SERPL-MCNC: 99 MG/DL — SIGNIFICANT CHANGE UP (ref 70–99)
GLUCOSE UR QL: NEGATIVE MG/DL — SIGNIFICANT CHANGE UP
HCT VFR BLD CALC: 48.2 % — SIGNIFICANT CHANGE UP (ref 39–50)
HGB BLD-MCNC: 15.4 G/DL — SIGNIFICANT CHANGE UP (ref 13–17)
KETONES UR-MCNC: NEGATIVE MG/DL — SIGNIFICANT CHANGE UP
LEUKOCYTE ESTERASE UR-ACNC: ABNORMAL
MCHC RBC-ENTMCNC: 26.5 PG — LOW (ref 27–34)
MCHC RBC-ENTMCNC: 32 G/DL — SIGNIFICANT CHANGE UP (ref 32–36)
MCV RBC AUTO: 83 FL — SIGNIFICANT CHANGE UP (ref 80–100)
NITRITE UR-MCNC: POSITIVE
NRBC # BLD: 0 /100 WBCS — SIGNIFICANT CHANGE UP (ref 0–0)
PH UR: 7 — SIGNIFICANT CHANGE UP (ref 5–8)
PLATELET # BLD AUTO: 275 K/UL — SIGNIFICANT CHANGE UP (ref 150–400)
POTASSIUM SERPL-MCNC: 4 MMOL/L — SIGNIFICANT CHANGE UP (ref 3.5–5.3)
POTASSIUM SERPL-SCNC: 4 MMOL/L — SIGNIFICANT CHANGE UP (ref 3.5–5.3)
PROT SERPL-MCNC: 11.5 GM/DL — HIGH (ref 6–8.3)
PROT UR-MCNC: 300 MG/DL
RBC # BLD: 5.81 M/UL — HIGH (ref 4.2–5.8)
RBC # FLD: 14.1 % — SIGNIFICANT CHANGE UP (ref 10.3–14.5)
RBC CASTS # UR COMP ASSIST: 30 /HPF — SIGNIFICANT CHANGE UP (ref 0–4)
SODIUM SERPL-SCNC: 131 MMOL/L — LOW (ref 135–145)
SP GR SPEC: >1.03 — HIGH (ref 1–1.03)
UROBILINOGEN FLD QL: 1 MG/DL — SIGNIFICANT CHANGE UP (ref 0.2–1)
WBC # BLD: 6.61 K/UL — SIGNIFICANT CHANGE UP (ref 3.8–10.5)
WBC # FLD AUTO: 6.61 K/UL — SIGNIFICANT CHANGE UP (ref 3.8–10.5)
WBC UR QL: ABNORMAL /HPF (ref 0–5)

## 2024-10-29 PROCEDURE — 99231 SBSQ HOSP IP/OBS SF/LOW 25: CPT

## 2024-10-29 PROCEDURE — 36556 INSERT NON-TUNNEL CV CATH: CPT

## 2024-10-29 PROCEDURE — 99232 SBSQ HOSP IP/OBS MODERATE 35: CPT

## 2024-10-29 PROCEDURE — 76937 US GUIDE VASCULAR ACCESS: CPT | Mod: 26,AS

## 2024-10-29 RX ORDER — MIDODRINE HYDROCHLORIDE 2.5 MG/1
10 TABLET ORAL ONCE
Refills: 0 | Status: COMPLETED | OUTPATIENT
Start: 2024-10-29 | End: 2024-10-29

## 2024-10-29 RX ORDER — INFLUENZ VIR VAC TV P-SURF2003 15MCG/.5ML
0.5 SYRINGE (ML) INTRAMUSCULAR ONCE
Refills: 0 | Status: DISCONTINUED | OUTPATIENT
Start: 2024-10-29 | End: 2024-10-31

## 2024-10-29 RX ORDER — CIPROFLOXACIN LACTATE 200MG/20ML
500 VIAL (ML) INTRAVENOUS EVERY 12 HOURS
Refills: 0 | Status: DISCONTINUED | OUTPATIENT
Start: 2024-10-30 | End: 2024-10-31

## 2024-10-29 RX ADMIN — OXYCODONE HYDROCHLORIDE 10 MILLIGRAM(S): 30 TABLET ORAL at 17:15

## 2024-10-29 RX ADMIN — OXYCODONE HYDROCHLORIDE 10 MILLIGRAM(S): 30 TABLET ORAL at 23:27

## 2024-10-29 RX ADMIN — OXYCODONE HYDROCHLORIDE 10 MILLIGRAM(S): 30 TABLET ORAL at 10:46

## 2024-10-29 RX ADMIN — GABAPENTIN 100 MILLIGRAM(S): 300 CAPSULE ORAL at 21:36

## 2024-10-29 RX ADMIN — CYCLOBENZAPRINE HYDROCHLORIDE 10 MILLIGRAM(S): 30 CAPSULE, EXTENDED RELEASE ORAL at 13:54

## 2024-10-29 RX ADMIN — OXYCODONE HYDROCHLORIDE 10 MILLIGRAM(S): 30 TABLET ORAL at 04:05

## 2024-10-29 RX ADMIN — LAMOTRIGINE 25 MILLIGRAM(S): 100 TABLET ORAL at 13:54

## 2024-10-29 RX ADMIN — CYCLOBENZAPRINE HYDROCHLORIDE 10 MILLIGRAM(S): 30 CAPSULE, EXTENDED RELEASE ORAL at 05:34

## 2024-10-29 RX ADMIN — MIDODRINE HYDROCHLORIDE 10 MILLIGRAM(S): 2.5 TABLET ORAL at 09:01

## 2024-10-29 RX ADMIN — APIXABAN 5 MILLIGRAM(S): 5 TABLET, FILM COATED ORAL at 17:15

## 2024-10-29 RX ADMIN — Medication 40 MILLIGRAM(S): at 21:35

## 2024-10-29 RX ADMIN — OXYCODONE HYDROCHLORIDE 10 MILLIGRAM(S): 30 TABLET ORAL at 05:05

## 2024-10-29 RX ADMIN — FAMOTIDINE 20 MILLIGRAM(S): 10 INJECTION INTRAVENOUS at 13:54

## 2024-10-29 RX ADMIN — LOSARTAN POTASSIUM 25 MILLIGRAM(S): 25 TABLET ORAL at 05:34

## 2024-10-29 RX ADMIN — Medication 325 MILLIGRAM(S): at 13:54

## 2024-10-29 RX ADMIN — CYCLOBENZAPRINE HYDROCHLORIDE 10 MILLIGRAM(S): 30 CAPSULE, EXTENDED RELEASE ORAL at 21:35

## 2024-10-29 RX ADMIN — APIXABAN 5 MILLIGRAM(S): 5 TABLET, FILM COATED ORAL at 05:34

## 2024-10-29 RX ADMIN — OXYCODONE HYDROCHLORIDE 10 MILLIGRAM(S): 30 TABLET ORAL at 18:10

## 2024-10-29 RX ADMIN — GABAPENTIN 100 MILLIGRAM(S): 300 CAPSULE ORAL at 05:35

## 2024-10-29 RX ADMIN — GABAPENTIN 100 MILLIGRAM(S): 300 CAPSULE ORAL at 13:53

## 2024-10-29 RX ADMIN — DULOXETINE HYDROCHLORIDE 60 MILLIGRAM(S): 30 CAPSULE, DELAYED RELEASE ORAL at 13:53

## 2024-10-29 RX ADMIN — OXYCODONE HYDROCHLORIDE 10 MILLIGRAM(S): 30 TABLET ORAL at 11:45
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
## 2024-10-29 NOTE — PATIENT PROFILE ADULT - CENTRAL VENOUS CATHETER/PICC LINE
Problem: Oxygenation/Respiratory Function  Goal: # Maintain/achieve baseline respiratory status (ex: appropriate respiratory rate, nonlabored work of breathing, symmetrical chest expansion, optimal breath sounds, effective gas exchange)  Outcome: Outcome Not Met at Discharge (Non-Nursing Use Only)     Problem: Thermoregulation  Goal: # Body temperature maintained within normal range  Outcome: Outcome Not Met at Discharge (Non-Nursing Use Only)     Problem: Seizures  Goal: # Seizure activity controlled  Outcome: Outcome Not Met at Discharge (Non-Nursing Use Only)     
no
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
## 2024-10-29 NOTE — PROGRESS NOTE ADULT - SUBJECTIVE AND OBJECTIVE BOX
PROGRESS NOTE:     Patient is a 34y old  Male who presents with a chief complaint of Suprapubic catheter dysfunction (28 Oct 2024 21:39)      SUBJECTIVE / OVERNIGHT EVENTS: pt c/o suprapubic pain.     ADDITIONAL REVIEW OF SYSTEMS:    MEDICATIONS  (STANDING):  apixaban 5 milliGRAM(s) Oral every 12 hours  atorvastatin 40 milliGRAM(s) Oral at bedtime  cyclobenzaprine 10 milliGRAM(s) Oral every 8 hours  DULoxetine 60 milliGRAM(s) Oral daily  famotidine    Tablet 20 milliGRAM(s) Oral daily  ferrous    sulfate 325 milliGRAM(s) Oral daily  gabapentin 100 milliGRAM(s) Oral every 8 hours  influenza   Vaccine 0.5 milliLiter(s) IntraMuscular once  lamoTRIgine 25 milliGRAM(s) Oral daily  melatonin 3 milliGRAM(s) Oral at bedtime  polyethylene glycol 3350 17 Gram(s) Oral daily  senna 2 Tablet(s) Oral at bedtime    MEDICATIONS  (PRN):  acetaminophen     Tablet .. 650 milliGRAM(s) Oral every 6 hours PRN Temp greater or equal to 38C (100.4F), Mild Pain (1 - 3)  aluminum hydroxide/magnesium hydroxide/simethicone Suspension 30 milliLiter(s) Oral every 4 hours PRN Dyspepsia  ondansetron Injectable 4 milliGRAM(s) IV Push every 8 hours PRN Nausea and/or Vomiting  oxyCODONE    IR 10 milliGRAM(s) Oral every 6 hours PRN Severe Pain (7 - 10)      CAPILLARY BLOOD GLUCOSE        I&O's Summary    29 Oct 2024 07:01  -  29 Oct 2024 14:04  --------------------------------------------------------  IN: 240 mL / OUT: 0 mL / NET: 240 mL        PHYSICAL EXAM:  Vital Signs Last 24 Hrs  T(C): 36.8 (29 Oct 2024 10:30), Max: 36.9 (28 Oct 2024 16:13)  T(F): 98.2 (29 Oct 2024 10:30), Max: 98.5 (28 Oct 2024 16:13)  HR: 63 (29 Oct 2024 10:30) (63 - 107)  BP: 106/71 (29 Oct 2024 10:30) (85/55 - 209/138)  BP(mean): --  RR: 17 (29 Oct 2024 10:30) (15 - 20)  SpO2: 99% (29 Oct 2024 10:30) (96% - 100%)    Parameters below as of 29 Oct 2024 08:44  Patient On (Oxygen Delivery Method): room air    GENERAL: NAD  EYES: EOMI. Conjunctiva and sclera clear  NECK: Supple  CHEST/LUNG: Clear to auscultation bilaterally; No wheeze, rhonchi, rales  HEART: Regular rate and rhythm; No murmurs  ABDOMEN: Soft, Suprapubic tenderness; SPC no longer there; Nondistended; Bowel sounds present  EXTREMITIES: No edema, no clubbing or cyanosis   NEURO: AAOx3, lower extremity paraplegia   SKIN: No rashes or lesions    LABS:                        15.4   6.61  )-----------( 275      ( 29 Oct 2024 07:09 )             48.2     10-29    131[L]  |  101  |  11  ----------------------------<  99  4.0   |  20[L]  |  0.87    Ca    9.9      29 Oct 2024 07:09    TPro  11.5[H]  /  Alb  4.5  /  TBili  0.6  /  DBili  x   /  AST  42[H]  /  ALT  47  /  AlkPhos  122[H]  10-29          Urinalysis Basic - ( 29 Oct 2024 07:09 )    Color: x / Appearance: x / SG: x / pH: x  Gluc: 99 mg/dL / Ketone: x  / Bili: x / Urobili: x   Blood: x / Protein: x / Nitrite: x   Leuk Esterase: x / RBC: x / WBC x   Sq Epi: x / Non Sq Epi: x / Bacteria: x          RADIOLOGY & ADDITIONAL TESTS:  Results Reviewed:   Imaging Personally Reviewed:  Electrocardiogram Personally Reviewed:    COORDINATION OF CARE:  Care Discussed with Consultants/Other Providers [Y/N]:  Prior or Outpatient Records Reviewed [Y/N]:

## 2024-10-29 NOTE — PROVIDER CONTACT NOTE (OTHER) - SITUATION
Received pt from ED without a functioning PIV access. As per ED RN, pt's PIV was ultrasound guided. Multiple attempts from RN & unsuccessful placing an IV.

## 2024-10-29 NOTE — PATIENT PROFILE ADULT - NSPROPTRIGHTSUPPORTPERSON_GEN_A_NUR
[Cervical Pap Smear] : cervical Pap smear [Liquid Base] : liquid base [Tolerated Well] : the patient tolerated the procedure well [No Complications] : there were no complications declines

## 2024-10-29 NOTE — PROCEDURE NOTE - ADDITIONAL PROCEDURE DETAILS
Compressible vessel confirmed under US, needle access visualized within lumen on short axis. Wire visualized in vessel on long axis, catheter inserted smoothly with brisk blood return

## 2024-10-29 NOTE — PROVIDER CONTACT NOTE (OTHER) - ACTION/TREATMENT ORDERED:
Jethro JOHNSON notified & made aware. Awaiting further instruction at this time. PA states he will reach out to surgical PA to place PIV or order PO abx since unable to give Ceftriaxone IVPB dose.

## 2024-10-29 NOTE — PATIENT PROFILE ADULT - NSTOBACCONEVERSMOKERY/N_GEN_A
ACUTE PHYSICAL THERAPY GOALS:  (Developed with and agreed upon by patient and/or caregiver.)  1. Pt will perform bed mobility (I) without cueing in 7 therapy sessions. 2. Pt will perform all transfers under (S) c use of LRAD in 7 therapy sessions. 3. Pt will ambulate 200 ft SB (A) with use of LRAD and breaks as needed in 7 therapy sessions. 4. Pt will perform standing balance activities with minimal postural sway in 7 therapy sessions. 5. Pt will tolerate multiple sets and reps of BLE exercises in 7 therapy sessions. PHYSICAL THERAPY ASSESSMENT: Initial Assessment, Daily Note and PM PT Treatment Day # 1      Lauren Chapman is a 72 y.o. female   PRIMARY DIAGNOSIS: Acute renal failure (ARF) ([facility]  Acute renal failure (ARF) ([facility] [N17.9]  Bladder cancer ([facility] [C67.9]  Procedure(s) (LRB):  EX LAP/ LYSIS OF ADHESIONS/ OMENTAL FLAP (N/A)  1 Day Post-Op  Reason for Referral:    ICD-10: Treatment Diagnosis: Generalized Muscle Weakness (M62.81)  Difficulty in walking, Not elsewhere classified (R26.2)  Other abnormalities of gait and mobility (R26.89)  INPATIENT: Payor: JERSON / [facility] [employer] / Product Type: Managed Care Medicare /     ASSESSMENT:     REHAB RECOMMENDATIONS:   Recommendation to date pending progress:  [address] [place] [address] Therapy  Equipment:    Rolling Walker     PRIOR LEVEL OF FUNCTION:  (Prior to Hospitalization) INITIAL/CURRENT LEVEL OF FUNCTION:  (Most Recently Demonstrated)   Bed Mobility:   Independent  Sit to Stand:   Independent  Transfers:   Independent  Gait/Mobility:   Independent Bed Mobility:   Not tested (in chair on arrival/ departure)  Sit to Stand:  [employer] Assistance  Transfers:   Not tested  Gait/Mobility:  [employer] Assistance     ASSESSMENT:  Ms. Homero Garcia Is a 72 y.o F presenting to PT POD 1 s/p Ex Lap/ Lysis of adhesions/ Omental flap. PTA pt lives c her significant other in a single-level hotel [place]  Pt reports she was (I) for mobility needs at baseline without use of an AD although she stark report receiving (A) from significant other for ADLs/ IADLs. At time of initial evaluation, pt presents below baseline LOF with deficits in strength, standing dynamic balance, gait and activity tolerance limiting her overall functional mobility. Today, pt performed sit-to-stand from chair with SB (A) while requiring CG (A) for ambulation of 60'x1. Of note, pt requiring inc time for all mobility d/t pain and generalized weakness however she ultimately does well to perform all activity without physical (A). Pt ambulates c slow and shuffling gait showing narrow BAKARI, dec foot clearance YULY and inc postural sway. This session, pt able to ambulate 60'x1 c CG (A) which is an improvement from her pre-operative evaluation. As previously documented; pt would benefit from use of RW for added stability, she does not have access to one at home. At this time, pt is an appropriate candidate for skilled PT and will benefit from POC designed to address the aforementioned deficits. Upon completion of treatment, pt was positioned to comfort in bed with needs in reach. RN was made aware of pt performance. DC Recommendation: HHPT and Family (S)/(A)     SUBJECTIVE:   Ms. Grady Stoll states, \"Aint nothing [email or web address] keep me down\"    SOCIAL HISTORY/LIVING ENVIRONMENT:  lives c her significant other in a single-level hotel [place]  (I) for mobility needs at baseline without use of an AD; Reports receiving (A) from significant other for ADLs/ IADLs  Home Environment: Private residence  One/Two Story Residence: One story  Living Alone: No  Support Systems: Spouse/Significant Other  OBJECTIVE:     PAIN: VITAL SIGNS: LINES/DRAINS:   Pre Treatment: Pain Screen  Pain Scale 1: Numeric (0 - 10)  Pain Intensity 1: 0  Pain Onset 1: ongoing   Pain Location 1: Abdomen  Pain Orientation 1: Mid  Pain Description 1: Aching  Pain Intervention(s) 1: Ambulation/Increased Activity  Post Treatment: 5 Vital Signs  O2 Sat (%): 95 %  O2 Device: Nasal cannula IV  O2 Device: Nasal cannula     GROSS EVALUATION:  BLE Within Functional Limits Abnormal/ Functional Abnormal/ Non-Functional (see comments) Not Tested Comments:   AROM [x] [] [] []    PROM [] [] [] []    Strength [] [x] [] [] Globally weaker than baseline    Balance [] [] [x] [] Inc postural sway and narrow BAKARI    Posture [x] [] [] []    Sensation [] [] [] [x]    Coordination [x] [] [] []    Tone [x] [] [] []    Edema [x] [] [] []    Activity Tolerance [] [x] [] [] Below baseline level    [] [] [] []      COGNITION/  PERCEPTION: Intact Impaired   (see comments) Comments:   Orientation [x] []    Vision [x] []    Hearing [x] []    Command Following [x] []    Safety Awareness [x] []     [] []      MOBILITY: I Mod I S SBA CGA Min Mod Max Total  NT x2 Comments:   Bed Mobility    Rolling [] [] [] [] [] [] [] [] [] [x] []    Supine to Sit [] [] [] [] [] [] [] [] [] [x] []    Scooting [] [] [] [] [] [] [] [] [] [x] []    Sit to Supine [] [] [] [] [] [] [] [] [] [x] []    Transfers    Sit to Stand [] [] [] [x] [] [] [] [] [] [] []    Bed to Chair [] [] [] [] [] [] [] [] [] [x] []    Stand to Sit [] [] [] [x] [] [] [] [] [] [] []    I=Independent, Mod I=Modified Independent, S=Supervision, SBA=Standby Assistance, CGA=Contact Guard Assistance,   Min=Minimal Assistance, Mod=Moderate Assistance, Max=Maximal Assistance, Total=Total Assistance, NT=Not Tested  GAIT: I Mod I S SBA CGA Min Mod Max Total  NT x2 Comments:   Level of Assistance [] [] [] [] [x] [] [] [] [] [] []    Distance 60'x1    DME Rolling Walker    Gait Quality slow and shuffling gait showing narrow BAKARI, dec foot clearance YULY and inc postural sway    Weightbearing Status N/A     I=Independent, Mod I=Modified Independent, S=Supervision, SBA=Standby Assistance, CGA=Contact Guard Assistance,   Min=Minimal Assistance, Mod=Moderate Assistance, Max=Maximal Assistance, Total=Total Assistance, NT=Not Tested    [facility] Mobility Inpatient Short Form       How much difficulty does the patient currently have. .. Unable A Lot A Little None   1. Turning over in bed (including adjusting bedclothes, sheets and blankets)? [] 1   [] 2   [] 3   [x] 4   2. Sitting down on and standing up from a chair with arms ( e.g., wheelchair, bedside commode, etc.)   [] 1   [] 2   [] 3   [x] 4   3. Moving from lying on back to sitting on the side of the bed? [] 1   [] 2   [x] 3   [] 4   How much help from another person does the patient currently need. .. Total A Lot A Little None   4. Moving to and from a bed to a chair (including a wheelchair)? [] 1   [] 2   [x] 3   [] 4   5. Need to walk in hospital room? [] 1   [] 2   [x] 3   [] 4   6. Climbing 3-5 steps with a railing? [] 1   [] 2   [x] 3   [] 4   © 2007, Trustees of [address] [place], [place] [address], under license to [employer]. All rights reserved     Score:  Initial: 20 Most Recent: X (Date: -- )    Interpretation of Tool:  Represents activities that are increasingly more difficult (i.e. Bed mobility, Transfers, Gait). PLAN:   FREQUENCY/DURATION: PT Plan of Care: 4 times/week for duration of hospital stay or until stated goals are met, whichever comes first.    PROBLEM LIST:   (Skilled intervention is medically necessary to address:)  1. Decreased ADL/Functional Activities  2. Decreased Activity Tolerance  3. Decreased Balance  4. Decreased Gait Ability  5. Decreased Strength  6. Increased Pain   INTERVENTIONS PLANNED:   (Benefits and precautions of physical therapy have been discussed with the patient.)  1. Therapeutic Activity  2. Therapeutic Exercise/HEP  3. Neuromuscular Re-education  4. Gait Training  5. Manual Therapy  6. Education     TREATMENT:     EVALUATION: Low Complexity : (Untimed Charge)    TREATMENT:   ($$ Therapeutic Activity: 8-22 mins    )  Therapeutic Activity (16 Minutes):  Therapeutic activity included Scooting, Lateral Scooting, Ambulation on level ground, Sitting balance  and Standing balance to improve functional Mobility, Strength and Activity tolerance.     TREATMENT GRID:  N/A    AFTER TREATMENT POSITION/PRECAUTIONS:  Chair, Needs within reach, RN notified and Visitors at bedside    INTERDISCIPLINARY COLLABORATION:  RN/PCT and PT/PTA    TOTAL TREATMENT DURATION:  PT Patient Time In/Time Out  Time In: 1316  Time Out: Ysitie 30, PT No

## 2024-10-29 NOTE — PROVIDER CONTACT NOTE (OTHER) - BACKGROUND
Patient admitted for subprapubic catheter dysfunction w/ PMH VTE, paraplegia, & hypertensive urgency.

## 2024-10-29 NOTE — PATIENT PROFILE ADULT - FALL HARM RISK - HARM RISK INTERVENTIONS

## 2024-10-30 DIAGNOSIS — N39.0 URINARY TRACT INFECTION, SITE NOT SPECIFIED: ICD-10-CM

## 2024-10-30 PROCEDURE — 99232 SBSQ HOSP IP/OBS MODERATE 35: CPT

## 2024-10-30 PROCEDURE — 51705 CHANGE OF BLADDER TUBE: CPT

## 2024-10-30 PROCEDURE — 75984 XRAY CONTROL CATHETER CHANGE: CPT | Mod: 26

## 2024-10-30 RX ORDER — ENOXAPARIN SODIUM 40MG/0.4ML
120 SYRINGE (ML) SUBCUTANEOUS EVERY 12 HOURS
Refills: 0 | Status: DISCONTINUED | OUTPATIENT
Start: 2024-10-30 | End: 2024-10-31

## 2024-10-30 RX ADMIN — CYCLOBENZAPRINE HYDROCHLORIDE 10 MILLIGRAM(S): 30 CAPSULE, EXTENDED RELEASE ORAL at 21:35

## 2024-10-30 RX ADMIN — OXYCODONE HYDROCHLORIDE 10 MILLIGRAM(S): 30 TABLET ORAL at 05:33

## 2024-10-30 RX ADMIN — Medication 325 MILLIGRAM(S): at 13:01

## 2024-10-30 RX ADMIN — OXYCODONE HYDROCHLORIDE 10 MILLIGRAM(S): 30 TABLET ORAL at 21:36

## 2024-10-30 RX ADMIN — OXYCODONE HYDROCHLORIDE 10 MILLIGRAM(S): 30 TABLET ORAL at 13:00

## 2024-10-30 RX ADMIN — Medication 40 MILLIGRAM(S): at 21:35

## 2024-10-30 RX ADMIN — OXYCODONE HYDROCHLORIDE 10 MILLIGRAM(S): 30 TABLET ORAL at 06:33

## 2024-10-30 RX ADMIN — OXYCODONE HYDROCHLORIDE 10 MILLIGRAM(S): 30 TABLET ORAL at 00:27

## 2024-10-30 RX ADMIN — GABAPENTIN 100 MILLIGRAM(S): 300 CAPSULE ORAL at 05:33

## 2024-10-30 RX ADMIN — CYCLOBENZAPRINE HYDROCHLORIDE 10 MILLIGRAM(S): 30 CAPSULE, EXTENDED RELEASE ORAL at 05:34

## 2024-10-30 RX ADMIN — OXYCODONE HYDROCHLORIDE 10 MILLIGRAM(S): 30 TABLET ORAL at 22:36

## 2024-10-30 RX ADMIN — GABAPENTIN 100 MILLIGRAM(S): 300 CAPSULE ORAL at 13:05

## 2024-10-30 RX ADMIN — Medication 3 MILLIGRAM(S): at 21:35

## 2024-10-30 RX ADMIN — Medication 500 MILLIGRAM(S): at 05:34

## 2024-10-30 RX ADMIN — LAMOTRIGINE 25 MILLIGRAM(S): 100 TABLET ORAL at 13:01

## 2024-10-30 RX ADMIN — Medication 500 MILLIGRAM(S): at 17:05

## 2024-10-30 RX ADMIN — FAMOTIDINE 20 MILLIGRAM(S): 10 INJECTION INTRAVENOUS at 13:01

## 2024-10-30 RX ADMIN — OXYCODONE HYDROCHLORIDE 10 MILLIGRAM(S): 30 TABLET ORAL at 14:00

## 2024-10-30 RX ADMIN — GABAPENTIN 100 MILLIGRAM(S): 300 CAPSULE ORAL at 21:37

## 2024-10-30 RX ADMIN — DULOXETINE HYDROCHLORIDE 60 MILLIGRAM(S): 30 CAPSULE, DELAYED RELEASE ORAL at 13:01

## 2024-10-30 RX ADMIN — CYCLOBENZAPRINE HYDROCHLORIDE 10 MILLIGRAM(S): 30 CAPSULE, EXTENDED RELEASE ORAL at 13:03

## 2024-10-30 RX ADMIN — Medication 120 MILLIGRAM(S): at 17:05

## 2024-10-30 RX ADMIN — APIXABAN 5 MILLIGRAM(S): 5 TABLET, FILM COATED ORAL at 05:34

## 2024-10-30 NOTE — PROGRESS NOTE ADULT - SUBJECTIVE AND OBJECTIVE BOX
Pt seen and examined at bedside, had random bladder scan just now that was 172cc, pt states he is still voiding small amounts and denies pain. Pt is on AC (Eliquis) for h/o dvts. Denies f/c.    T(F): 97.7 (10-30-24 @ 10:32), Max: 98 (10-29-24 @ 23:30)  HR: 86 (10-30-24 @ 10:32) (82 - 100)  BP: 144/92 (10-30-24 @ 10:32) (107/71 - 147/92)  RR: 20 (10-30-24 @ 05:23) (16 - 20)  SpO2: 100% (10-30-24 @ 10:32) (98% - 100%)  Wt(kg): --  CAPILLARY BLOOD GLUCOSE      PHYSICAL EXAM:  General: NAD, WDWN  Neuro:  Alert & oriented x 3  CV: +S1S2 regular rate and rhythm  Lung: respirations nonlabored, good inspiratory effort  Abdomen: soft, NTND. Normactive BS  : uncircumcised phallus, adequate meatus. no bladder distention appreciated    LABS:                        15.4   6.61  )-----------( 275      ( 29 Oct 2024 07:09 )             48.2   10-29    131[L]  |  101  |  11  ----------------------------<  99  4.0   |  20[L]  |  0.87    Ca    9.9      29 Oct 2024 07:09    TPro  11.5[H]  /  Alb  4.5  /  TBili  0.6  /  DBili  x   /  AST  42[H]  /  ALT  47  /  AlkPhos  122[H]  10-29    I&O's Detail    29 Oct 2024 07:01  -  30 Oct 2024 07:00  --------------------------------------------------------  IN:    Oral Fluid: 240 mL  Total IN: 240 mL    OUT:    Incontinent per Condom Catheter (mL): 1300 mL  Total OUT: 1300 mL    Total NET: -1060 mL      34M paraplegic, with clogged SPC that was removed, renal function remains normal, pt with normal wbc and afebrile, continues to void smalll amounts with low residual volume    Will need SPC placed by IR, discussed with IR team  AC will need to be held for IR procedure Friday  cont Abx for UTI  regular diet  cont medical management  discussed with Dr. Renteria     Pt seen and examined at bedside, had random bladder scan just now that was 172cc, pt states he is still voiding small amounts and denies pain. Pt is on AC (Eliquis) for h/o dvts. Denies f/c.    T(F): 97.7 (10-30-24 @ 10:32), Max: 98 (10-29-24 @ 23:30)  HR: 86 (10-30-24 @ 10:32) (82 - 100)  BP: 144/92 (10-30-24 @ 10:32) (107/71 - 147/92)  RR: 20 (10-30-24 @ 05:23) (16 - 20)  SpO2: 100% (10-30-24 @ 10:32) (98% - 100%)  Wt(kg): --  CAPILLARY BLOOD GLUCOSE      PHYSICAL EXAM:  General: NAD, WDWN  Neuro:  Alert & oriented x 3  CV: +S1S2 regular rate and rhythm  Lung: respirations nonlabored, good inspiratory effort  Abdomen: soft, NTND. Normactive BS  : uncircumcised phallus, adequate meatus. no bladder distention appreciated    LABS:                        15.4   6.61  )-----------( 275      ( 29 Oct 2024 07:09 )             48.2   10-29    131[L]  |  101  |  11  ----------------------------<  99  4.0   |  20[L]  |  0.87    Ca    9.9      29 Oct 2024 07:09    TPro  11.5[H]  /  Alb  4.5  /  TBili  0.6  /  DBili  x   /  AST  42[H]  /  ALT  47  /  AlkPhos  122[H]  10-29    I&O's Detail    29 Oct 2024 07:01  -  30 Oct 2024 07:00  --------------------------------------------------------  IN:    Oral Fluid: 240 mL  Total IN: 240 mL    OUT:    Incontinent per Condom Catheter (mL): 1300 mL  Total OUT: 1300 mL    Total NET: -1060 mL      34M paraplegic, with clogged SPC that was removed, renal function remains normal, pt with normal wbc and afebrile, continues to void smalll amounts with low residual volume    Will need SPC placed by IR, discussed with IR team  AC will need to be held for IR procedure   cont Abx for UTI  regular diet  cont medical management  discussed with Dr. Renteria

## 2024-10-30 NOTE — PROGRESS NOTE ADULT - SUBJECTIVE AND OBJECTIVE BOX
PROGRESS NOTE:     Patient is a 34y old  Male who presents with a chief complaint of Suprapubic catheter dysfunction (30 Oct 2024 10:31)      SUBJECTIVE / OVERNIGHT EVENTS: no acute events.     ADDITIONAL REVIEW OF SYSTEMS:    MEDICATIONS  (STANDING):  atorvastatin 40 milliGRAM(s) Oral at bedtime  ciprofloxacin     Tablet 500 milliGRAM(s) Oral every 12 hours  cyclobenzaprine 10 milliGRAM(s) Oral every 8 hours  DULoxetine 60 milliGRAM(s) Oral daily  enoxaparin Injectable 120 milliGRAM(s) SubCutaneous every 12 hours  famotidine    Tablet 20 milliGRAM(s) Oral daily  ferrous    sulfate 325 milliGRAM(s) Oral daily  gabapentin 100 milliGRAM(s) Oral every 8 hours  influenza   Vaccine 0.5 milliLiter(s) IntraMuscular once  lamoTRIgine 25 milliGRAM(s) Oral daily  melatonin 3 milliGRAM(s) Oral at bedtime  polyethylene glycol 3350 17 Gram(s) Oral daily  senna 2 Tablet(s) Oral at bedtime    MEDICATIONS  (PRN):  acetaminophen     Tablet .. 650 milliGRAM(s) Oral every 6 hours PRN Temp greater or equal to 38C (100.4F), Mild Pain (1 - 3)  aluminum hydroxide/magnesium hydroxide/simethicone Suspension 30 milliLiter(s) Oral every 4 hours PRN Dyspepsia  ondansetron Injectable 4 milliGRAM(s) IV Push every 8 hours PRN Nausea and/or Vomiting  oxyCODONE    IR 10 milliGRAM(s) Oral every 6 hours PRN Severe Pain (7 - 10)      CAPILLARY BLOOD GLUCOSE        I&O's Summary    29 Oct 2024 07:01  -  30 Oct 2024 07:00  --------------------------------------------------------  IN: 240 mL / OUT: 1300 mL / NET: -1060 mL        PHYSICAL EXAM:  Vital Signs Last 24 Hrs  T(C): 36.5 (30 Oct 2024 10:32), Max: 36.7 (29 Oct 2024 23:30)  T(F): 97.7 (30 Oct 2024 10:32), Max: 98 (29 Oct 2024 23:30)  HR: 86 (30 Oct 2024 10:32) (82 - 100)  BP: 144/92 (30 Oct 2024 10:32) (107/71 - 147/92)  BP(mean): --  RR: 20 (30 Oct 2024 05:23) (16 - 20)  SpO2: 100% (30 Oct 2024 10:32) (98% - 100%)    Parameters below as of 29 Oct 2024 16:17  Patient On (Oxygen Delivery Method): room air    GENERAL: NAD  EYES: EOMI. Conjunctiva and sclera clear  NECK: Supple  CHEST/LUNG: Clear to auscultation bilaterally; No wheeze, rhonchi, rales  HEART: Regular rate and rhythm; No murmurs  ABDOMEN: Soft, non-tender, non-distended; SPC no longer there; Bowel sounds present  EXTREMITIES: No edema, no clubbing or cyanosis   NEURO: AAOx3, lower extremity paraplegia   SKIN: No rashes or lesions    LABS:                        15.4   6.61  )-----------( 275      ( 29 Oct 2024 07:09 )             48.2     10-29    131[L]  |  101  |  11  ----------------------------<  99  4.0   |  20[L]  |  0.87    Ca    9.9      29 Oct 2024 07:09    TPro  11.5[H]  /  Alb  4.5  /  TBili  0.6  /  DBili  x   /  AST  42[H]  /  ALT  47  /  AlkPhos  122[H]  10-29          Urinalysis Basic - ( 29 Oct 2024 07:09 )    Color: x / Appearance: x / SG: x / pH: x  Gluc: 99 mg/dL / Ketone: x  / Bili: x / Urobili: x   Blood: x / Protein: x / Nitrite: x   Leuk Esterase: x / RBC: x / WBC x   Sq Epi: x / Non Sq Epi: x / Bacteria: x        Culture - Urine (collected 29 Oct 2024 03:00)  Source: Suprapubic Suprapubic  Preliminary Report (30 Oct 2024 11:12):    >100,000 CFU/ml Klebsiella pneumoniae        RADIOLOGY & ADDITIONAL TESTS:  Results Reviewed:   Imaging Personally Reviewed:  Electrocardiogram Personally Reviewed:    COORDINATION OF CARE:  Care Discussed with Consultants/Other Providers [Y/N]:  Prior or Outpatient Records Reviewed [Y/N]:
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
## 2024-10-30 NOTE — PROGRESS NOTE ADULT - PROBLEM SELECTOR PLAN 5
- Eliquis
- hold Eliquis for SPC placement Friday   - will transition to therapeutic Lovenox for now, hold after Thursday morning dose

## 2024-10-30 NOTE — PROGRESS NOTE ADULT - PROBLEM SELECTOR PLAN 3
- Elevated BP on admission likely d/t pain, not on BP med at home   - Pain control  - stopped Losartan  - monitor BP
- C/w home gabapentin, oxycodone, flexeril

## 2024-10-30 NOTE — PROGRESS NOTE ADULT - PROBLEM/PLAN-5
DISPLAY PLAN FREE TEXT
Reason For Visit    KASH VALDES presents for an INR check.         History of Present Illness    Symptoms:.   The patient is currently asymptomatic.   Additional Screening:.   No. Missed dose(s).    No: Extra dose(s).   No: Significant medication changes since last visit .   No: Vitamin K dietary changes.    No: S/Sx(s) of bleeding or bruising.    No: ETOH Intake.    No: Falls/Injuries.    No: Acute Illness.    No: Procedure/Hospital/ER Visit.      Current Meds   1. Amoxicillin-Pot Clavulanate 875-125 MG Oral Tablet; Take 1 tablet twice daily for a week;   Therapy: 05Oct2015 to (Evaluate:12Oct2015)  Requested for: 05Oct2015; Last   Rx:05Oct2015 Ordered   2. Azelastine HCl - 0.1 % Nasal Solution; USE 2 SPRAYS IN EACH NOSTRIL ONCE DAILY;   Therapy: 12Nov2012 to (Evaluate:12Mar2013)  Requested for: 12Nov2012; Last   Rx:12Nov2012 Ordered   3. Baclofen 10 MG Oral Tablet; TAKE 1 TABLET BY MOUTH AT BEDTIME;   Therapy: 07Jan2013 to (Evaluate:17Jan2013)  Requested for: 07Jan2013; Last   Rx:07Jan2013 Ordered   4. Butalbital-ASA-Caff-Codeine -06-30 MG Oral Capsule; TAKE 1 CAPSULE EVERY   4 TO 6 HOURS AS NEEDED;   Therapy: 06Mar2015 to (Evaluate:20Mar2015); Last Rx:06Mar2015 Ordered   5. Carisoprodol 350 MG Oral Tablet; TAKE 1 TABLET BY MOUTH ONCE DAILY AT BEDTIME;   Therapy: 06Mar2015 to (Evaluate:67Zmn9205)  Requested for: 29Nov2016; Last   Rx:29Nov2016 Ordered   6. Cialis 20 MG Oral Tablet; 1 tablet p.o. 1-2 hours prior to sexual activity as directed;   Therapy: 01Feb2010 to (Evaluate:71Aoe1943)  Requested for: 19Oct2016; Last   Rx:19Oct2016 Ordered   7. Cialis 5 MG Oral Tablet; TAKE 2-4 TABLETS 1 HOUR BEFORE SEXUAL ACTIVITY;   Therapy: 22Feb2016 to (Evaluate:23Mar2016); Last Rx:22Feb2016 Ordered   8. Desonide 0.05 % External Cream; APPLY SPARINGLY TO AFFECTED AREAS TWICE   DAILY AS DIRECTED;   Therapy: 39Wab7891 to (Evaluate:13Nov2012)  Requested for: 14Sep2012; Last   Rx:14Sep2012 Ordered   9. Esomeprazole 
Magnesium 40 MG Oral Capsule Delayed Release (NexIUM); TAKE 1   CAPSULE BY MOUTH EVERY DAY;   Therapy: 17Wbs0462 to (Evaluate:44Lzv7083)  Requested for: 28Oct2015; Last   Rx:28Oct2015 Ordered   10. Finasteride 1 MG Oral Tablet; TAKE 1 TABLET BY MOUTH EVERY DAY;    Therapy: 03Jun2013 to (Evaluate:02Mar2017)  Requested for: 07Mar2016; Last    Rx:07Mar2016 Ordered   11. Hydrocodone-Acetaminophen 7.5-325 MG Oral Tablet; Take 1 tablet every 4-6 hours for    pain;    Therapy: 28Jun2016 to (Evaluate:17Ebm9034); Last Rx:09Cpi9250 Ordered   12. Imiquimod 5 % External Cream; APPLY TO AFFECTED AREA THREE TIMES A WEEK;    Therapy: 20Apr2015 to (Evaluate:03Gbd2131)  Requested for: 87Iha3471; Last    Rx:[id number] Ordered   13. Ketoconazole 2 % External Cream;    Therapy: (Recorded:13Onn2745) to Recorded   14. Levocetirizine Dihydrochloride 5 MG Oral Tablet; TAKE 1 TABLET BY MOUTH EVERY DAY;    Therapy: 06Ngr6422 to (Evaluate:24Qtf0857)  Requested for: 67Yjt6415; Last    Rx:86Vvc0947 Ordered   15. Mucinex D  MG Oral Tablet Extended Release 12 Hour; TAKE 1 TABLET TWICE    DAILY;    Therapy: 12Nov2012 to (Evaluate:22Nov2012); Last Rx:12Nov2012 Ordered   16. PredniSONE 20 MG Oral Tablet; TAKE 1 TABLET TWICE DAILY;    Therapy: 05Oct2015 to (Evaluate:10Oct2015)  Requested for: 05Oct2015; Last    Rx:[id number] Ordered   17. Propranolol HCl - 20 MG Oral Tablet; TAKE 1 TABLET DAILY;    Therapy: 06Jun2015 to (Evaluate:10Mar2017)  Requested for: 09Jan2017; Last    Rx:09Jan2017 Ordered   18. Qvar 80 MCG/ACT Inhalation Aerosol Solution; INHALE 2 PUFFS TWICE A DAY;    Therapy: 06Jun2015 to (Evaluate:27Cnl6105)  Requested for: 23Oct2015; Last    Rx:[id number] Ordered   19. Triamcinolone Acetonide 0.5 % External Cream; APPLY 2-3 TIMES DAILY TO AFFECTED    SKIN AREAS;    Therapy: 36Hgy8920 to (Evaluate:05Jun2015)  Requested for: 06Apr2015; Last    Rx:06Apr2015 Ordered   20. Truvada 200-300 MG Oral Tablet; TAKE 1 TABLET BY MOUTH EVERY DAY;    
Therapy: 70Ytj1309 to (Evaluate:80Gqb5663)  Requested for: 62Mrz4594; Last    Rx:[id number] Ordered   21. Ventolin  (90 Base) MCG/ACT Inhalation Aerosol Solution; INHALE TWO PUFFS    BY MOUTH FOUR TIMES DAILY FOR 2 WEEKs, THEN USE ASNEEDED;    Therapy: 04Bgc8618 to (Evaluate:04Mar1521)  Requested for: 52Mvd8855; Last    Rx:[id number] Ordered   22. Warfarin Sodium 2 MG Oral Tablet; TAKE 1 TABLET BY MOUTH EVERY DAY AS    DIRECTED;    Therapy: 24Ndg6384 to (Evaluate:60Xqj5463)  Requested for: 97Ywa9221; Last    Rx:37Mlv9212 Ordered   23. Warfarin Sodium 5 MG Oral Tablet; TAKE 1 AND 1/2 TABLETS DAILY;    Therapy: 36Gmu2564 to (Evaluate:54Gue9215)  Requested for: 79Qze7259; Last    Rx:[id number] Ordered    Results/Data  Coumadin New    ** Printed in Appendix #1 below.     Assessment   1. Anticoagulant long-term use (V58.61) (Z79.01)   · Hx of upper extremity DVT.    Indication: DVT.   Goal INR Range: 2.5-3.5.   Estimated Duration: Longterm.   INR is therapeutic today. INR 3.2.      Orders   · Prothrombin Time Flowsheet; Status:Resulted - Requires Verification;   Done:  47Tew7043 10:05AM   Performed:In Office; Due:18Mar2017;Ordered; For:Anticoagulant long-term use; Ordered By:SANJU TREVINO;      Additional Dosing Instructions: CPM.   Follow-up: 03/09/2017.    Provided patient with verbal and written dosing instructions. Pt verbalized understanding.      Counseling    Stressed importance of compliance with consistent vitamin K intake:    Stressed the importance of compliance with EXACT recommended dosage regimen:   Educated the patient on signs and symptoms of bruising/bleeding and appropriate management:   Educated on appropriate management of nosebleeds and prevention:   Cautioned patient regarding falls and appropriate action to take if serious injury occurs (especially head trauma):   Strongly advised to limit or avoid alcohol consumption; also discussed risks of INR elevation/bleeding with excessive alcohol 
consumption:   Instructed patient to notify clinic if any medication changes and/or health status changes occur prior to next appointment:   Instructed to notify clinic if any future procedures are scheduled, especially if anticoagulation treatment must be withheld:   Discussed the risk of thrombotic and bleeding complications with inappropriate use of anticoagulant:   Patient agrees to all of the above       Signatures   Electronically signed by : Rome Fagan L.P.N.; 2017 10:13AM CST    Appendix #1     Coumadin New   Patient: KASH VALDES; : 1965; MRN: [id number]      [id number]   PROTHROMBIN TIME          INR          PROTHROMBIN TIME, FINGERSTICK          INR, FINGERSTICK 3.2  2.3  3.5  2.4  2.5      CURRENT DOSE 39 mg/wk  39mg/week  39 mg/wk  39 mg/wk  39 mg week      COMMENT      Patient missed 1-2 doses  may have missed a dose    RECOMMENDED DOSE 39 mg/wk  39mg/wk  39 mg/wk  39 mg/wk  39 mg week      Warfarin Mon 5 milligrams 5 mg 5 mg 5 mg 5 mg     Warfarin Tues 7 milligrams 7 mg 7 mg 7 mg 7 mg     Warfarin Wed 5 milligrams 5 mg 5 mg 5 mg 5 mg     Warfarin Thurs 7 milligrams 9 mg 7 mg 7 mg 7 mg     Warfarin Fri 5 milligrams 5 mg 5 mg 5 mg 5 mg     Warfarin Sat 5 milligrams 5 mg 5 mg 5 mg 5 mg     Warfarin Sun 5 milligrams 5 mg 5 mg 5 mg 5 mg     
DISPLAY PLAN FREE TEXT

## 2024-10-30 NOTE — PROGRESS NOTE ADULT - PROBLEM SELECTOR PLAN 1
- Hx of Suprapubic Catheter place by IR 7/5/2024  - P/w 1 week of suprapubic pain  - CT abd/pelvis = Decompressed bladder with indwelling suprapubic catheter. No well-defined fluid collection along the catheter tract. Circumferential bladder wall thickening, nonspecific. Mild perivesicular fat stranding.   - UA grossly positive   - Pain control  - F/u urine culture   - c/w Cipro   - Urology consulted, will attempt to re-insert the catheter today
- Hx of Suprapubic Catheter place by IR 7/5/2024  - CT abd/pelvis = Decompressed bladder with indwelling suprapubic catheter. No well-defined fluid collection along the catheter tract. Circumferential bladder wall thickening, nonspecific. Mild perivesicular fat stranding.   - clogged SPC that was removed   - continues to void small amounts with low residual volume    - SPC to be placed by IR on Friday   - Pain control  - Urology following
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
## 2024-10-30 NOTE — PROGRESS NOTE ADULT - PROBLEM SELECTOR PLAN 2
- UA grossly positive  - urine culture growing Klebsiella, f/up sensitivities   - continue Cipro
- Elevated BP on admission likely d/t pain, not on BP med at home and now w/ low BP  - Pain control  - stopped Losartan  - monitor BP

## 2024-10-31 ENCOUNTER — TRANSCRIPTION ENCOUNTER (OUTPATIENT)
Age: 34
End: 2024-10-31

## 2024-10-31 VITALS
DIASTOLIC BLOOD PRESSURE: 62 MMHG | OXYGEN SATURATION: 100 % | HEART RATE: 96 BPM | RESPIRATION RATE: 18 BRPM | TEMPERATURE: 98 F | SYSTOLIC BLOOD PRESSURE: 118 MMHG

## 2024-10-31 PROCEDURE — 99232 SBSQ HOSP IP/OBS MODERATE 35: CPT

## 2024-10-31 PROCEDURE — 99239 HOSP IP/OBS DSCHRG MGMT >30: CPT

## 2024-10-31 RX ORDER — CIPROFLOXACIN LACTATE 200MG/20ML
1 VIAL (ML) INTRAVENOUS
Qty: 8 | Refills: 0
Start: 2024-10-31 | End: 2024-11-03

## 2024-10-31 RX ORDER — APIXABAN 5 MG/1
5 TABLET, FILM COATED ORAL EVERY 12 HOURS
Refills: 0 | Status: DISCONTINUED | OUTPATIENT
Start: 2024-10-31 | End: 2024-10-31

## 2024-10-31 RX ORDER — CIPROFLOXACIN LACTATE 200MG/20ML
1 VIAL (ML) INTRAVENOUS
Qty: 0 | Refills: 0 | DISCHARGE
Start: 2024-10-31

## 2024-10-31 RX ADMIN — OXYCODONE HYDROCHLORIDE 10 MILLIGRAM(S): 30 TABLET ORAL at 20:53

## 2024-10-31 RX ADMIN — Medication 650 MILLIGRAM(S): at 18:19

## 2024-10-31 RX ADMIN — OXYCODONE HYDROCHLORIDE 10 MILLIGRAM(S): 30 TABLET ORAL at 19:53

## 2024-10-31 RX ADMIN — GABAPENTIN 100 MILLIGRAM(S): 300 CAPSULE ORAL at 05:30

## 2024-10-31 RX ADMIN — OXYCODONE HYDROCHLORIDE 10 MILLIGRAM(S): 30 TABLET ORAL at 14:00

## 2024-10-31 RX ADMIN — Medication 500 MILLIGRAM(S): at 05:29

## 2024-10-31 RX ADMIN — OXYCODONE HYDROCHLORIDE 10 MILLIGRAM(S): 30 TABLET ORAL at 05:30

## 2024-10-31 RX ADMIN — Medication 500 MILLIGRAM(S): at 18:19

## 2024-10-31 RX ADMIN — Medication 325 MILLIGRAM(S): at 13:21

## 2024-10-31 RX ADMIN — Medication 650 MILLIGRAM(S): at 19:15

## 2024-10-31 RX ADMIN — CYCLOBENZAPRINE HYDROCHLORIDE 10 MILLIGRAM(S): 30 CAPSULE, EXTENDED RELEASE ORAL at 13:22

## 2024-10-31 RX ADMIN — FAMOTIDINE 20 MILLIGRAM(S): 10 INJECTION INTRAVENOUS at 13:20

## 2024-10-31 RX ADMIN — OXYCODONE HYDROCHLORIDE 10 MILLIGRAM(S): 30 TABLET ORAL at 13:20

## 2024-10-31 RX ADMIN — GABAPENTIN 100 MILLIGRAM(S): 300 CAPSULE ORAL at 21:37

## 2024-10-31 RX ADMIN — OXYCODONE HYDROCHLORIDE 10 MILLIGRAM(S): 30 TABLET ORAL at 06:30

## 2024-10-31 RX ADMIN — CYCLOBENZAPRINE HYDROCHLORIDE 10 MILLIGRAM(S): 30 CAPSULE, EXTENDED RELEASE ORAL at 21:38

## 2024-10-31 RX ADMIN — DULOXETINE HYDROCHLORIDE 60 MILLIGRAM(S): 30 CAPSULE, DELAYED RELEASE ORAL at 13:22

## 2024-10-31 RX ADMIN — CYCLOBENZAPRINE HYDROCHLORIDE 10 MILLIGRAM(S): 30 CAPSULE, EXTENDED RELEASE ORAL at 05:29

## 2024-10-31 RX ADMIN — Medication 120 MILLIGRAM(S): at 05:29

## 2024-10-31 RX ADMIN — GABAPENTIN 100 MILLIGRAM(S): 300 CAPSULE ORAL at 13:20

## 2024-10-31 RX ADMIN — LAMOTRIGINE 25 MILLIGRAM(S): 100 TABLET ORAL at 13:21

## 2024-10-31 RX ADMIN — Medication 40 MILLIGRAM(S): at 21:37

## 2024-10-31 RX ADMIN — APIXABAN 5 MILLIGRAM(S): 5 TABLET, FILM COATED ORAL at 18:19

## 2024-10-31 NOTE — DISCHARGE NOTE NURSING/CASE MANAGEMENT/SOCIAL WORK - FINANCIAL ASSISTANCE
[facility] provides services at a reduced cost to those who are determined to be eligible through [facility]’s financial assistance program. Information regarding [facility]’s financial assistance program can be found by going to [email or web address] or by calling [phone].

## 2024-10-31 NOTE — PROGRESS NOTE ADULT - SUBJECTIVE AND OBJECTIVE BOX
Patient seen and examined bedside with Dr. Le resting comfortably.  No complaints offered.   s/p SPC replacement by IR 10/31, draining clear yellow urine    T(F): 97.5 (10-31-24 @ 10:53), Max: 97.9 (10-30-24 @ 23:04)  HR: 76 (10-31-24 @ 10:53) (76 - 96)  BP: 135/81 (10-31-24 @ 10:53) (107/62 - 145/89)  RR: 17 (10-31-24 @ 10:53) (17 - 18)  SpO2: 100% (10-31-24 @ 10:53) (96% - 100%)  Wt(kg): --  CAPILLARY BLOOD GLUCOSE          PHYSICAL EXAM:  General: NAD, alert and awake  HEENT: NCAT, EOMI, conjunctiva clear  Chest: nonlabored respirations, good inspiratory effort  Abdomen: soft, NTND.   Extremities: no pedal edema or calf tenderness noted   : SPC in place, draining clear yellow urine    LABS:        I&O's Detail    30 Oct 2024 07:01  -  31 Oct 2024 07:00  --------------------------------------------------------  IN:  Total IN: 0 mL    OUT:    Indwelling Catheter - Suprapubic (mL): 650 mL  Total OUT: 650 mL    Total NET: -650 mL          Impression: 34y Male s/p         Plan: incomplete note   Patient seen and examined bedside with Dr. Le resting comfortably.  No complaints offered.   s/p SPC replacement by IR 10/31, draining clear yellow urine    T(F): 97.5 (10-31-24 @ 10:53), Max: 97.9 (10-30-24 @ 23:04)  HR: 76 (10-31-24 @ 10:53) (76 - 96)  BP: 135/81 (10-31-24 @ 10:53) (107/62 - 145/89)  RR: 17 (10-31-24 @ 10:53) (17 - 18)  SpO2: 100% (10-31-24 @ 10:53) (96% - 100%)  Wt(kg): --  CAPILLARY BLOOD GLUCOSE          PHYSICAL EXAM:  General: NAD, alert and awake  HEENT: NCAT, EOMI, conjunctiva clear  Chest: nonlabored respirations, good inspiratory effort  Abdomen: soft, NTND.   Extremities: no pedal edema or calf tenderness noted   : SPC in place, draining clear yellow urine    LABS:        I&O's Detail    30 Oct 2024 07:01  -  31 Oct 2024 07:00  --------------------------------------------------------  IN:  Total IN: 0 mL    OUT:    Indwelling Catheter - Suprapubic (mL): 650 mL  Total OUT: 650 mL    Total NET: -650 mL          Impression: 34y Male s/p SPC replacement by IR yesterday  SPC in place, draining clear yellow urine    Plan as discussed with Dr. Le:   - Patient is cleared from a  perspective for discharge  - Continue SPC care  - Continue care per primary team

## 2024-10-31 NOTE — DISCHARGE NOTE NURSING/CASE MANAGEMENT/SOCIAL WORK - PATIENT PORTAL LINK FT
You can access the FollowMyHealth Patient Portal offered by [facility] by registering at the following website: [email or web address]. By joining [facility]’s FollowMyHealth portal, you will also be able to view your health information using other applications (apps) compatible with our system.

## 2024-10-31 NOTE — DISCHARGE NOTE NURSING/CASE MANAGEMENT/SOCIAL WORK - NSDCPEFALRISK_GEN_ALL_CORE
For information on Fall & Injury Prevention, visit: [email or web address] OR  [email or web address] OR  https://www.cdc.gov/steadi/patient.html

## 2024-10-31 NOTE — DISCHARGE NOTE PROVIDER - ATTENDING DISCHARGE PHYSICAL EXAMINATION:
GENERAL: NAD  EYES: EOMI. Conjunctiva and sclera clear  NECK: Supple  CHEST/LUNG: Clear to auscultation bilaterally; No wheeze, rhonchi, rales  HEART: Regular rate and rhythm; No murmurs  ABDOMEN: Soft, non-tender, non-distended; +SPC; Bowel sounds present  EXTREMITIES: No edema, no clubbing or cyanosis   NEURO: AAOx3, lower extremity paraplegia   SKIN: No rashes or lesions

## 2024-10-31 NOTE — DISCHARGE NOTE PROVIDER - NSDCMRMEDTOKEN_GEN_ALL_CORE_FT
ciprofloxacin 500 mg oral tablet: 1 tab(s) orally every 12 hours for 4 more days  Colace 100 mg oral capsule: 2 cap(s) orally 2 times a day  cyclobenzaprine 10 mg oral tablet: 1 tab(s) orally every 8 hours  DULoxetine 60 mg oral delayed release capsule: 1 cap(s) orally once a day  Eliquis 5 mg oral tablet: 1 tab(s) orally 2 times a day  ferrous sulfate 325 mg (65 mg elemental iron) oral tablet: 1 tab(s) orally once a day  LaMICtal 25 mg oral tablet: 1 tab(s) orally once a day  Lipitor 40 mg oral tablet: 1 tab(s) orally once a day  Melatonin 3 mg oral tablet: 1 tab(s) orally once a day (at bedtime)  Neurontin 100 mg oral capsule: 1 cap(s) orally every 8 hours  oxyCODONE 10 mg oral tablet: 1 tab(s) orally every 6 hours as needed for  severe pain  Pepcid 20 mg oral tablet: 1 tab(s) orally once a day  Senna-gen 8.6 mg oral tablet: 1 tab(s) orally 2 times a day   ciprofloxacin 500 mg oral tablet: 1 tab(s) orally every 12 hours  Colace 100 mg oral capsule: 2 cap(s) orally 2 times a day  cyclobenzaprine 10 mg oral tablet: 1 tab(s) orally every 8 hours  DULoxetine 60 mg oral delayed release capsule: 1 cap(s) orally once a day  Eliquis 5 mg oral tablet: 1 tab(s) orally 2 times a day  ferrous sulfate 325 mg (65 mg elemental iron) oral tablet: 1 tab(s) orally once a day  LaMICtal 25 mg oral tablet: 1 tab(s) orally once a day  Lipitor 40 mg oral tablet: 1 tab(s) orally once a day  Melatonin 3 mg oral tablet: 1 tab(s) orally once a day (at bedtime)  Neurontin 100 mg oral capsule: 1 cap(s) orally every 8 hours  oxyCODONE 10 mg oral tablet: 1 tab(s) orally every 6 hours as needed for  severe pain  Pepcid 20 mg oral tablet: 1 tab(s) orally once a day  Senna-gen 8.6 mg oral tablet: 1 tab(s) orally 2 times a day

## 2024-10-31 NOTE — PROGRESS NOTE ADULT - REASON FOR ADMISSION
Suprapubic catheter dysfunction
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
## 2024-10-31 NOTE — DISCHARGE NOTE PROVIDER - NSDCCPCAREPLAN_GEN_ALL_CORE_FT
PRINCIPAL DISCHARGE DIAGNOSIS  Diagnosis: Suprapubic catheter dysfunction  Assessment and Plan of Treatment: Suprapubic catheter re-inserted by IR 10/30/24 and draining well.      SECONDARY DISCHARGE DIAGNOSES  Diagnosis: Acute UTI  Assessment and Plan of Treatment: Urine culture growing Klebsiella. Complete course of Cipro.    Diagnosis: Chronic complete spastic paraplegia  Assessment and Plan of Treatment: Continue gabapentin, oxycodone, flexeril.    Diagnosis: Chronic deep vein thrombosis (DVT)  Assessment and Plan of Treatment: Continue Eliquis

## 2024-11-06 DIAGNOSIS — T14.90XS INJURY, UNSPECIFIED, SEQUELA: ICD-10-CM

## 2024-11-06 DIAGNOSIS — N39.0 URINARY TRACT INFECTION, SITE NOT SPECIFIED: ICD-10-CM
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
## 2024-11-06 DIAGNOSIS — R73.03 PREDIABETES: ICD-10-CM

## 2024-11-06 DIAGNOSIS — Z95.828 PRESENCE OF OTHER VASCULAR IMPLANTS AND GRAFTS: ICD-10-CM

## 2024-11-06 DIAGNOSIS — Z93.3 COLOSTOMY STATUS: ICD-10-CM
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
## 2024-11-06 DIAGNOSIS — I16.0 HYPERTENSIVE URGENCY: ICD-10-CM

## 2024-11-06 DIAGNOSIS — Z88.8 ALLERGY STATUS TO OTHER DRUGS, MEDICAMENTS AND BIOLOGICAL SUBSTANCES: ICD-10-CM

## 2024-11-06 DIAGNOSIS — Z86.718 PERSONAL HISTORY OF OTHER VENOUS THROMBOSIS AND EMBOLISM: ICD-10-CM

## 2024-11-06 DIAGNOSIS — G82.21 PARAPLEGIA, COMPLETE: ICD-10-CM

## 2024-11-06 DIAGNOSIS — Z79.01 LONG TERM (CURRENT) USE OF ANTICOAGULANTS: ICD-10-CM

## 2024-11-06 DIAGNOSIS — T83.091A OTHER MECHANICAL COMPLICATION OF INDWELLING URETHRAL CATHETER, INITIAL ENCOUNTER: ICD-10-CM

## 2024-11-06 DIAGNOSIS — B96.1 KLEBSIELLA PNEUMONIAE [K. PNEUMONIAE] AS THE CAUSE OF DISEASES CLASSIFIED ELSEWHERE: ICD-10-CM
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
## 2024-11-22 ENCOUNTER — EMERGENCY (EMERGENCY)
Facility: HOSPITAL | Age: 34
LOS: 0 days | Discharge: ANOTHER HOSPITAL | End: 2024-11-23
Attending: STUDENT IN AN ORGANIZED HEALTH CARE EDUCATION/TRAINING PROGRAM
Payer: MEDICAID

## 2024-11-22 VITALS
RESPIRATION RATE: 17 BRPM | HEIGHT: 74 IN | WEIGHT: 279.99 LBS | SYSTOLIC BLOOD PRESSURE: 172 MMHG | OXYGEN SATURATION: 100 % | DIASTOLIC BLOOD PRESSURE: 122 MMHG | TEMPERATURE: 98 F | HEART RATE: 89 BPM

## 2024-11-22 DIAGNOSIS — G89.29 OTHER CHRONIC PAIN: ICD-10-CM

## 2024-11-22 DIAGNOSIS — Z95.828 PRESENCE OF OTHER VASCULAR IMPLANTS AND GRAFTS: Chronic | ICD-10-CM

## 2024-11-22 DIAGNOSIS — T83.010A BREAKDOWN (MECHANICAL) OF CYSTOSTOMY CATHETER, INITIAL ENCOUNTER: ICD-10-CM

## 2024-11-22 DIAGNOSIS — M54.9 DORSALGIA, UNSPECIFIED: ICD-10-CM

## 2024-11-22 DIAGNOSIS — Z88.4 ALLERGY STATUS TO ANESTHETIC AGENT: ICD-10-CM

## 2024-11-22 DIAGNOSIS — G82.20 PARAPLEGIA, UNSPECIFIED: ICD-10-CM

## 2024-11-22 PROCEDURE — 99285 EMERGENCY DEPT VISIT HI MDM: CPT

## 2024-11-22 NOTE — ED ADULT NURSE NOTE - CHIEF COMPLAINT QUOTE
JUWAN from [facility]. Pt c/o suprapubic catheter is out since 19:30. Pmhx BPH, Anemia Major depressive disorder. Pressure ulcer sacral, buttock Acute embolism on Eliquis, paraplegic . Allergy Lidocaine patch

## 2024-11-22 NOTE — ED ADULT NURSE NOTE - OBJECTIVE STATEMENT
BIBEMS from [facility] c/o dislodged suprapubic cathteter that happened earlier tonight. Denies fevers, chills, N/V/D. Pt has chronic back pain. Pmhx BPH, Anemia Major depressive disorder. Pressure ulcer sacral, buttock Acute embolism on Eliquis, paraplegic . Allergy Lidocaine patch

## 2024-11-22 NOTE — ED ADULT TRIAGE NOTE - CHIEF COMPLAINT QUOTE
BIBEMS from [facility]. Pt c/o suprapubic catheter not draining since 19:30. Pmhx BPH, Anemia Major depressive disorder. Pressure ulcer sscral, buttock Acute embolism on Eliquis, paraplegic . Allergy Lidocaine patch JUWAN from [facility]. Pt c/o suprapubic catheter is out since 19:30. Pmhx BPH, Anemia Major depressive disorder. Pressure ulcer sacral, buttock Acute embolism on Eliquis, paraplegic . Allergy Lidocaine patch

## 2024-11-23 ENCOUNTER — INPATIENT (INPATIENT)
Facility: HOSPITAL | Age: 34
LOS: 1 days | Discharge: NURSING, REHAB OR LONG-TERM CARE | End: 2024-11-25
Attending: STUDENT IN AN ORGANIZED HEALTH CARE EDUCATION/TRAINING PROGRAM | Admitting: STUDENT IN AN ORGANIZED HEALTH CARE EDUCATION/TRAINING PROGRAM
Payer: MEDICAID

## 2024-11-23 VITALS
WEIGHT: 250 LBS | SYSTOLIC BLOOD PRESSURE: 165 MMHG | DIASTOLIC BLOOD PRESSURE: 75 MMHG | HEIGHT: 74 IN | OXYGEN SATURATION: 97 % | RESPIRATION RATE: 18 BRPM | TEMPERATURE: 98 F | HEART RATE: 81 BPM

## 2024-11-23 VITALS
RESPIRATION RATE: 19 BRPM | TEMPERATURE: 98 F | HEART RATE: 94 BPM | SYSTOLIC BLOOD PRESSURE: 174 MMHG | OXYGEN SATURATION: 98 % | DIASTOLIC BLOOD PRESSURE: 118 MMHG

## 2024-11-23 DIAGNOSIS — R23.4 CHANGES IN SKIN TEXTURE: ICD-10-CM

## 2024-11-23 DIAGNOSIS — R25.2 CRAMP AND SPASM: ICD-10-CM

## 2024-11-23 DIAGNOSIS — I82.409 ACUTE EMBOLISM AND THROMBOSIS OF UNSPECIFIED DEEP VEINS OF UNSPECIFIED LOWER EXTREMITY: ICD-10-CM

## 2024-11-23 DIAGNOSIS — T83.010A BREAKDOWN (MECHANICAL) OF CYSTOSTOMY CATHETER, INITIAL ENCOUNTER: ICD-10-CM

## 2024-11-23 DIAGNOSIS — G89.4 CHRONIC PAIN SYNDROME: ICD-10-CM

## 2024-11-23 DIAGNOSIS — Z93.3 COLOSTOMY STATUS: Chronic | ICD-10-CM

## 2024-11-23 DIAGNOSIS — Z95.828 PRESENCE OF OTHER VASCULAR IMPLANTS AND GRAFTS: Chronic | ICD-10-CM

## 2024-11-23 LAB
ALBUMIN SERPL ELPH-MCNC: 3.4 G/DL — SIGNIFICANT CHANGE UP (ref 3.3–5)
ALP SERPL-CCNC: 94 U/L — SIGNIFICANT CHANGE UP (ref 40–120)
ALT FLD-CCNC: 36 U/L — SIGNIFICANT CHANGE UP (ref 12–78)
ANION GAP SERPL CALC-SCNC: 4 MMOL/L — LOW (ref 5–17)
AST SERPL-CCNC: 42 U/L — HIGH (ref 15–37)
BASOPHILS # BLD AUTO: 0.04 K/UL — SIGNIFICANT CHANGE UP (ref 0–0.2)
BASOPHILS NFR BLD AUTO: 0.6 % — SIGNIFICANT CHANGE UP (ref 0–2)
BILIRUB SERPL-MCNC: 0.3 MG/DL — SIGNIFICANT CHANGE UP (ref 0.2–1.2)
BUN SERPL-MCNC: 9 MG/DL — SIGNIFICANT CHANGE UP (ref 7–23)
CALCIUM SERPL-MCNC: 9.5 MG/DL — SIGNIFICANT CHANGE UP (ref 8.5–10.1)
CHLORIDE SERPL-SCNC: 104 MMOL/L — SIGNIFICANT CHANGE UP (ref 96–108)
CO2 SERPL-SCNC: 29 MMOL/L — SIGNIFICANT CHANGE UP (ref 22–31)
CREAT SERPL-MCNC: 0.76 MG/DL — SIGNIFICANT CHANGE UP (ref 0.5–1.3)
EGFR: 122 ML/MIN/1.73M2 — SIGNIFICANT CHANGE UP
EOSINOPHIL # BLD AUTO: 0.29 K/UL — SIGNIFICANT CHANGE UP (ref 0–0.5)
EOSINOPHIL NFR BLD AUTO: 4.3 % — SIGNIFICANT CHANGE UP (ref 0–6)
GLUCOSE SERPL-MCNC: 110 MG/DL — HIGH (ref 70–99)
HCT VFR BLD CALC: 35.1 % — LOW (ref 39–50)
HGB BLD-MCNC: 11.2 G/DL — LOW (ref 13–17)
IMM GRANULOCYTES NFR BLD AUTO: 0.1 % — SIGNIFICANT CHANGE UP (ref 0–0.9)
LYMPHOCYTES # BLD AUTO: 2.12 K/UL — SIGNIFICANT CHANGE UP (ref 1–3.3)
LYMPHOCYTES # BLD AUTO: 31.2 % — SIGNIFICANT CHANGE UP (ref 13–44)
MCHC RBC-ENTMCNC: 26.2 PG — LOW (ref 27–34)
MCHC RBC-ENTMCNC: 31.9 G/DL — LOW (ref 32–36)
MCV RBC AUTO: 82 FL — SIGNIFICANT CHANGE UP (ref 80–100)
MONOCYTES # BLD AUTO: 0.56 K/UL — SIGNIFICANT CHANGE UP (ref 0–0.9)
MONOCYTES NFR BLD AUTO: 8.2 % — SIGNIFICANT CHANGE UP (ref 2–14)
NEUTROPHILS # BLD AUTO: 3.78 K/UL — SIGNIFICANT CHANGE UP (ref 1.8–7.4)
NEUTROPHILS NFR BLD AUTO: 55.6 % — SIGNIFICANT CHANGE UP (ref 43–77)
NRBC # BLD: 0 /100 WBCS — SIGNIFICANT CHANGE UP (ref 0–0)
PLATELET # BLD AUTO: 315 K/UL — SIGNIFICANT CHANGE UP (ref 150–400)
POTASSIUM SERPL-MCNC: 4.1 MMOL/L — SIGNIFICANT CHANGE UP (ref 3.5–5.3)
POTASSIUM SERPL-SCNC: 4.1 MMOL/L — SIGNIFICANT CHANGE UP (ref 3.5–5.3)
PROT SERPL-MCNC: 9 GM/DL — HIGH (ref 6–8.3)
RBC # BLD: 4.28 M/UL — SIGNIFICANT CHANGE UP (ref 4.2–5.8)
RBC # FLD: 14.2 % — SIGNIFICANT CHANGE UP (ref 10.3–14.5)
SODIUM SERPL-SCNC: 137 MMOL/L — SIGNIFICANT CHANGE UP (ref 135–145)
WBC # BLD: 6.8 K/UL — SIGNIFICANT CHANGE UP (ref 3.8–10.5)
WBC # FLD AUTO: 6.8 K/UL — SIGNIFICANT CHANGE UP (ref 3.8–10.5)

## 2024-11-23 PROCEDURE — 51705 CHANGE OF BLADDER TUBE: CPT

## 2024-11-23 PROCEDURE — 75984 XRAY CONTROL CATHETER CHANGE: CPT | Mod: 26

## 2024-11-23 PROCEDURE — 99223 1ST HOSP IP/OBS HIGH 75: CPT

## 2024-11-23 PROCEDURE — 72192 CT PELVIS W/O DYE: CPT | Mod: 26

## 2024-11-23 PROCEDURE — 99285 EMERGENCY DEPT VISIT HI MDM: CPT

## 2024-11-23 RX ORDER — LAMOTRIGINE 50 MG/1
25 TABLET, EXTENDED RELEASE ORAL DAILY
Refills: 0 | Status: DISCONTINUED | OUTPATIENT
Start: 2024-11-23 | End: 2024-11-25

## 2024-11-23 RX ORDER — CYCLOBENZAPRINE HCL 10 MG
10 TABLET ORAL EVERY 8 HOURS
Refills: 0 | Status: DISCONTINUED | OUTPATIENT
Start: 2024-11-23 | End: 2024-11-25

## 2024-11-23 RX ORDER — ACETAMINOPHEN, DIPHENHYDRAMINE HCL, PHENYLEPHRINE HCL 325; 25; 5 MG/1; MG/1; MG/1
6 TABLET ORAL AT BEDTIME
Refills: 0 | Status: DISCONTINUED | OUTPATIENT
Start: 2024-11-23 | End: 2024-11-25

## 2024-11-23 RX ORDER — OXYCODONE HYDROCHLORIDE 30 MG/1
10 TABLET ORAL EVERY 6 HOURS
Refills: 0 | Status: DISCONTINUED | OUTPATIENT
Start: 2024-11-23 | End: 2024-11-25

## 2024-11-23 RX ORDER — CYCLOBENZAPRINE HYDROCHLORIDE 30 MG/1
5 CAPSULE, EXTENDED RELEASE ORAL ONCE
Refills: 0 | Status: COMPLETED | OUTPATIENT
Start: 2024-11-23 | End: 2024-11-23

## 2024-11-23 RX ORDER — MAGNESIUM, ALUMINUM HYDROXIDE 200-225/5
30 SUSPENSION, ORAL (FINAL DOSE FORM) ORAL EVERY 4 HOURS
Refills: 0 | Status: DISCONTINUED | OUTPATIENT
Start: 2024-11-23 | End: 2024-11-25

## 2024-11-23 RX ORDER — GABAPENTIN 300 MG/1
100 CAPSULE ORAL EVERY 8 HOURS
Refills: 0 | Status: DISCONTINUED | OUTPATIENT
Start: 2024-11-23 | End: 2024-11-25

## 2024-11-23 RX ORDER — ONDANSETRON HYDROCHLORIDE 4 MG/1
4 TABLET, FILM COATED ORAL EVERY 8 HOURS
Refills: 0 | Status: DISCONTINUED | OUTPATIENT
Start: 2024-11-23 | End: 2024-11-25

## 2024-11-23 RX ORDER — ACETAMINOPHEN 500MG 500 MG/1
650 TABLET, COATED ORAL EVERY 6 HOURS
Refills: 0 | Status: DISCONTINUED | OUTPATIENT
Start: 2024-11-23 | End: 2024-11-25

## 2024-11-23 RX ORDER — FAMOTIDINE 20 MG/1
20 TABLET, FILM COATED ORAL DAILY
Refills: 0 | Status: DISCONTINUED | OUTPATIENT
Start: 2024-11-23 | End: 2024-11-25

## 2024-11-23 RX ORDER — SENNOSIDES 8.6 MG
2 TABLET ORAL AT BEDTIME
Refills: 0 | Status: DISCONTINUED | OUTPATIENT
Start: 2024-11-23 | End: 2024-11-25

## 2024-11-23 RX ORDER — FERROUS SULFATE 325(65) MG
325 TABLET ORAL DAILY
Refills: 0 | Status: DISCONTINUED | OUTPATIENT
Start: 2024-11-23 | End: 2024-11-25

## 2024-11-23 RX ORDER — DULOXETINE HCL 60 MG
60 CAPSULE,DELAYED RELEASE (ENTERIC COATED) ORAL DAILY
Refills: 0 | Status: DISCONTINUED | OUTPATIENT
Start: 2024-11-23 | End: 2024-11-25

## 2024-11-23 RX ADMIN — Medication 60 MILLIGRAM(S): at 06:39

## 2024-11-23 RX ADMIN — ACETAMINOPHEN, DIPHENHYDRAMINE HCL, PHENYLEPHRINE HCL 6 MILLIGRAM(S): 325; 25; 5 TABLET ORAL at 21:58

## 2024-11-23 RX ADMIN — LAMOTRIGINE 25 MILLIGRAM(S): 50 TABLET, EXTENDED RELEASE ORAL at 06:39

## 2024-11-23 RX ADMIN — Medication 40 MILLIGRAM(S): at 21:58

## 2024-11-23 RX ADMIN — OXYCODONE HYDROCHLORIDE 10 MILLIGRAM(S): 30 TABLET ORAL at 22:59

## 2024-11-23 RX ADMIN — OXYCODONE HYDROCHLORIDE 10 MILLIGRAM(S): 30 TABLET ORAL at 21:59

## 2024-11-23 RX ADMIN — Medication 10 MILLIGRAM(S): at 13:34

## 2024-11-23 RX ADMIN — GABAPENTIN 100 MILLIGRAM(S): 300 CAPSULE ORAL at 15:27

## 2024-11-23 RX ADMIN — GABAPENTIN 100 MILLIGRAM(S): 300 CAPSULE ORAL at 06:39

## 2024-11-23 RX ADMIN — FAMOTIDINE 20 MILLIGRAM(S): 20 TABLET, FILM COATED ORAL at 15:27

## 2024-11-23 RX ADMIN — Medication 10 MILLIGRAM(S): at 06:39

## 2024-11-23 RX ADMIN — GABAPENTIN 100 MILLIGRAM(S): 300 CAPSULE ORAL at 22:40

## 2024-11-23 RX ADMIN — OXYCODONE HYDROCHLORIDE 10 MILLIGRAM(S): 30 TABLET ORAL at 09:03

## 2024-11-23 RX ADMIN — CYCLOBENZAPRINE HYDROCHLORIDE 5 MILLIGRAM(S): 30 CAPSULE, EXTENDED RELEASE ORAL at 00:42

## 2024-11-23 RX ADMIN — OXYCODONE HYDROCHLORIDE 10 MILLIGRAM(S): 30 TABLET ORAL at 15:55

## 2024-11-23 RX ADMIN — Medication 325 MILLIGRAM(S): at 15:27

## 2024-11-23 RX ADMIN — Medication 10 MILLIGRAM(S): at 21:58
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
## 2024-11-23 NOTE — ED PROVIDER NOTE - PROGRESS NOTE DETAILS
Kathi PGY3: Patient was transferred with no ultrasound IV.  Although no need for repeat labs at this time, offered to place an IV line and patient, patient declined.  He understands that he may need an IV for daily labs and IR suprapubic catheter exchange. Kathi PGY3: Multiple attempts made over 1-2 hours to obtain US IV line. Patient in contorted position (leaning in exaggerated R lateral decubitus position) due to urethral spasms despite medicating and re-attempted IV line with no success. Patient unable to lay on his back.

## 2024-11-23 NOTE — H&P ADULT - MUSCULOSKELETAL
no joint swelling/no joint warmth/no calf tenderness/strength 5/5 bilateral upper extremities details…

## 2024-11-23 NOTE — PROVIDER CONTACT NOTE (MEDICATION) - RECOMMENDATIONS
provider aware. plan to be determined provider aware. no meds to be ordered at this time. repeat BP in one hour

## 2024-11-23 NOTE — PROVIDER CONTACT NOTE (MEDICATION) - ASSESSMENT
pt is A&0x4 pt is A&0x4. pt complaining of spam rizwana, which he says normally brings up his blood pressure. Patient states he does not take any BP meds at home.

## 2024-11-23 NOTE — ED PROVIDER NOTE - CLINICAL SUMMARY MEDICAL DECISION MAKING FREE TEXT BOX
33 y/o M w/ pmh of paraplegia (chronic suprapubic catheter) p/w dislodged catheter. coming in from [facility], states it was accidentally tugged on, now not draining urine. denies any pain. endorses chronic back pain, requesting percocet flexiril. per chart review, pt has had multiple IR exchanges of catheter, states original catheter was placed by IR. multiple attmepts have been done by urology to place catheter, however due to multiple strictures, unable everytime and pt states he only gets procedure done by IR due to past failure, refusing bedside procedure    exam findings above, no signs of urinary retention. urology consulted, states no IR available all weekend. requested transfer to [facility] for IR. IR not available for overnight emergent consult, spoke to ER at [facility], will transfer to ER for eventual inpatient admission and can get non emergent IR consult in AM. pt comfortable w plan for DC

## 2024-11-23 NOTE — H&P ADULT - NSHPPHYSICALEXAM_GEN_ALL_CORE
Vital Signs Last 24 Hrs  T(C): 36.5 (23 Nov 2024 02:31), Max: 36.7 (22 Nov 2024 22:12)  T(F): 97.7 (23 Nov 2024 02:31), Max: 98 (22 Nov 2024 22:12)  HR: 81 (23 Nov 2024 02:31) (81 - 94)  BP: 165/75 (23 Nov 2024 02:31) (165/75 - 174/118)  BP(mean): --  RR: 18 (23 Nov 2024 02:31) (17 - 19)  SpO2: 97% (23 Nov 2024 02:31) (97% - 100%)    Parameters below as of 23 Nov 2024 02:31  Patient On (Oxygen Delivery Method): room air

## 2024-11-23 NOTE — ED ADULT TRIAGE NOTE - PATIENT ON (OXYGEN DELIVERY METHOD)
Pt is needing work letter dates changed pt was seen on 07/20/2023 not 07/21/2023 Pt will come into office to  letter. room air
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
## 2024-11-23 NOTE — H&P ADULT - NSHPLABSRESULTS_GEN_ALL_CORE
.    -Personally reviewed the following labs below:                        11.2   6.80  )-----------( 315      ( 23 Nov 2024 00:10 )             35.1   11-23    137  |  104  |  9   ----------------------------<  110[H]  4.1   |  29  |  0.76    Ca    9.5      23 Nov 2024 00:10    TPro  9.0[H]  /  Alb  3.4  /  TBili  0.3  /  DBili  x   /  AST  42[H]  /  ALT  36  /  AlkPhos  94  11-23

## 2024-11-23 NOTE — H&P ADULT - TIME BILLING
Reviewed admission imaging reports, and admission labs prior to the encounter with  the patient   Reviewed Triage and ED course/ documentation   Reviewed Transfer documentation from facility   Performed full physical exam and R  Obtained list of multiple home medications and performed home medications reconciliation on EMR  Discussed prognosis, treatment and further IR plan with the patient  Ordered or reviewed multiple new admission medications and placed or reviewed all hospitalization admission orders  Managed (continued, held or adjusted doses) multiple  home medications   Ordered  or reviewed orders of  new imaging and new lab w/up  Requested new consultations including::: IR, Wound care

## 2024-11-23 NOTE — H&P ADULT - PROBLEM SELECTOR PLAN 1
2 days of no suprapubic catheter output with the urine drainage around catheter site and partially via  urethra  Worsening dislodgment of suprapubic catheter today     -Strict I/O once catheter in place  -Send UA/ Cx once cathter in place   -US Bladder q8h to monitor for retention   -IR c/s requested for replacement of the catheter   -Monitor for fever q4h   -Pt on Eliquis, please make IR team aware (Held for  now)

## 2024-11-23 NOTE — ED ADULT NURSE REASSESSMENT NOTE - NS ED NURSE REASSESS COMMENT FT1
Multiple attempts made by ED MD Armenta for USIV access however access was unable to be obtained due to small vasculature and pt's positional limitations. Per pt he usually gets a midline placed for hospital admissions. Report given to [facility] RN Yisel who will check with their manager to verify if the pt can go to [facility]. Pt medicated as ordered, NAD noted. Pt offers no complaints at this time. Safety maintained.

## 2024-11-23 NOTE — PROCEDURE NOTE - PROCEDURE FINDINGS AND DETAILS
Status post successful insertion of a 3Fr/22G US guided IV in the left anticubital fossa for administration of Cipro. Plan for removal of the IV after completion of the IV antibiotics was discussed with provider Andrae Funes.   Status post successful replacement of a 14French suprapubic catheter with a sample sent for gram stain and culture. The catheter was sutured to the skin and dry sterile dressing was applied.
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
## 2024-11-23 NOTE — ED PROVIDER NOTE - PHYSICAL EXAMINATION
General: No acute distress, mentation at baseline,  well nourished, well developed  HEENT: NCAT, Neck supple without meningismus, PERRL, no conjunctival injection  Lungs: CTAB, No wheeze or crackles, No retractions, No increased work of breathing  Heart: S1S2 RRR, No M/R/G, Pules equal Bilaterally in upper and lower extremities distally  Abd: soft, NT/ND, No guarding, No rebound.  No hernias, no palpable masses. suprapubic catehter 10cm dislodegd, not draining, urine draining around stoma, no tenderness erythema purulence  Extrem: FROM in all joints, no gross deformities appreciated, no significant edema noted, No ulcers. Cap refil < 2sec.  Skin: No rash noted, warm dry.  Neuro:  Grossly normal.   No focal deficits.  Psychiatric: Appropriate mood and affect.

## 2024-11-23 NOTE — ED ADULT NURSE NOTE - OBJECTIVE STATEMENT
Pt received to room 13 a/o x 3 as a transfer from [place] for IR. pt c/o dislodged suprapubic catheter. pt states he was washing himself when he accidently pulled on it and felt it come out a little. Pt states he has pain and pressure to suprapubic area. the OSH wasn't able to put suprapubic catheter back in. Respirations even and unlabored. Lung sounds clear with equal chest rise bilaterally. ABD is soft,   tender, non distended with normal active bowel sounds. No complaints of chest pain, headache, nausea, dizziness, vomiting  SOB, fever, chills verbalized.

## 2024-11-23 NOTE — ED ADULT NURSE NOTE - NSFALLRISKINTERV_ED_ALL_ED

## 2024-11-23 NOTE — ED PROCEDURE NOTE - PROCEDURE ADDITIONAL DETAILS
Emergency Department Focused Ultrasound performed at patient's bedside for educational purposes. The study will have a follow up study performed or was performed in the direct supervision of an ultrasound trained attending.    Bladder Volume: 163 mL
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
## 2024-11-23 NOTE — ED ADULT NURSE REASSESSMENT NOTE - NS ED NURSE REASSESS COMMENT FT1
Unable to obtain urine sample d/t dislodgement of suprapubic catheter. Suprapubic catheter not replaced here will be done at [facility].

## 2024-11-24 PROCEDURE — 99232 SBSQ HOSP IP/OBS MODERATE 35: CPT

## 2024-11-24 RX ORDER — KETOROLAC TROMETHAMINE 30 MG/ML
10 INJECTION INTRAMUSCULAR; INTRAVENOUS ONCE
Refills: 0 | Status: DISCONTINUED | OUTPATIENT
Start: 2024-11-24 | End: 2024-11-24

## 2024-11-24 RX ORDER — APIXABAN 2.5 MG/1
5 TABLET, FILM COATED ORAL EVERY 12 HOURS
Refills: 0 | Status: DISCONTINUED | OUTPATIENT
Start: 2024-11-24 | End: 2024-11-25

## 2024-11-24 RX ADMIN — Medication 10 MILLIGRAM(S): at 13:26

## 2024-11-24 RX ADMIN — OXYCODONE HYDROCHLORIDE 10 MILLIGRAM(S): 30 TABLET ORAL at 11:56

## 2024-11-24 RX ADMIN — GABAPENTIN 100 MILLIGRAM(S): 300 CAPSULE ORAL at 05:09

## 2024-11-24 RX ADMIN — OXYCODONE HYDROCHLORIDE 10 MILLIGRAM(S): 30 TABLET ORAL at 18:58

## 2024-11-24 RX ADMIN — Medication 325 MILLIGRAM(S): at 10:11

## 2024-11-24 RX ADMIN — FAMOTIDINE 20 MILLIGRAM(S): 20 TABLET, FILM COATED ORAL at 10:12

## 2024-11-24 RX ADMIN — KETOROLAC TROMETHAMINE 10 MILLIGRAM(S): 30 INJECTION INTRAMUSCULAR; INTRAVENOUS at 10:11

## 2024-11-24 RX ADMIN — OXYCODONE HYDROCHLORIDE 10 MILLIGRAM(S): 30 TABLET ORAL at 17:58

## 2024-11-24 RX ADMIN — Medication 40 MILLIGRAM(S): at 21:33

## 2024-11-24 RX ADMIN — GABAPENTIN 100 MILLIGRAM(S): 300 CAPSULE ORAL at 21:34

## 2024-11-24 RX ADMIN — APIXABAN 5 MILLIGRAM(S): 2.5 TABLET, FILM COATED ORAL at 17:58

## 2024-11-24 RX ADMIN — GABAPENTIN 100 MILLIGRAM(S): 300 CAPSULE ORAL at 13:26

## 2024-11-24 RX ADMIN — OXYCODONE HYDROCHLORIDE 10 MILLIGRAM(S): 30 TABLET ORAL at 06:51

## 2024-11-24 RX ADMIN — ACETAMINOPHEN, DIPHENHYDRAMINE HCL, PHENYLEPHRINE HCL 6 MILLIGRAM(S): 325; 25; 5 TABLET ORAL at 21:34

## 2024-11-24 RX ADMIN — LAMOTRIGINE 25 MILLIGRAM(S): 50 TABLET, EXTENDED RELEASE ORAL at 10:12

## 2024-11-24 RX ADMIN — OXYCODONE HYDROCHLORIDE 10 MILLIGRAM(S): 30 TABLET ORAL at 05:51

## 2024-11-24 RX ADMIN — OXYCODONE HYDROCHLORIDE 10 MILLIGRAM(S): 30 TABLET ORAL at 12:56

## 2024-11-24 RX ADMIN — Medication 60 MILLIGRAM(S): at 10:11

## 2024-11-24 RX ADMIN — Medication 10 MILLIGRAM(S): at 21:33

## 2024-11-24 RX ADMIN — Medication 10 MILLIGRAM(S): at 05:09

## 2024-11-24 RX ADMIN — KETOROLAC TROMETHAMINE 10 MILLIGRAM(S): 30 INJECTION INTRAMUSCULAR; INTRAVENOUS at 11:11

## 2024-11-24 NOTE — PATIENT PROFILE ADULT - FALL HARM RISK - HARM RISK INTERVENTIONS

## 2024-11-24 NOTE — DIETITIAN INITIAL EVALUATION ADULT - PERTINENT LABORATORY DATA
11-23    137  |  104  |  9   ----------------------------<  110[H]  4.1   |  29  |  0.76    Ca    9.5      23 Nov 2024 00:10    TPro  9.0[H]  /  Alb  3.4  /  TBili  0.3  /  DBili  x   /  AST  42[H]  /  ALT  36  /  AlkPhos  94  11-23  A1C with Estimated Average Glucose Result: 6.0 % (08-28-24 @ 06:50)
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
## 2024-11-24 NOTE — DIETITIAN INITIAL EVALUATION ADULT - NSFNSPHYEXAMSKINFT_GEN_A_CORE
Pressure Injury 1: Left:, buttock, Stage III  Pressure Injury 2: none, none  Pressure Injury 3: none, none  Pressure Injury 4: none, none  Pressure Injury 5: none, none  Pressure Injury 6: none, none  Pressure Injury 7: none, none  Pressure Injury 8: none, none  Pressure Injury 9: none, none  Pressure Injury 10: none, none  Pressure Injury 11: none, none, Pressure Injury 1: Left:, buttocks, Stage III  Pressure Injury 2: none, none  Pressure Injury 3: none, none  Pressure Injury 4: none, none  Pressure Injury 5: none, none  Pressure Injury 6: none, none  Pressure Injury 7: none, none  Pressure Injury 8: none, none  Pressure Injury 9: none, none  Pressure Injury 10: none, none  Pressure Injury 11: none, none

## 2024-11-24 NOTE — DIETITIAN INITIAL EVALUATION ADULT - PERTINENT MEDS FT
MEDICATIONS  (STANDING):  atorvastatin 40 milliGRAM(s) Oral at bedtime  cyclobenzaprine 10 milliGRAM(s) Oral every 8 hours  DULoxetine 60 milliGRAM(s) Oral daily  famotidine    Tablet 20 milliGRAM(s) Oral daily  ferrous    sulfate 325 milliGRAM(s) Oral daily  gabapentin 100 milliGRAM(s) Oral every 8 hours  lamoTRIgine 25 milliGRAM(s) Oral daily  melatonin 6 milliGRAM(s) Oral at bedtime  senna 2 Tablet(s) Oral at bedtime    MEDICATIONS  (PRN):  acetaminophen     Tablet .. 650 milliGRAM(s) Oral every 6 hours PRN Temp greater or equal to 38C (100.4F), Mild Pain (1 - 3)  aluminum hydroxide/magnesium hydroxide/simethicone Suspension 30 milliLiter(s) Oral every 4 hours PRN Dyspepsia  ondansetron Injectable 4 milliGRAM(s) IV Push every 8 hours PRN Nausea and/or Vomiting  oxyCODONE    IR 10 milliGRAM(s) Oral every 6 hours PRN for severe pain

## 2024-11-24 NOTE — PROGRESS NOTE ADULT - SUBJECTIVE AND OBJECTIVE BOX
PROGRESS NOTE:     Patient is a 34y old  Male who presents with a chief complaint of Suprapubic catheter malfunction - Transfer from [facility] (23 Nov 2024 17:12)      SUBJECTIVE / OVERNIGHT EVENTS:    ADDITIONAL REVIEW OF SYSTEMS:    MEDICATIONS  (STANDING):  atorvastatin 40 milliGRAM(s) Oral at bedtime  cyclobenzaprine 10 milliGRAM(s) Oral every 8 hours  DULoxetine 60 milliGRAM(s) Oral daily  famotidine    Tablet 20 milliGRAM(s) Oral daily  ferrous    sulfate 325 milliGRAM(s) Oral daily  gabapentin 100 milliGRAM(s) Oral every 8 hours  lamoTRIgine 25 milliGRAM(s) Oral daily  melatonin 6 milliGRAM(s) Oral at bedtime  senna 2 Tablet(s) Oral at bedtime    MEDICATIONS  (PRN):  acetaminophen     Tablet .. 650 milliGRAM(s) Oral every 6 hours PRN Temp greater or equal to 38C (100.4F), Mild Pain (1 - 3)  aluminum hydroxide/magnesium hydroxide/simethicone Suspension 30 milliLiter(s) Oral every 4 hours PRN Dyspepsia  ondansetron Injectable 4 milliGRAM(s) IV Push every 8 hours PRN Nausea and/or Vomiting  oxyCODONE    IR 10 milliGRAM(s) Oral every 6 hours PRN for severe pain      CAPILLARY BLOOD GLUCOSE        I&O's Summary    23 Nov 2024 07:01  -  24 Nov 2024 07:00  --------------------------------------------------------  IN: 0 mL / OUT: 950 mL / NET: -950 mL    24 Nov 2024 07:01  -  24 Nov 2024 08:52  --------------------------------------------------------  IN: 0 mL / OUT: 510 mL / NET: -510 mL        PHYSICAL EXAM:  Vital Signs Last 24 Hrs  T(C): 36.3 (24 Nov 2024 05:10), Max: 37.5 (23 Nov 2024 10:31)  T(F): 97.4 (24 Nov 2024 05:10), Max: 99.5 (23 Nov 2024 10:31)  HR: 97 (24 Nov 2024 05:10) (76 - 106)  BP: 125/87 (24 Nov 2024 05:10) (102/67 - 226/144)  BP(mean): --  RR: 18 (24 Nov 2024 05:10) (18 - 20)  SpO2: 97% (24 Nov 2024 05:10) (97% - 100%)    Parameters below as of 24 Nov 2024 05:10  Patient On (Oxygen Delivery Method): room air        CONSTITUTIONAL: NAD, well-developed  RESPIRATORY: Normal respiratory effort; lungs are clear to auscultation bilaterally  CARDIOVASCULAR: Regular rate and rhythm, normal S1 and S2, no murmur/rub/gallop; No lower extremity edema; Peripheral pulses are 2+ bilaterally  ABDOMEN: Nontender to palpation, normoactive bowel sounds, no rebound/guarding; No hepatosplenomegaly  MUSCLOSKELETAL: no clubbing or cyanosis of digits; no joint swelling or tenderness to palpation  PSYCH: A+O to person, place, and time; affect appropriate  NEURO:  SKIN:    LABS:                        11.2   6.80  )-----------( 315      ( 23 Nov 2024 00:10 )             35.1     11-23    137  |  104  |  9   ----------------------------<  110[H]  4.1   |  29  |  0.76    Ca    9.5      23 Nov 2024 00:10    TPro  9.0[H]  /  Alb  3.4  /  TBili  0.3  /  DBili  x   /  AST  42[H]  /  ALT  36  /  AlkPhos  94  11-23          Urinalysis Basic - ( 23 Nov 2024 00:10 )    Color: x / Appearance: x / SG: x / pH: x  Gluc: 110 mg/dL / Ketone: x  / Bili: x / Urobili: x   Blood: x / Protein: x / Nitrite: x   Leuk Esterase: x / RBC: x / WBC x   Sq Epi: x / Non Sq Epi: x / Bacteria: x        Culture - Acid Fast - Urine (collected 23 Nov 2024 17:34)  Source: .Urine        RADIOLOGY & ADDITIONAL TESTS:  Results Reviewed:   Imaging Personally Reviewed:  Electrocardiogram Personally Reviewed:    COORDINATION OF CARE:  Care Discussed with Consultants/Other Providers [Y/N]:  Prior or Outpatient Records Reviewed [Y/N]:   PROGRESS NOTE:     Patient is a 34y old  Male who presents with a chief complaint of Suprapubic catheter malfunction - Transfer from [facility] (23 Nov 2024 17:12)      SUBJECTIVE / OVERNIGHT EVENTS:  NAEON.  s/p catheter replacement by IR 11/23.  Reports improvement in pain.  Labs and vitals reviewed.  No fever, chill, nausea/vomiting  pending culture results  reports that he lives at [address]    ADDITIONAL REVIEW OF SYSTEMS:    MEDICATIONS  (STANDING):  atorvastatin 40 milliGRAM(s) Oral at bedtime  cyclobenzaprine 10 milliGRAM(s) Oral every 8 hours  DULoxetine 60 milliGRAM(s) Oral daily  famotidine    Tablet 20 milliGRAM(s) Oral daily  ferrous    sulfate 325 milliGRAM(s) Oral daily  gabapentin 100 milliGRAM(s) Oral every 8 hours  lamoTRIgine 25 milliGRAM(s) Oral daily  melatonin 6 milliGRAM(s) Oral at bedtime  senna 2 Tablet(s) Oral at bedtime    MEDICATIONS  (PRN):  acetaminophen     Tablet .. 650 milliGRAM(s) Oral every 6 hours PRN Temp greater or equal to 38C (100.4F), Mild Pain (1 - 3)  aluminum hydroxide/magnesium hydroxide/simethicone Suspension 30 milliLiter(s) Oral every 4 hours PRN Dyspepsia  ondansetron Injectable 4 milliGRAM(s) IV Push every 8 hours PRN Nausea and/or Vomiting  oxyCODONE    IR 10 milliGRAM(s) Oral every 6 hours PRN for severe pain      CAPILLARY BLOOD GLUCOSE        I&O's Summary    23 Nov 2024 07:01  -  24 Nov 2024 07:00  --------------------------------------------------------  IN: 0 mL / OUT: 950 mL / NET: -950 mL    24 Nov 2024 07:01  -  24 Nov 2024 08:52  --------------------------------------------------------  IN: 0 mL / OUT: 510 mL / NET: -510 mL        PHYSICAL EXAM:  Vital Signs Last 24 Hrs  T(C): 36.3 (24 Nov 2024 05:10), Max: 37.5 (23 Nov 2024 10:31)  T(F): 97.4 (24 Nov 2024 05:10), Max: 99.5 (23 Nov 2024 10:31)  HR: 97 (24 Nov 2024 05:10) (76 - 106)  BP: 125/87 (24 Nov 2024 05:10) (102/67 - 226/144)  BP(mean): --  RR: 18 (24 Nov 2024 05:10) (18 - 20)  SpO2: 97% (24 Nov 2024 05:10) (97% - 100%)    Parameters below as of 24 Nov 2024 05:10  Patient On (Oxygen Delivery Method): room air        CONSTITUTIONAL: NAD, obese   RESPIRATORY: Normal respiratory effort; lungs are clear to auscultation bilaterally  CARDIOVASCULAR: Regular rate and rhythm, normal S1 and S2, no murmur/rub/gallop; No lower extremity edema; Peripheral pulses are 2+ bilaterally  ABDOMEN: Nontender to palpation, normoactive bowel sounds, no rebound/guarding; No hepatosplenomegaly  -+cath noted   MUSCLOSKELETAL: no clubbing or cyanosis of digits; no joint swelling or tenderness to palpation  PSYCH: A+O to person, place, and time; affect appropriate      LABS:                        11.2   6.80  )-----------( 315      ( 23 Nov 2024 00:10 )             35.1     11-23    137  |  104  |  9   ----------------------------<  110[H]  4.1   |  29  |  0.76    Ca    9.5      23 Nov 2024 00:10    TPro  9.0[H]  /  Alb  3.4  /  TBili  0.3  /  DBili  x   /  AST  42[H]  /  ALT  36  /  AlkPhos  94  11-23          Urinalysis Basic - ( 23 Nov 2024 00:10 )    Color: x / Appearance: x / SG: x / pH: x  Gluc: 110 mg/dL / Ketone: x  / Bili: x / Urobili: x   Blood: x / Protein: x / Nitrite: x   Leuk Esterase: x / RBC: x / WBC x   Sq Epi: x / Non Sq Epi: x / Bacteria: x        Culture - Acid Fast - Urine (collected 23 Nov 2024 17:34)  Source: .Urine        RADIOLOGY & ADDITIONAL TESTS:  Results Reviewed:   Imaging Personally Reviewed:  Electrocardiogram Personally Reviewed:    COORDINATION OF CARE:  Care Discussed with Consultants/Other Providers [Y/N]:  Prior or Outpatient Records Reviewed [Y/N]:

## 2024-11-25 ENCOUNTER — TRANSCRIPTION ENCOUNTER (OUTPATIENT)
Age: 34
End: 2024-11-25

## 2024-11-25 VITALS
DIASTOLIC BLOOD PRESSURE: 64 MMHG | HEART RATE: 80 BPM | OXYGEN SATURATION: 100 % | SYSTOLIC BLOOD PRESSURE: 123 MMHG | TEMPERATURE: 97 F | RESPIRATION RATE: 18 BRPM

## 2024-11-25 PROCEDURE — 99239 HOSP IP/OBS DSCHRG MGMT >30: CPT

## 2024-11-25 PROCEDURE — 99222 1ST HOSP IP/OBS MODERATE 55: CPT

## 2024-11-25 RX ADMIN — GABAPENTIN 100 MILLIGRAM(S): 300 CAPSULE ORAL at 13:31

## 2024-11-25 RX ADMIN — Medication 60 MILLIGRAM(S): at 13:30

## 2024-11-25 RX ADMIN — Medication 10 MILLIGRAM(S): at 05:12

## 2024-11-25 RX ADMIN — OXYCODONE HYDROCHLORIDE 10 MILLIGRAM(S): 30 TABLET ORAL at 12:21

## 2024-11-25 RX ADMIN — FAMOTIDINE 20 MILLIGRAM(S): 20 TABLET, FILM COATED ORAL at 13:30

## 2024-11-25 RX ADMIN — APIXABAN 5 MILLIGRAM(S): 2.5 TABLET, FILM COATED ORAL at 05:12

## 2024-11-25 RX ADMIN — OXYCODONE HYDROCHLORIDE 10 MILLIGRAM(S): 30 TABLET ORAL at 13:21

## 2024-11-25 RX ADMIN — Medication 10 MILLIGRAM(S): at 13:31

## 2024-11-25 RX ADMIN — Medication 325 MILLIGRAM(S): at 13:30

## 2024-11-25 RX ADMIN — GABAPENTIN 100 MILLIGRAM(S): 300 CAPSULE ORAL at 05:12

## 2024-11-25 RX ADMIN — OXYCODONE HYDROCHLORIDE 10 MILLIGRAM(S): 30 TABLET ORAL at 01:01

## 2024-11-25 RX ADMIN — OXYCODONE HYDROCHLORIDE 10 MILLIGRAM(S): 30 TABLET ORAL at 06:11

## 2024-11-25 RX ADMIN — OXYCODONE HYDROCHLORIDE 10 MILLIGRAM(S): 30 TABLET ORAL at 00:01

## 2024-11-25 RX ADMIN — LAMOTRIGINE 25 MILLIGRAM(S): 50 TABLET, EXTENDED RELEASE ORAL at 13:31

## 2024-11-25 RX ADMIN — OXYCODONE HYDROCHLORIDE 10 MILLIGRAM(S): 30 TABLET ORAL at 07:11

## 2024-11-25 NOTE — CONSULT NOTE ADULT - SUBJECTIVE AND OBJECTIVE BOX
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  4 days ago, patient started noticing that he was having difficulty flushing suprapubic catheter. 2 days ago, patient stopped having suprapubic Massey catheter output, but started having urine draining around suprapubic catheter site and passing through the penis with associated spasms/pain.  Yesterday, while cleaning site, patient accidentally pulled suprapubic catheter additional 1-2 cm further out.  Catheter is no longer flushing and is not having any urine output. He reports no fever, chills, cough, CP, or SOB. Of note, per transfer documentation from the facility the pt is on regular consistency diet with thin fluids.  (23 Nov 2024 05:23)    Wound consult requested to assist w/ management of left buttock pressure injury. Per patient he receives wound care regularly at his nursing facility. Wound is packed with gauze twice a week. He is insensate. To his knowledge there has not been excess and/or purulent drainage. Reports mild abdominal pain that is improving since admission, consistent with previous symptoms of UTI. Denies CP, SOB, n/v, fever, chills, diarrhea.      Current Diet: Diet, Regular:   DASH/TLC Sodium & Cholesterol Restricted (DASH) (11-23-24 @ 06:04)      PAST MEDICAL & SURGICAL HISTORY:  Paraplegia  Colostomy s/p reversal ?  VTE (venous thromboembolism)  S/P IVC filter      REVIEW OF SYSTEMS: General, skin,  see above.  All other systems negative.    MEDICATIONS  (STANDING):  apixaban 5 milliGRAM(s) Oral every 12 hours  atorvastatin 40 milliGRAM(s) Oral at bedtime  cyclobenzaprine 10 milliGRAM(s) Oral every 8 hours  DULoxetine 60 milliGRAM(s) Oral daily  famotidine    Tablet 20 milliGRAM(s) Oral daily  ferrous    sulfate 325 milliGRAM(s) Oral daily  gabapentin 100 milliGRAM(s) Oral every 8 hours  lamoTRIgine 25 milliGRAM(s) Oral daily  melatonin 6 milliGRAM(s) Oral at bedtime  senna 2 Tablet(s) Oral at bedtime    MEDICATIONS  (PRN):  acetaminophen     Tablet .. 650 milliGRAM(s) Oral every 6 hours PRN Temp greater or equal to 38C (100.4F), Mild Pain (1 - 3)  aluminum hydroxide/magnesium hydroxide/simethicone Suspension 30 milliLiter(s) Oral every 4 hours PRN Dyspepsia  ondansetron Injectable 4 milliGRAM(s) IV Push every 8 hours PRN Nausea and/or Vomiting  oxyCODONE    IR 10 milliGRAM(s) Oral every 6 hours PRN for severe pain      Allergies    Lidocaine 4% Patch (Blisters)    Intolerances    lactose (Diarrhea)      SOCIAL HISTORY:  Resident of [facility], (+) paraplegic, wheelchair bound.  Denies smoking, etoh, illicit drug use.    FAMILY HISTORY:  No pertinent family history in first degree relatives        PHYSICAL EXAM:  Vital Signs Last 24 Hrs  T(C): 36.3 (25 Nov 2024 10:35), Max: 37.1 (25 Nov 2024 05:07)  T(F): 97.4 (25 Nov 2024 10:35), Max: 98.8 (25 Nov 2024 05:07)  HR: 80 (25 Nov 2024 10:35) (71 - 91)  BP: 123/64 (25 Nov 2024 10:35) (100/70 - 138/64)  BP(mean): --  RR: 18 (25 Nov 2024 10:35) (17 - 18)  SpO2: 100% (25 Nov 2024 10:35) (96% - 100%)    Parameters below as of 25 Nov 2024 05:07  Patient On (Oxygen Delivery Method): room air    Wt: 113.4 kg (11-)    Constitutional: NAD, A&Ox4  (+) low airloss support surface, (+) fluidized positioning devices, (+) complete cair boots  HEENT:  NC/AT, nonicteric, mucosa moist  Cardiovascular: rate regular   Respiratory: nonlabored, room air, equal chest expansion.  Gastrointestinal: healed abdominal surgical scars, soft NT/ND, incontinent formed stool; perineal care provided.  : suprapubic catheter.  Neurology:  (+) paraplegic  Musculoskeletal: b/l UE and trunk with (+)aROM, b/l le flaccid in extension.  Vascular: BLE (+) xerosis, multiple healed scars to bilateral feet, equally warm, (+)2 dp/pt pulses, trace edema bilaterally, capillary refill < 3 seconds.  Skin:  moist w/ good turgor  Sacrum to bilateral buttocks and ischium multiple healed full thickness wounds, now with large soft tissue defects, which has created deep creases/folds. Within these areas there is excess moisture; left buttock area with linear fissure; friable with cleansing. No purulent drainage noted.    LABS/ CULTURES/ RADIOLOGY:      137  |  104  |  9   ----------------------------<  110      [11-23-24 @ 00:10]  4.1   |  29  |  0.76        Ca     9.5     [11-23-24 @ 00:10]          ACC: [id number] EXAM:  CT PELVIS ONLY   ORDERED BY: MARTIN EAST     PROCEDURE DATE:  11/23/2024          INTERPRETATION:  CLINICAL INFORMATION: Suprapubic catheter malfunction.    COMPARISON: CT abdomen pelvis dated 10/28/2024.    CONTRAST/COMPLICATIONS:  IV Contrast: NONE  Oral Contrast: NONE      PROCEDURE:  CT of the Pelvis was performed.  Sagittal and coronal reformats were performed.    FINDINGS:  BLADDER: Mild circumferential bladder wall thickening with perivesicular   stranding and small stable diverticulum along the right anterior bladder   wall (303-68). Interval dislodgment of a suprapubic catheter, which now   resides in the anterior abdominal wall.  REPRODUCTIVE ORGANS: Mildly enlarged prostate.  LYMPH NODES: Multifocal prominent bilateral inguinal lymph nodes are   unchanged from 10/28/2024    VISUALIZED PORTIONS:  ABDOMINAL ORGANS: Within normal limits.  BOWEL: Left-sided colonic anastomosis. There is a large amount stool   throughout the visualized colon.  PERITONEUM/RETROPERITONEUM: Within normal limits.  VESSELS: Within normal limits.  ABDOMINAL WALL: Retracted suprapubic catheter, as described above.  BONES: Left femoral neck fixation screw is unchanged. Chronic dysplastic   change of the bilateral hips and visualized right femur. Absent distal   sacrum/coccyx. Scarring within the bilateral buttock extending to the   bilateral ischium with increased sclerosis and flattening bilaterally.   The increased sclerosis is greater on the left. No fluid collection   identified within limitations of noncontrast technique.    IMPRESSION:  Dislodged suprapubic catheter terminating in the anterior abdominal wall.    Mild circumferential bladder wall thickening. Correlate with urinalysis.    Probable chronic collapsed bilateral decubitus ulcers with remodeling of   the bilateral ischium and increased sclerosis, likely sequelae of chronic   osteomyelitis. No discrete fluid collection identified.    --- End of Report ---          INOCENTE VENTURA MD; Resident Radiologist  This document has been electronically signed.  MALLORIE DEL ROSARIO MD; Attending Radiologist  This document has been electronically signed. 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  4 days ago, patient started noticing that he was having difficulty flushing suprapubic catheter. 2 days ago, patient stopped having suprapubic Massey catheter output, but started having urine draining around suprapubic catheter site and passing through the penis with associated spasms/pain.  Yesterday, while cleaning site, patient accidentally pulled suprapubic catheter additional 1-2 cm further out.  Catheter is no longer flushing and is not having any urine output. He reports no fever, chills, cough, CP, or SOB. Of note, per transfer documentation from the facility the pt is on regular consistency diet with thin fluids.  (23 Nov 2024 05:23)    Wound consult requested to assist w/ management of left buttock pressure injury. Per patient he receives wound care regularly at his nursing facility. Wound is packed with gauze twice a week. He is insensate. To his knowledge there has not been excess and/or purulent drainage. Reports mild abdominal pain that is improving since admission, consistent with previous symptoms of UTI. Denies CP, SOB, n/v, fever, chills, diarrhea.      Current Diet: Diet, Regular:   DASH/TLC Sodium & Cholesterol Restricted (DASH) (11-23-24 @ 06:04)      PAST MEDICAL & SURGICAL HISTORY:  Paraplegia  Colostomy s/p reversal ?  VTE (venous thromboembolism)  S/P IVC filter      REVIEW OF SYSTEMS: General, skin,  see above.  All other systems negative.    MEDICATIONS  (STANDING):  apixaban 5 milliGRAM(s) Oral every 12 hours  atorvastatin 40 milliGRAM(s) Oral at bedtime  cyclobenzaprine 10 milliGRAM(s) Oral every 8 hours  DULoxetine 60 milliGRAM(s) Oral daily  famotidine    Tablet 20 milliGRAM(s) Oral daily  ferrous    sulfate 325 milliGRAM(s) Oral daily  gabapentin 100 milliGRAM(s) Oral every 8 hours  lamoTRIgine 25 milliGRAM(s) Oral daily  melatonin 6 milliGRAM(s) Oral at bedtime  senna 2 Tablet(s) Oral at bedtime    MEDICATIONS  (PRN):  acetaminophen     Tablet .. 650 milliGRAM(s) Oral every 6 hours PRN Temp greater or equal to 38C (100.4F), Mild Pain (1 - 3)  aluminum hydroxide/magnesium hydroxide/simethicone Suspension 30 milliLiter(s) Oral every 4 hours PRN Dyspepsia  ondansetron Injectable 4 milliGRAM(s) IV Push every 8 hours PRN Nausea and/or Vomiting  oxyCODONE    IR 10 milliGRAM(s) Oral every 6 hours PRN for severe pain      Allergies    Lidocaine 4% Patch (Blisters)    Intolerances    lactose (Diarrhea)      SOCIAL HISTORY:  Resident of [facility], (+) paraplegic, wheelchair bound.  Denies smoking, etoh, illicit drug use.    FAMILY HISTORY:  No pertinent family history in first degree relatives        PHYSICAL EXAM:  Vital Signs Last 24 Hrs  T(C): 36.3 (25 Nov 2024 10:35), Max: 37.1 (25 Nov 2024 05:07)  T(F): 97.4 (25 Nov 2024 10:35), Max: 98.8 (25 Nov 2024 05:07)  HR: 80 (25 Nov 2024 10:35) (71 - 91)  BP: 123/64 (25 Nov 2024 10:35) (100/70 - 138/64)  BP(mean): --  RR: 18 (25 Nov 2024 10:35) (17 - 18)  SpO2: 100% (25 Nov 2024 10:35) (96% - 100%)    Parameters below as of 25 Nov 2024 05:07  Patient On (Oxygen Delivery Method): room air    Wt: 113.4 kg (11-)    Constitutional: NAD, A&Ox4  (+) low airloss support surface, (+) fluidized positioning devices, (+) complete cair boots  HEENT:  NC/AT, nonicteric, mucosa moist  Cardiovascular: rate regular   Respiratory: nonlabored, room air, equal chest expansion.  Gastrointestinal: healed abdominal surgical scars, soft NT/ND, incontinent formed stool; perineal care provided.  : suprapubic catheter.  Neurology:  (+) paraplegic  Musculoskeletal: b/l UE and trunk with (+)aROM, b/l le flaccid in extension.  Vascular: BLE (+) xerosis, multiple healed scars to bilateral feet, equally warm, (+)2 dp pulses, trace edema bilaterally, capillary refill < 3 seconds.  Skin:  moist w/ good turgor  Sacrum to bilateral buttocks and ischium multiple healed full thickness wounds, now with large soft tissue defects, which has created deep creases/folds. Within these areas there is excess moisture; left buttock area with linear fissure; friable with cleansing. No purulent drainage noted.    LABS/ CULTURES/ RADIOLOGY:      137  |  104  |  9   ----------------------------<  110      [11-23-24 @ 00:10]  4.1   |  29  |  0.76        Ca     9.5     [11-23-24 @ 00:10]          ACC: [id number] EXAM:  CT PELVIS ONLY   ORDERED BY: MARTIN EAST     PROCEDURE DATE:  11/23/2024          INTERPRETATION:  CLINICAL INFORMATION: Suprapubic catheter malfunction.    COMPARISON: CT abdomen pelvis dated 10/28/2024.    CONTRAST/COMPLICATIONS:  IV Contrast: NONE  Oral Contrast: NONE      PROCEDURE:  CT of the Pelvis was performed.  Sagittal and coronal reformats were performed.    FINDINGS:  BLADDER: Mild circumferential bladder wall thickening with perivesicular   stranding and small stable diverticulum along the right anterior bladder   wall (303-68). Interval dislodgment of a suprapubic catheter, which now   resides in the anterior abdominal wall.  REPRODUCTIVE ORGANS: Mildly enlarged prostate.  LYMPH NODES: Multifocal prominent bilateral inguinal lymph nodes are   unchanged from 10/28/2024    VISUALIZED PORTIONS:  ABDOMINAL ORGANS: Within normal limits.  BOWEL: Left-sided colonic anastomosis. There is a large amount stool   throughout the visualized colon.  PERITONEUM/RETROPERITONEUM: Within normal limits.  VESSELS: Within normal limits.  ABDOMINAL WALL: Retracted suprapubic catheter, as described above.  BONES: Left femoral neck fixation screw is unchanged. Chronic dysplastic   change of the bilateral hips and visualized right femur. Absent distal   sacrum/coccyx. Scarring within the bilateral buttock extending to the   bilateral ischium with increased sclerosis and flattening bilaterally.   The increased sclerosis is greater on the left. No fluid collection   identified within limitations of noncontrast technique.    IMPRESSION:  Dislodged suprapubic catheter terminating in the anterior abdominal wall.    Mild circumferential bladder wall thickening. Correlate with urinalysis.    Probable chronic collapsed bilateral decubitus ulcers with remodeling of   the bilateral ischium and increased sclerosis, likely sequelae of chronic   osteomyelitis. No discrete fluid collection identified.    --- End of Report ---          INOCENTE VENTURA MD; Resident Radiologist  This document has been electronically signed.  MALLORIE DEL ROSARIO MD; Attending Radiologist  This document has been electronically signed. Nov 23 2024  3:14PM

## 2024-11-25 NOTE — DISCHARGE NOTE PROVIDER - NSDCCPTREATMENT_GEN_ALL_CORE_FT
PRINCIPAL PROCEDURE  Procedure: CT of pelvis without contrast  Findings and Treatment: CT of the Pelvis was performed.  Sagittal and coronal reformats were performed.  FINDINGS:  BLADDER: Mild circumferential bladder wall thickening with perivesicular   stranding and small stable diverticulum along the right anterior bladder   wall (303-68). Interval dislodgment of a suprapubic catheter, which now   resides in the anterior abdominal wall.  REPRODUCTIVE ORGANS: Mildly enlarged prostate.  LYMPH NODES: Multifocal prominent bilateral inguinal lymph nodes are   unchanged from 10/28/2024  VISUALIZED PORTIONS:  ABDOMINAL ORGANS: Within normal limits.  BOWEL: Left-sided colonic anastomosis. There is a large amount stool   throughout the visualized colon.  PERITONEUM/RETROPERITONEUM: Within normal limits.  VESSELS: Within normal limits.  ABDOMINAL WALL: Retracted suprapubic catheter, as described above.  BONES: Left femoral neck fixation screw is unchanged. Chronic dysplastic   change of the bilateral hips and visualized right femur. Absent distal   sacrum/coccyx. Scarring within the bilateral buttock extending to the   bilateral ischium with increased sclerosis and flattening bilaterally.   The increased sclerosis is greater on the left. No fluid collection   identified within limitations of noncontrast technique.  IMPRESSION:  Dislodged suprapubic catheter terminating in the anterior abdominal wall.  Mild circumferential bladder wall thickening. Correlate with urinalysis.  Probable chronic collapsed bilateral decubitus ulcers with remodeling of   the bilateral ischium and increased sclerosis, likely sequelae of chronic   osteomyelitis. No discrete fluid collection identified.
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
## 2024-11-25 NOTE — DISCHARGE NOTE PROVIDER - NSDCHC_MEDRECSTATUS_GEN_ALL_CORE
Vaccine Information Sheet, \"Influenza - Inactivated\"  given to Noel Beltrán, or parent/legal guardian of  Noel Beltrán and verbalized understanding. Patient responses:    Have you ever had a reaction to a flu vaccine? No  Are you able to eat eggs without adverse effects? Yes  Do you have any current illness? No  Have you ever had [place] Syndrome? No    Flu vaccine given per order. Please see immunization tab. Admission Reconciliation is Completed  Discharge Reconciliation is Completed

## 2024-11-25 NOTE — DISCHARGE NOTE PROVIDER - NSDCCPCAREPLAN_GEN_ALL_CORE_FT
PRINCIPAL DISCHARGE DIAGNOSIS  Diagnosis: Suprapubic catheter dysfunction  Assessment and Plan of Treatment: You were admitted with dysfunction of your suprapubic catheter, which was replaced.  We noted growth of bacteria from a culture taken of your urine. Given absence of a clinical infection (no elevated white blood cell count or fever, no symptoms similar to your prior UTIs), it was deemed to be a colonization with bacteria rather than an active infection. If you notice any symptoms of fever, chills, weakness, or lower abdominal burning or pain, please address this with your facility and consider urinary testing. If you feel persistent symptoms, please return to the hospital for treatment with antibiotics.

## 2024-11-25 NOTE — DISCHARGE NOTE PROVIDER - ATTENDING DISCHARGE PHYSICAL EXAMINATION:
PHYSICAL EXAM:  Vital Signs Last 24 Hrs  T(C): 36.3 (25 Nov 2024 10:35), Max: 37.1 (25 Nov 2024 05:07)  T(F): 97.4 (25 Nov 2024 10:35), Max: 98.8 (25 Nov 2024 05:07)  HR: 80 (25 Nov 2024 10:35) (71 - 91)  BP: 123/64 (25 Nov 2024 10:35) (100/70 - 142/78)  BP(mean): --  RR: 18 (25 Nov 2024 10:35) (17 - 18)  SpO2: 100% (25 Nov 2024 10:35) (95% - 100%)    Parameters below as of 25 Nov 2024 05:07  Patient On (Oxygen Delivery Method): room air      CONSTITUTIONAL: NAD, well-groomed  EYES: PERRLA; conjunctiva and sclera clear  ENMT: Moist oral mucosa, no pharyngeal injection or exudates  NECK: Supple, no palpable masses; no thyromegaly  RESPIRATORY: Normal respiratory effort; lungs are clear to auscultation bilaterally  CARDIOVASCULAR: Regular rate and rhythm, normal S1 and S2, no murmur/rub/gallop; No lower extremity edema; Peripheral pulses are 2+ bilaterally  ABDOMEN: Nontender to palpation, normoactive bowel sounds, no rebound/guarding; No hepatosplenomegaly  MUSCULOSKELETAL: no clubbing or cyanosis of digits; no joint swelling or tenderness to palpation; 0/5 strength b/l LEs  PSYCH: A+O to person, place, and time; affect appropriate  NEUROLOGY: CN 2-12 are intact and symmetric; loss of bilateral LE sensation  SKIN: No rashes; no palpable lesions  : suprapubic catheter without surrounding warmth, erythema, or tenderness

## 2024-11-25 NOTE — CONSULT NOTE ADULT - REASON FOR ADMISSION
Suprapubic catheter malfunction - Transfer from [facility] VS
Suprapubic catheter malfunction - Transfer from [facility] VS

## 2024-11-25 NOTE — DISCHARGE NOTE NURSING/CASE MANAGEMENT/SOCIAL WORK - FINANCIAL ASSISTANCE
[facility] provides services at a reduced cost to those who are determined to be eligible through [facility]’s financial assistance program. Information regarding [facility]’s financial assistance program can be found by going to [email or web address] or by calling [phone].

## 2024-11-25 NOTE — DISCHARGE NOTE NURSING/CASE MANAGEMENT/SOCIAL WORK - PATIENT PORTAL LINK FT
You can access the FollowMyHealth Patient Portal offered by [facility] by registering at the following website: [email or web address]. By joining [facility]’s FollowMyHealth portal, you will also be able to view your health information using other applications (apps) compatible with our system.

## 2024-11-25 NOTE — PROGRESS NOTE ADULT - PROBLEM SELECTOR PLAN 2
Chronic L buttock fissure with a tunnel  Wound care evaluation appreciated
Chronic L buttock fissure with a tunnel    -Wound care c/s requested
Chronic L buttock fissure with a tunnel    -Wound care c/s requested

## 2024-11-25 NOTE — PROGRESS NOTE ADULT - PROBLEM SELECTOR PROBLEM 1
Suprapubic catheter dysfunction, initial encounter

## 2024-11-25 NOTE — DISCHARGE NOTE PROVIDER - CARE PROVIDER_API CALL
Sebas Romo  Cardiology  [id number]  [place] [address]  Phone: ([phone]  Fax: ([phone]  Follow Up Time:

## 2024-11-25 NOTE — DISCHARGE NOTE PROVIDER - NSDCMRMEDTOKEN_GEN_ALL_CORE_FT
Colace 100 mg oral capsule: 2 cap(s) orally 2 times a day  cyclobenzaprine 10 mg oral tablet: 1 tab(s) orally every 8 hours  DULoxetine 60 mg oral delayed release capsule: 1 cap(s) orally once a day  Eliquis 5 mg oral tablet: 1 tab(s) orally 2 times a day  ferrous sulfate 325 mg (65 mg elemental iron) oral tablet: 1 tab(s) orally once a day  LaMICtal 25 mg oral tablet: 1 tab(s) orally once a day  Lipitor 40 mg oral tablet: 1 tab(s) orally once a day  Melatonin 3 mg oral tablet: 2 tab(s) orally once a day (at bedtime)  Neurontin 100 mg oral capsule: 1 cap(s) orally every 8 hours  oxyCODONE 10 mg oral tablet: 1 tab(s) orally every 6 hours as needed for  severe pain  Pepcid 20 mg oral tablet: 1 tab(s) orally once a day  Senna-gen 8.6 mg oral tablet: 1 tab(s) orally 2 times a day

## 2024-11-25 NOTE — PROGRESS NOTE ADULT - REASON FOR ADMISSION
Suprapubic catheter malfunction - Transfer from [facility] VS
Suprapubic catheter malfunction - Transfer from [facility] VS
Suprapubic catheter malfunction - Transfer from [facility] VS

## 2024-11-25 NOTE — DISCHARGE NOTE NURSING/CASE MANAGEMENT/SOCIAL WORK - NSDCPEFALRISK_GEN_ALL_CORE
For information on Fall & Injury Prevention, visit: [email or web address] OR  [email or web address] OR  https://www.cdc.gov/steadi/patient.html
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
## 2024-11-25 NOTE — PROGRESS NOTE ADULT - TIME BILLING
Time-based billing (NON-critical care).     35 minutes spent on total encounter; more than 50% of the visit was spent counseling and / or coordinating care by the attending physician.  The necessity of the time spent during the encounter on this date of service was due to:     review of laboratory data, radiology results, consultants' recommendations, documentation in [place], discussion with patient, wound care team, and interdisciplinary staff (such as , social workers, etc). Interventions were performed as documented above.
Time-based billing (NON-critical care).     The necessity of the time spent during the encounter on this date of service was due to:     - Ordering, reviewing, and interpreting labs, testing, and imaging.  - Independently obtaining a review of systems and performing a physical exam  - Reviewing prior hospitalization and where necessary, outpatient records.  - Counselling and educating patient and family regarding interpretation of aforementioned items and plan of care.
The necessity of the time spent during the encounter on this date of service was due to:   - Direct patient care ( interview and independently obtaining a review of systems and performing a physical exam)and documentation  - Ordering, reviewing, and interpreting labs, testing, and imaging   - Reviewing prior hospitalization and where necessary, outpatient records.  - Discussion with consultants, other providers, support staff  - Counselling and educating patient and family regarding interpretation of aforementioned items and plan of care.

## 2024-11-25 NOTE — PROGRESS NOTE ADULT - PROBLEM SELECTOR PLAN 1
2 days of no suprapubic catheter output with the urine drainage around catheter site and partially via  urethra  Worsening dislodgment of suprapubic catheter today     -Strict I/O once catheter in place  -Send UA/ Cx once catheter in place - sent and pending.  -US Bladder q8h to monitor for retention   -IR s/p replacement of the catheter   -Monitor for fever q4h   will resume eliquis 11/24 now s/p catheter replacement.
2 days of no suprapubic catheter output with the urine drainage around catheter site and partially via  urethra  Worsening dislodgment of suprapubic catheter today     -Strict I/O once catheter in place  -Send UA/ Cx once catheter in place - sent and pending.  -US Bladder q8h to monitor for retention   -IR s/p replacement of the catheter   -Monitor for fever q4h   will resume eliquis 11/24 now s/p catheter replacement.
2 days of no suprapubic catheter output with the urine drainage around catheter site and partially via  urethra  Worsening dislodgment of suprapubic catheter on presentation    Suprapubic catheter replaced by IR 11/23/24.    UCx growing GNRs but likely colonized. Without fevers, leukocytosis, or other evidence of sepsis. Patient asymptomatic.

## 2024-11-25 NOTE — PROGRESS NOTE ADULT - SUBJECTIVE AND OBJECTIVE BOX
[facility] Division of Hospital Medicine  Krishna Negrete DO  Available via MS Teams  In house pager [phone]    SUBJECTIVE / OVERNIGHT EVENTS:  No acute events overnight. Patient seen and examined at bedside this morning.  States he feels well. Denies acute complaints.  Reports he usually knows when he has a UTI and he does not have those symptoms right now.     ADDITIONAL REVIEW OF SYSTEMS:    MEDICATIONS  (STANDING):  apixaban 5 milliGRAM(s) Oral every 12 hours  atorvastatin 40 milliGRAM(s) Oral at bedtime  cyclobenzaprine 10 milliGRAM(s) Oral every 8 hours  DULoxetine 60 milliGRAM(s) Oral daily  famotidine    Tablet 20 milliGRAM(s) Oral daily  ferrous    sulfate 325 milliGRAM(s) Oral daily  gabapentin 100 milliGRAM(s) Oral every 8 hours  lamoTRIgine 25 milliGRAM(s) Oral daily  melatonin 6 milliGRAM(s) Oral at bedtime  senna 2 Tablet(s) Oral at bedtime    MEDICATIONS  (PRN):  acetaminophen     Tablet .. 650 milliGRAM(s) Oral every 6 hours PRN Temp greater or equal to 38C (100.4F), Mild Pain (1 - 3)  aluminum hydroxide/magnesium hydroxide/simethicone Suspension 30 milliLiter(s) Oral every 4 hours PRN Dyspepsia  ondansetron Injectable 4 milliGRAM(s) IV Push every 8 hours PRN Nausea and/or Vomiting  oxyCODONE    IR 10 milliGRAM(s) Oral every 6 hours PRN for severe pain      I&O's Summary    24 Nov 2024 07:01 - 25 Nov 2024 07:00  --------------------------------------------------------  IN: 0 mL / OUT: 1760 mL / NET: -1760 mL    25 Nov 2024 07:01  -  25 Nov 2024 13:10  --------------------------------------------------------  IN: 0 mL / OUT: 400 mL / NET: -400 mL        PHYSICAL EXAM:  Vital Signs Last 24 Hrs  T(C): 36.3 (25 Nov 2024 10:35), Max: 37.1 (25 Nov 2024 05:07)  T(F): 97.4 (25 Nov 2024 10:35), Max: 98.8 (25 Nov 2024 05:07)  HR: 80 (25 Nov 2024 10:35) (71 - 91)  BP: 123/64 (25 Nov 2024 10:35) (100/70 - 142/78)  BP(mean): --  RR: 18 (25 Nov 2024 10:35) (17 - 18)  SpO2: 100% (25 Nov 2024 10:35) (95% - 100%)    Parameters below as of 25 Nov 2024 05:07  Patient On (Oxygen Delivery Method): room air      CONSTITUTIONAL: NAD, well-groomed  EYES: PERRLA; conjunctiva and sclera clear  ENMT: Moist oral mucosa, no pharyngeal injection or exudates  NECK: Supple, no palpable masses; no thyromegaly  RESPIRATORY: Normal respiratory effort; lungs are clear to auscultation bilaterally  CARDIOVASCULAR: Regular rate and rhythm, normal S1 and S2, no murmur/rub/gallop; No lower extremity edema; Peripheral pulses are 2+ bilaterally  ABDOMEN: Nontender to palpation, normoactive bowel sounds, no rebound/guarding; No hepatosplenomegaly  MUSCULOSKELETAL: no clubbing or cyanosis of digits; no joint swelling or tenderness to palpation; 0/5 strength b/l LEs  PSYCH: A+O to person, place, and time; affect appropriate  NEUROLOGY: CN 2-12 are intact and symmetric; loss of bilateral LE sensation  SKIN: sacral wounds  : suprapubic catheter without surrounding warmth, erythema, or tenderness      LABS:                    Culture - Acid Fast - Urine (collected 23 Nov 2024 17:34)  Source: .Urine    Culture - Fungal, Other (collected 23 Nov 2024 15:00)  Source: .Urine  Preliminary Report (24 Nov 2024 13:25):    Testing in progress    Culture - Urine (collected 23 Nov 2024 15:00)  Source: Clean Catch  Preliminary Report (24 Nov 2024 15:04):    >100,000 CFU/ml Gram Negative Rods

## 2024-12-12 ENCOUNTER — INPATIENT (INPATIENT)
Facility: HOSPITAL | Age: 34
LOS: 3 days | Discharge: NURSING, REHAB OR LONG-TERM CARE | End: 2024-12-16
Attending: INTERNAL MEDICINE | Admitting: INTERNAL MEDICINE
Payer: MEDICAID

## 2024-12-12 VITALS
RESPIRATION RATE: 18 BRPM | TEMPERATURE: 98 F | OXYGEN SATURATION: 97 % | DIASTOLIC BLOOD PRESSURE: 66 MMHG | HEIGHT: 74 IN | SYSTOLIC BLOOD PRESSURE: 101 MMHG | HEART RATE: 98 BPM | WEIGHT: 274.03 LBS

## 2024-12-12 DIAGNOSIS — Z95.828 PRESENCE OF OTHER VASCULAR IMPLANTS AND GRAFTS: Chronic | ICD-10-CM

## 2024-12-12 DIAGNOSIS — T83.010A BREAKDOWN (MECHANICAL) OF CYSTOSTOMY CATHETER, INITIAL ENCOUNTER: ICD-10-CM

## 2024-12-12 DIAGNOSIS — Z93.3 COLOSTOMY STATUS: Chronic | ICD-10-CM

## 2024-12-12 DIAGNOSIS — G89.4 CHRONIC PAIN SYNDROME: ICD-10-CM

## 2024-12-12 DIAGNOSIS — Z86.718 PERSONAL HISTORY OF OTHER VENOUS THROMBOSIS AND EMBOLISM: ICD-10-CM

## 2024-12-12 DIAGNOSIS — G82.20 PARAPLEGIA, UNSPECIFIED: ICD-10-CM

## 2024-12-12 DIAGNOSIS — E78.5 HYPERLIPIDEMIA, UNSPECIFIED: ICD-10-CM

## 2024-12-12 LAB
ALBUMIN SERPL ELPH-MCNC: 3.6 G/DL — SIGNIFICANT CHANGE UP (ref 3.3–5)
ALP SERPL-CCNC: 96 U/L — SIGNIFICANT CHANGE UP (ref 40–120)
ALT FLD-CCNC: 57 U/L — SIGNIFICANT CHANGE UP (ref 12–78)
ANION GAP SERPL CALC-SCNC: 8 MMOL/L — SIGNIFICANT CHANGE UP (ref 5–17)
AST SERPL-CCNC: 46 U/L — HIGH (ref 15–37)
BASOPHILS # BLD AUTO: 0.03 K/UL — SIGNIFICANT CHANGE UP (ref 0–0.2)
BASOPHILS NFR BLD AUTO: 0.3 % — SIGNIFICANT CHANGE UP (ref 0–2)
BILIRUB SERPL-MCNC: 0.6 MG/DL — SIGNIFICANT CHANGE UP (ref 0.2–1.2)
BUN SERPL-MCNC: 11 MG/DL — SIGNIFICANT CHANGE UP (ref 7–23)
CALCIUM SERPL-MCNC: 9.4 MG/DL — SIGNIFICANT CHANGE UP (ref 8.5–10.1)
CHLORIDE SERPL-SCNC: 100 MMOL/L — SIGNIFICANT CHANGE UP (ref 96–108)
CO2 SERPL-SCNC: 28 MMOL/L — SIGNIFICANT CHANGE UP (ref 22–31)
CREAT SERPL-MCNC: 0.94 MG/DL — SIGNIFICANT CHANGE UP (ref 0.5–1.3)
EGFR: 110 ML/MIN/1.73M2 — SIGNIFICANT CHANGE UP
EOSINOPHIL # BLD AUTO: 0.23 K/UL — SIGNIFICANT CHANGE UP (ref 0–0.5)
EOSINOPHIL NFR BLD AUTO: 2.6 % — SIGNIFICANT CHANGE UP (ref 0–6)
GLUCOSE SERPL-MCNC: 164 MG/DL — HIGH (ref 70–99)
HCT VFR BLD CALC: 37.5 % — LOW (ref 39–50)
HGB BLD-MCNC: 12.3 G/DL — LOW (ref 13–17)
IMM GRANULOCYTES NFR BLD AUTO: 0.1 % — SIGNIFICANT CHANGE UP (ref 0–0.9)
LYMPHOCYTES # BLD AUTO: 1.97 K/UL — SIGNIFICANT CHANGE UP (ref 1–3.3)
LYMPHOCYTES # BLD AUTO: 22.2 % — SIGNIFICANT CHANGE UP (ref 13–44)
MCHC RBC-ENTMCNC: 26.9 PG — LOW (ref 27–34)
MCHC RBC-ENTMCNC: 32.8 G/DL — SIGNIFICANT CHANGE UP (ref 32–36)
MCV RBC AUTO: 81.9 FL — SIGNIFICANT CHANGE UP (ref 80–100)
MONOCYTES # BLD AUTO: 0.71 K/UL — SIGNIFICANT CHANGE UP (ref 0–0.9)
MONOCYTES NFR BLD AUTO: 8 % — SIGNIFICANT CHANGE UP (ref 2–14)
NEUTROPHILS # BLD AUTO: 5.94 K/UL — SIGNIFICANT CHANGE UP (ref 1.8–7.4)
NEUTROPHILS NFR BLD AUTO: 66.8 % — SIGNIFICANT CHANGE UP (ref 43–77)
NRBC # BLD: 0 /100 WBCS — SIGNIFICANT CHANGE UP (ref 0–0)
PLATELET # BLD AUTO: 283 K/UL — SIGNIFICANT CHANGE UP (ref 150–400)
POTASSIUM SERPL-MCNC: 4.5 MMOL/L — SIGNIFICANT CHANGE UP (ref 3.5–5.3)
POTASSIUM SERPL-SCNC: 4.5 MMOL/L — SIGNIFICANT CHANGE UP (ref 3.5–5.3)
PROT SERPL-MCNC: 9.5 GM/DL — HIGH (ref 6–8.3)
RBC # BLD: 4.58 M/UL — SIGNIFICANT CHANGE UP (ref 4.2–5.8)
RBC # FLD: 14.4 % — SIGNIFICANT CHANGE UP (ref 10.3–14.5)
SODIUM SERPL-SCNC: 136 MMOL/L — SIGNIFICANT CHANGE UP (ref 135–145)
WBC # BLD: 8.89 K/UL — SIGNIFICANT CHANGE UP (ref 3.8–10.5)
WBC # FLD AUTO: 8.89 K/UL — SIGNIFICANT CHANGE UP (ref 3.8–10.5)

## 2024-12-12 PROCEDURE — 99232 SBSQ HOSP IP/OBS MODERATE 35: CPT

## 2024-12-12 PROCEDURE — 99222 1ST HOSP IP/OBS MODERATE 55: CPT

## 2024-12-12 PROCEDURE — 93010 ELECTROCARDIOGRAM REPORT: CPT

## 2024-12-12 PROCEDURE — 74176 CT ABD & PELVIS W/O CONTRAST: CPT | Mod: 26,MC

## 2024-12-12 PROCEDURE — 99285 EMERGENCY DEPT VISIT HI MDM: CPT

## 2024-12-12 RX ORDER — MIDODRINE HYDROCHLORIDE 5 MG/1
10 TABLET ORAL ONCE
Refills: 0 | Status: COMPLETED | OUTPATIENT
Start: 2024-12-12 | End: 2024-12-12

## 2024-12-12 RX ORDER — OXYCODONE HYDROCHLORIDE 30 MG/1
5 TABLET ORAL EVERY 6 HOURS
Refills: 0 | Status: DISCONTINUED | OUTPATIENT
Start: 2024-12-12 | End: 2024-12-16

## 2024-12-12 RX ORDER — LAMOTRIGINE 50 MG/1
25 TABLET, EXTENDED RELEASE ORAL DAILY
Refills: 0 | Status: DISCONTINUED | OUTPATIENT
Start: 2024-12-12 | End: 2024-12-16

## 2024-12-12 RX ORDER — CYCLOBENZAPRINE HCL 10 MG
10 TABLET ORAL ONCE
Refills: 0 | Status: COMPLETED | OUTPATIENT
Start: 2024-12-12 | End: 2024-12-12

## 2024-12-12 RX ORDER — OXYCODONE HYDROCHLORIDE 30 MG/1
10 TABLET ORAL EVERY 6 HOURS
Refills: 0 | Status: DISCONTINUED | OUTPATIENT
Start: 2024-12-12 | End: 2024-12-16

## 2024-12-12 RX ORDER — 0.9 % SODIUM CHLORIDE 0.9 %
1000 INTRAVENOUS SOLUTION INTRAVENOUS ONCE
Refills: 0 | Status: COMPLETED | OUTPATIENT
Start: 2024-12-12 | End: 2024-12-12

## 2024-12-12 RX ORDER — ACETAMINOPHEN, DIPHENHYDRAMINE HCL, PHENYLEPHRINE HCL 325; 25; 5 MG/1; MG/1; MG/1
3 TABLET ORAL AT BEDTIME
Refills: 0 | Status: DISCONTINUED | OUTPATIENT
Start: 2024-12-12 | End: 2024-12-16

## 2024-12-12 RX ORDER — FERROUS SULFATE 325(65) MG
325 TABLET ORAL DAILY
Refills: 0 | Status: DISCONTINUED | OUTPATIENT
Start: 2024-12-12 | End: 2024-12-16

## 2024-12-12 RX ORDER — DULOXETINE HCL 60 MG
60 CAPSULE,DELAYED RELEASE (ENTERIC COATED) ORAL DAILY
Refills: 0 | Status: DISCONTINUED | OUTPATIENT
Start: 2024-12-12 | End: 2024-12-16

## 2024-12-12 RX ORDER — MIDODRINE HYDROCHLORIDE 5 MG/1
10 TABLET ORAL ONCE
Refills: 0 | Status: DISCONTINUED | OUTPATIENT
Start: 2024-12-12 | End: 2024-12-12

## 2024-12-12 RX ORDER — ONDANSETRON HYDROCHLORIDE 4 MG/1
4 TABLET, FILM COATED ORAL EVERY 8 HOURS
Refills: 0 | Status: DISCONTINUED | OUTPATIENT
Start: 2024-12-12 | End: 2024-12-16

## 2024-12-12 RX ORDER — INFLUENZA VIRUS VACCINE 15; 15; 15; 15 UG/.5ML; UG/.5ML; UG/.5ML; UG/.5ML
0.5 SUSPENSION INTRAMUSCULAR ONCE
Refills: 0 | Status: DISCONTINUED | OUTPATIENT
Start: 2024-12-12 | End: 2024-12-16

## 2024-12-12 RX ORDER — CYCLOBENZAPRINE HCL 10 MG
10 TABLET ORAL THREE TIMES A DAY
Refills: 0 | Status: DISCONTINUED | OUTPATIENT
Start: 2024-12-12 | End: 2024-12-16

## 2024-12-12 RX ORDER — GABAPENTIN 300 MG/1
100 CAPSULE ORAL EVERY 8 HOURS
Refills: 0 | Status: DISCONTINUED | OUTPATIENT
Start: 2024-12-12 | End: 2024-12-16

## 2024-12-12 RX ORDER — OXYCODONE HYDROCHLORIDE 30 MG/1
10 TABLET ORAL ONCE
Refills: 0 | Status: DISCONTINUED | OUTPATIENT
Start: 2024-12-12 | End: 2024-12-12

## 2024-12-12 RX ORDER — SENNOSIDES 8.6 MG
2 TABLET ORAL AT BEDTIME
Refills: 0 | Status: DISCONTINUED | OUTPATIENT
Start: 2024-12-12 | End: 2024-12-16

## 2024-12-12 RX ORDER — APIXABAN 2.5 MG/1
5 TABLET, FILM COATED ORAL EVERY 12 HOURS
Refills: 0 | Status: DISCONTINUED | OUTPATIENT
Start: 2024-12-12 | End: 2024-12-12

## 2024-12-12 RX ORDER — ACETAMINOPHEN 500MG 500 MG/1
650 TABLET, COATED ORAL EVERY 6 HOURS
Refills: 0 | Status: DISCONTINUED | OUTPATIENT
Start: 2024-12-12 | End: 2024-12-16

## 2024-12-12 RX ADMIN — OXYCODONE HYDROCHLORIDE 10 MILLIGRAM(S): 30 TABLET ORAL at 19:09

## 2024-12-12 RX ADMIN — LAMOTRIGINE 25 MILLIGRAM(S): 50 TABLET, EXTENDED RELEASE ORAL at 13:51

## 2024-12-12 RX ADMIN — Medication 10 MILLIGRAM(S): at 12:20

## 2024-12-12 RX ADMIN — MIDODRINE HYDROCHLORIDE 10 MILLIGRAM(S): 5 TABLET ORAL at 21:14

## 2024-12-12 RX ADMIN — GABAPENTIN 100 MILLIGRAM(S): 300 CAPSULE ORAL at 21:15

## 2024-12-12 RX ADMIN — Medication 325 MILLIGRAM(S): at 13:50

## 2024-12-12 RX ADMIN — GABAPENTIN 100 MILLIGRAM(S): 300 CAPSULE ORAL at 13:50

## 2024-12-12 RX ADMIN — OXYCODONE HYDROCHLORIDE 10 MILLIGRAM(S): 30 TABLET ORAL at 12:20

## 2024-12-12 RX ADMIN — Medication 2 TABLET(S): at 21:18

## 2024-12-12 RX ADMIN — Medication 40 MILLIGRAM(S): at 21:15

## 2024-12-12 RX ADMIN — Medication 60 MILLIGRAM(S): at 13:49

## 2024-12-12 NOTE — H&P ADULT - PROBLEM SELECTOR PLAN 1
present with SPC dysfunction  Afebrile, no leukocytosis  CT abd/pelvis with suprapubic catheter in place with pigtain in bladder lumen.   Urology consulted, recommended IR exchange  IR consulted present with SPC dysfunction  Afebrile, no leukocytosis  CT abd/pelvis with suprapubic catheter in place with pigtain in bladder lumen.   Urology consulted, recommended IR exchange  IR consulted  Hold apixaban for possible IR procedure tomorrow. Restart after procedure

## 2024-12-12 NOTE — ED ADULT NURSE NOTE - CHIEF COMPLAINT QUOTE
bill from [facility] for suprapubic cath clog.  Pt c/o abd pain and lower back pain.  denies any n/v/d.  hx of paraplegia, pressure ulcer of both hips and sacral region. nkda
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
## 2024-12-12 NOTE — ED PROVIDER NOTE - SKIN, MLM
Skin normal color for race, warm, dry and intact. No evidence of rash, chronic ulcers (as noted in the mdm)

## 2024-12-12 NOTE — ED ADULT NURSE NOTE - NSFALLRISKINTERV_ED_ALL_ED

## 2024-12-12 NOTE — ED ADULT NURSE NOTE - OBJECTIVE STATEMENT
Pt MENDEZ from [facility] for suprapubic catheter clog. Pt with 14Fr suprapubic cath, colostomy bag in place. Pt states he was cleaning up in bed 2 days ago when he rolled over and partially dislodged his suprapubic cath. States he thought it was still in place but had scant UOP yesterday and none today. C/o pain to groin, lower abd, low back. Denies fever. Hx paraplegia, pressure injury to sacrum and BL hips

## 2024-12-12 NOTE — ED ADULT NURSE REASSESSMENT NOTE - GENERAL PATIENT STATE
Lap top out, pending bed voiding via tube/comfortable appearance
leaky suprapubic tube noted, seen by Dr Moe/comfortable appearance
comfortable appearance

## 2024-12-12 NOTE — ED PROVIDER NOTE - NSICDXPASTMEDICALHX_GEN_ALL_CORE_FT
History  Chief Complaint   Patient presents with    Dental Pain     Pt reports she broke her tooth on the right upper jaw  Pt states "I think it's infected"  Pt reports tylenol and ibuprofen not help  Last dose of ibuprofen was about 6 hr ago  Jovani Gibbs ([id number]) is a 30 y o   female Adult patient, presenting to the Emergency Department, accompanied by , who presents with a chief complaint of Patient presents with: Dental Pain: Pt reports she broke her tooth on the right upper jaw  Pt states "I think it's infected"  Pt reports tylenol and ibuprofen not help  Last dose of ibuprofen was about 6 hr ago  Broken tooth  -Right upper tooth broke yesterday, after eating   -Has used motrin and tylenol, both without relief  -Has also had a headache on the right side of her head    Allergies: Tramadol, Aspirin, and Naproxen  Hx: Kidney stone recently with hospitalization, asthma  Regular medications: Zantac      The patient states she was in her normal state of health up until yesterday, when she fractured her tooth in the right upper portion of her mouth, and states that she has had notable pain, as well as tenderness to her jaw since that time  The patient states she has not sought dental evaluation because she has been just too busy and is not able to seek dental evaluation of next several days, because she has several things scheduled, and her  has a job interview  Patient stated that she sought treatment in the emergency department, as she had a prior dental fracture, and was treated with oral narcotic agents  The patient states that she has a notable allergy to aspirin, nonsteroidal anti-inflammatories, as well as tramadol  Prior to Admission Medications   Prescriptions Last Dose Informant Patient Reported? Taking?    HYDROcodone-acetaminophen (NORCO) 5-325 mg per tablet  Self No No   Sig: Take 1 tablet by mouth every 6 (six) hours as needed for pain (For severe pain) for up to 10 days Max Daily Amount: 4 tablets   Patient not taking: Reported on 8/31/2018    cephalexin (KEFLEX) 500 mg capsule  Self No No   Sig: Take 1 capsule (500 mg total) by mouth 4 (four) times a day for 7 days   ciprofloxacin (CIPRO) 500 mg tablet  Self No No   Sig: Take 1 tablet (500 mg total) by mouth 2 (two) times a day for 5 days   metoclopramide (REGLAN) 10 mg tablet  Self No No   Sig: Take 1 tablet (10 mg total) by mouth 4 (four) times a day for 7 days As needed for nausea   ondansetron (ZOFRAN-ODT) 4 mg disintegrating tablet  Self No No   Sig: Take 1 tablet (4 mg total) by mouth every 6 (six) hours as needed for nausea or vomiting   Patient not taking: Reported on 8/31/2018    oxyCODONE-acetaminophen (PERCOCET) 5-325 mg per tablet  Self No No   Sig: Take 1 tablet by mouth every 4 (four) hours as needed for moderate pain for up to 4 days Max Daily Amount: 6 tablets   Patient not taking: Reported on 8/31/2018    oxybutynin (DITROPAN) 5 mg tablet  Self No No   Sig: Take 1 tablet (5 mg total) by mouth 2 (two) times a day as needed (flank pain)      Facility-Administered Medications: None       Past Medical History:   Diagnosis Date    Asthma     GERD (gastroesophageal reflux disease)     Hernia, abdominal     Migraines        Past Surgical History:   Procedure Laterality Date    FL RETROGRADE PYELOGRAM  8/28/2018    WI CYSTO/URETERO W/LITHOTRIPSY &INDWELL STENT INSRT Right 8/28/2018    Procedure: CYSTOSCOPY URETEROSCOPY WITH RETROGRADE PYELOGRAM AND INSERTION STENT URETERAL;  Surgeon: Jackson Callaway MD;  Location[facility];  Service: Urology       Family History   Problem Relation Age of Onset    Diabetes Mother     Hyperlipidemia Mother     Stroke Mother     Heart disease Mother     Nephrolithiasis Father     Nephrolithiasis Brother      I have reviewed and agree with the history as documented      Social History   Substance Use Topics    Smoking status: Current Every Day Smoker     Packs/day: 0 50     Types: Cigarettes    Smokeless tobacco: Never Used    Alcohol use No        Review of Systems  Review of Systems: The Patient/Parent Denies the following: Negative, Except as noted in HPI  Physical Exam  Physical Exam  General: 30 y o  female patient, who appears their stated age, in moderate distress and Due to pain  Skin: No rashes, masses, or lesions noted  HEENT: Atraumatic & Normocephalic  External ears normal, with no noted abnormalities or deformities  Bilateral canals examined, without noted edema or discomfort  No pain while pulling the tragus  TM well visualized bilaterally, with no noted obstruction, effusion, erythema, or air fluid levels  No noted enlargement of the mastoid processes bilaterally  EOMI, PERRL, Conjunctiva without injection bilaterally  No conjunctival drainage noted bilaterally  Nares patent bilaterally, with no noted obstructions, erythema, or drainage  No noted rhinorrhea  Pharynx well visualized, with no exudate noted in the posterior pharynx  Tonsils are not enlarged  Gingival surfaces are within normal limits  The patient does have multiple dental caries present throughout her dentition, and does have an haroon 2 dental fracture on her right upper 2nd molar, that does appear acute in nature  Palpation of the overlying gingival surfaces reveals notable tenderness, but no notable fluctuance  Neck: Soft, supple, and non-tender  No enlargement of the anterior cervical, posterior cervical, or occipital lymph notes  Cardiac: Regular rate and rhythm, with no noted murmurs, rubs, or gallops  Pulmonary: Normal Appearance  Clear to auscultation, with no noted rales, rhonchi, or wheezes  Abdomen: Normal appearance  Dull to palpation, except over the gastric bubble, which was mildly tympanic  Bowel sounds were within normal limits, with no noted high pitch sounds heard  Negative Abbott sign   No pain with palpation at [place]   MSK: Joint ROM grossly normal, actively and passively, to all extremities  No noted joint swelling  Normal Gait  Neuro: Cranial nerves 2-12 grossly intact  Normal sensation grossly  Psych: Normal affect and responsiveness  Vital Signs  ED Triage Vitals   Temperature Pulse Respirations Blood Pressure SpO2   08/31/18 1835 08/31/18 1833 08/31/18 1833 08/31/18 1833 08/31/18 1833   97 9 °F (36 6 °C) 90 18 115/55 98 %      Temp Source Heart Rate Source Patient Position - Orthostatic VS BP Location FiO2 (%)   08/31/18 1835 08/31/18 1833 08/31/18 1833 08/31/18 1833 --   Oral Monitor Sitting Left arm       Pain Score       --                  Vitals:    08/31/18 1833   BP: 115/55   Pulse: 90   Patient Position - Orthostatic VS: Sitting       Visual Acuity      ED Medications  Medications   HYDROcodone-acetaminophen (NORCO) 5-325 mg per tablet 1 tablet (1 tablet Oral Given 8/31/18 2115)   diphenhydrAMINE (BENADRYL) tablet 25 mg (25 mg Oral Given 8/31/18 2115)       Diagnostic Studies  Results Reviewed     None                 No orders to display              Procedures  Procedures       Phone Contacts  ED Phone Contact    ED Course                               MDM  Number of Diagnoses or Management Options  Broken tooth: new and requires workup  Closed fracture of tooth, initial encounter: new and requires workup  Diagnosis management comments: Most likely diagnosis is Wendy Joan 2 fracture of the right upper 2nd molar  Upon conversation with the patient it was clear that her primary goal in seeking evaluation in the emergency department was pain medication, as she was unable to seek dental evaluation or treatment for the next several days    It was explained to the patient that, for dental concerns, symptomatic treatment could be offered with a potential dental block, but that no narcotic pain medications will be provided, as we do not have the means, here in the emergency department, for definitive treatment for dental abnormalities  The patient states that she is allergic to all nonsteroidal anti-inflammatory medications, and that she poorly tolerates injections  As such, the patient was instructed to utilize Tylenol for pain, and she was provided with a prescription for Augmentin for potential dental abscess  The patient did appear to be poorly satisfied with this decision, but it was instructed that the safe guards were in place to prevent potential complications from utilization of these pain medications  The patient was instructed to return to the emergency department, or seek emergent evaluation should she develop neurologic symptoms, tenderness to palpation, difficulty swallowing, or focal neurologic defects associated with story  In addition, the patient was instructed to follow up with dental, for which she was provided contact information, as the emergency department lacks the needs inability to definitively treat acute dental injuries  Amount and/or Complexity of Data Reviewed  Decide to obtain previous medical records or to obtain history from someone other than the patient: yes  Obtain history from someone other than the patient: yes  Review and summarize past medical records: yes  Discuss the patient with other providers: yes  Independent visualization of images, tracings, or specimens: yes    Risk of Complications, Morbidity, and/or Mortality  Presenting problems: moderate  Diagnostic procedures: moderate  Management options: moderate    Patient Progress  Patient progress: stable    CritCare Time    Disposition  Final diagnoses:   Closed fracture of tooth, initial encounter   Broken tooth     Time reflects when diagnosis was documented in both MDM as applicable and the Disposition within this note     Time User Action Codes Description Comment    8/31/2018  9:03 PM Damien Ryan [S02  5XXA] Closed fracture of tooth, initial encounter     8/31/2018  9:08 PM Gabriela Henderson Add [S02 5XXA] Broken tooth       ED Disposition     ED Disposition Condition Comment    Discharge  Mike Pérez discharge to home/self care      Condition at discharge: Good        Follow-up Information     Follow up With Specialties Details Why Contact Info    [facility] [address] [place] [address] Road  In 3 days If symptoms worsen, As needed [address]  [phone]          Discharge Medication List as of 8/31/2018  9:11 PM      START taking these medications    Details   amoxicillin-clavulanate (AUGMENTIN) 400-57 mg/5 mL suspension Take 10 9 mL (875 mg total) by mouth 2 (two) times a day for 7 days, Starting Fri 8/31/2018, Until Fri 9/7/2018, Print         CONTINUE these medications which have NOT CHANGED    Details   ciprofloxacin (CIPRO) 500 mg tablet Take 1 tablet (500 mg total) by mouth 2 (two) times a day for 5 days, Starting Mon 8/27/2018, Until Sat 9/1/2018, Normal      HYDROcodone-acetaminophen (NORCO) 5-325 mg per tablet Take 1 tablet by mouth every 6 (six) hours as needed for pain (For severe pain) for up to 10 days Max Daily Amount: 4 tablets, Starting Tue 8/28/2018, Until Fri 9/7/2018, Print      ondansetron (ZOFRAN-ODT) 4 mg disintegrating tablet Take 1 tablet (4 mg total) by mouth every 6 (six) hours as needed for nausea or vomiting, Starting Fri 8/24/2018, Normal      oxybutynin (DITROPAN) 5 mg tablet Take 1 tablet (5 mg total) by mouth 2 (two) times a day as needed (flank pain), Starting Tue 8/28/2018, Normal      oxyCODONE-acetaminophen (PERCOCET) 5-325 mg per tablet Take 1 tablet by mouth every 4 (four) hours as needed for moderate pain for up to 4 days Max Daily Amount: 6 tablets, Starting Tue 8/28/2018, Until Sat 9/1/2018, Print         STOP taking these medications       cephalexin (KEFLEX) 500 mg capsule Comments:   Reason for Stopping:         metoclopramide (REGLAN) 10 mg tablet Comments:   Reason for Stopping:             No discharge procedures on file      ED Provider  Electronically Signed by           Jerry Cabral PA-C  09/01/18 6874 PAST MEDICAL HISTORY:  Colostomy in place     Paraplegia     VTE (venous thromboembolism)
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
## 2024-12-12 NOTE — H&P ADULT - PROBLEM SELECTOR PLAN 5
on apixaban 5mg bid on apixaban 5mg bid at home, hold for now for possible IR procedure tomorrow. Restart after IR SPC exchange
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
## 2024-12-12 NOTE — PATIENT PROFILE ADULT - NSPROPTRIGHTBILLOFRIGHTS_GEN_A_NUR
Pt cancelled all future appts with Dr Han Rivera on [employer], saying due to work schedule. patient

## 2024-12-12 NOTE — PATIENT PROFILE ADULT - FALL HARM RISK - HARM RISK INTERVENTIONS

## 2024-12-12 NOTE — ED PROVIDER NOTE - CARE PLAN
Principal Discharge DX:	Suprapubic catheter dysfunction  Secondary Diagnosis:	Paraplegia, unspecified   1

## 2024-12-12 NOTE — PATIENT PROFILE ADULT - PATIENT'S PREFERRED PRONOUN
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  No aggravating or alleviating factors otherwise noted.  No known trauma.  Patient unable to give history, no other acute complaints at this timeBipolar mood disorder     Breast cancer, left treated with surgery and RT, no lymph node dissection    Esophageal reflux     Hip pain, acute, right     Hypercalcemia.     PAST SURGICAL HISTORY:  S/P cataract extraction, unspecified laterality bilateral - 2016    S/P lumpectomy, left breast. < from: CT Orbit w/ IV Cont (12.20.22 @ 17:38) >    ACC: [id number] EXAM:  CT ORBITS IC                          PROCEDURE DATE:  12/20/2022          INTERPRETATION:  CLINICAL STATEMENT: Left facial infection. Rule out   orbital cellulitis    TECHNIQUE: CT of the orbits was performed with IV contrast.Coronal   reformat was obtained. 90 cc of Omnipaque 350 administered    COMPARISON: None.    FINDINGS:  There is soft tissue stranding of the fat in the left preseptal space.   The globes are intact. No inflammatory stranding in the left retrobulbar   fat/intraconal space.    There is no loculated fluid collection.    Small retention cyst/polyp right maxillary sinus    IMPRESSION:  Left preseptal cellulitis without evidence of orbital cellulitis    --- End of Report ---            MEGHAN RODRIGUEZ MD; Attending Radiologist  This document has been electronically signed. Dec 20 2022  7:20PM    < end of copied text >  < from: Xray Fluoro up to 1 Hr in OR (03.07.18 @ 12:49) >    EXAM:  FLOURO IN O.R. 1HR [id number]                                  PROCEDURE DATE:  03/07/2018          INTERPRETATION:  Imaging guidance was provided by the Department of   Radiology for a procedure performed by a physician from another clinical   department. This study was performed and will be interpreted by that   physician and therefore the department of radiology will not render an   interpretation of these images.    This report was generated by an  in   the department of radiology.                  DEPARTMENT RADIOLOGY   This document has been electronically signed. Mar  8 2018 10:09AM                < end of copied text >  < from: Xray Fluoro up to 1 Hr in OR (03.07.18 @ 12:49) >    EXAM:  FLOURO IN O.R. 1HR [id number]                                  PROCEDURE DATE:  03/07/2018          INTERPRETATION:  Imaging guidance was provided by the Department of   Radiology for a procedure performed by a physician from another clinical   department. This study was performed and will be interpreted by that   physician and therefore the department of radiology will not render an   interpretation of these images.    This report was generated by an  in   the department of radiology.                  DEPARTMENT RADIOLOGY   This document has been electronically signed. Mar  8 2018 10:09AM                < end of copied text >  < from: Xray Chest 1 View AP/PA (11.14.14 @ 17:19) >     EXAM:  CHEST 1 VIEW           PROCEDURE DATE:  11/14/2014      INTERPRETATION:  Clinical information: Hypercalcemia    No prior studies present for comparison  Portable study, 5:09 PM  Clear lungs. No sign of infiltrate effusion or congestive failure. Heart   size within normal limits. Aortic knob contains calcifications. Prominent   right paratracheal shadow likely related to tortuous brachiocephalic   vessels. Focal calcification present right humeral neck.  Surgical clips visible along lateral aspect of left chest wall.    IMPRESSION: No active disease.              PILI TESFAYE M.D.,ATTENDING RADIOLOGIST  This examination was interpreted on: Nov 14 2014  5:28P.  This document   has been electronically signed. Nov 14 2014  5:30P.          < end of copied text >   (20 Dec 2022 21:00)      REVIEW OF SYSTEMS:    CONSTITUTIONAL: No fever, weight loss, or fatigue  EYES: No eye pain, visual disturbances, or discharge  ENMT:  No difficulty hearing, tinnitus, vertigo; No sinus or throat pain  NECK: No pain or stiffness  BREASTS: No pain, masses, or nipple discharge  RESPIRATORY: No cough, wheezing, chills or hemoptysis; No shortness of breath  CARDIOVASCULAR: No chest pain, palpitations, dizziness, or leg swelling  GASTROINTESTINAL: No abdominal or epigastric pain. No nausea, vomiting, or hematemesis; No diarrhea or constipation. No melena or hematochezia.  GENITOURINARY: No dysuria, frequency, hematuria, or incontinence  NEUROLOGICAL: No headaches, memory loss, loss of strength, numbness, or tremors  SKIN: No itching, burning, rashes, or lesions   LYMPH NODES: No enlarged glands  ENDOCRINE: No heat or cold intolerance; No hair loss  MUSCULOSKELETAL: No joint pain or swelling; No muscle, back, or extremity pain  PSYCHIATRIC: No depression, anxiety, mood swings, or difficulty sleeping  HEME/LYMPH: No easy bruising, or bleeding gums  ALLERGY AND IMMUNOLOGIC: No hives or eczema    MEDICATIONS  (STANDING):  artificial tears (preservative free) Ophthalmic Solution 1 Drop(s) Both EYES three times a day  cinacalcet 60 milliGRAM(s) Oral daily  dextrose 5% + sodium chloride 0.45%. 1000 milliLiter(s) (50 mL/Hr) IV Continuous <Continuous>  dextrose 5%. 1000 milliLiter(s) (100 mL/Hr) IV Continuous <Continuous>  dextrose 5%. 1000 milliLiter(s) (50 mL/Hr) IV Continuous <Continuous>  dextrose 50% Injectable 25 Gram(s) IV Push once  dextrose 50% Injectable 12.5 Gram(s) IV Push once  dextrose 50% Injectable 25 Gram(s) IV Push once  erythromycin   Ointment 1 Application(s) Left EYE every 4 hours  ferrous    sulfate Liquid 300 milliGRAM(s) Enteral Tube daily  glucagon  Injectable 1 milliGRAM(s) IntraMuscular once  heparin   Injectable 5000 Unit(s) SubCutaneous every 12 hours  insulin lispro (ADMELOG) corrective regimen sliding scale   SubCutaneous every 6 hours  lactobacillus acidophilus 1 Tablet(s) Oral daily  lithium 300 milliGRAM(s) Oral daily  pantoprazole    Tablet 40 milliGRAM(s) Oral before breakfast  piperacillin/tazobactam IVPB.. 3.375 Gram(s) IV Intermittent every 8 hours  senna 2 Tablet(s) Oral at bedtime    MEDICATIONS  (PRN):  acetaminophen     Tablet .. 650 milliGRAM(s) Oral every 6 hours PRN Temp greater or equal to 38C (100.4F), Mild Pain (1 - 3)  aluminum hydroxide/magnesium hydroxide/simethicone Suspension 30 milliLiter(s) Oral every 4 hours PRN Dyspepsia  bisacodyl Suppository 10 milliGRAM(s) Rectal daily PRN Constipation  dextrose Oral Gel 15 Gram(s) Oral once PRN Blood Glucose LESS THAN 70 milliGRAM(s)/deciliter  magnesium hydroxide Suspension 30 milliLiter(s) Oral daily PRN Constipation  melatonin 3 milliGRAM(s) Oral at bedtime PRN Insomnia  ondansetron Injectable 4 milliGRAM(s) IV Push every 8 hours PRN Nausea and/or Vomiting  polyethylene glycol 3350 17 Gram(s) Oral daily PRN Constipation  traMADol 50 milliGRAM(s) Oral four times a day PRN Moderate Pain (4 - 6)      ALLERGIES: No Known Allergies      Social History:     FAMILY HISTORY:  No pertinent family history in first degree relatives        PAST MEDICAL & SURGICAL HISTORY:  Bipolar mood disorder      Esophageal reflux      Hypercalcemia      Hip pain, acute, right      Breast cancer, left  treated with surgery and RT, no lymph node dissection      S/P cataract extraction, unspecified laterality  bilateral - 2016      S/P lumpectomy, left breast            PHYSICAL EXAMINATION:  -----------------------------  T(C): 36.7 (12-21-22 @ 04:32), Max: 37.1 (12-20-22 @ 15:28)  HR: 71 (12-21-22 @ 04:32) (71 - 73)  BP: 128/66 (12-21-22 @ 04:32) (126/62 - 128/66)  RR: 17 (12-21-22 @ 04:32) (17 - 18)  SpO2: 98% (12-21-22 @ 04:32) (97% - 98%)  Wt(kg): --        Constitutional: well developed, normal appearance, well groomed, well nourished, no deformities and no acute distress.   Eyes: the conjunctiva exhibited no abnormalities and the eyelids demonstrated no xanthelasmas.   HEENT: normal oral mucosa, no oral pallor and no oral cyanosis.   Neck: normal jugular venous A waves present, normal jugular venous V waves present and no jugular venous cullen A waves.   Pulmonary: no respiratory distress, normal respiratory rhythm and effort, no accessory muscle use and lungs were clear to auscultation bilaterally.   Cardiovascular: heart rate and rhythm were normal, normal S1 and S2 and no murmur, gallop, rub, heave or thrill are present.   Abdomen: soft, non-tender, no hepato-splenomegaly and no abdominal mass palpated.   Musculoskeletal: the gait could not be assessed..   Extremities: no clubbing of the fingernails, no localized cyanosis, no petechial hemorrhages and no ischemic changes.   Skin: normal skin color and pigmentation, no rash, no venous stasis, no skin lesions, no skin ulcer and no xanthoma was observed.   Psychiatric: oriented to person, place, and time, the affect was normal, the mood was normal and not feeling anxious.     LABS:   --------  12-21    137  |  105  |  18  ----------------------------<  120<H>  4.2   |  26  |  0.71    Ca    9.9      21 Dec 2022 07:35  Mg     2.2     12-21    TPro  7.6  /  Alb  2.4<L>  /  TBili  0.2  /  DBili  x   /  AST  28  /  ALT  29  /  AlkPhos  71  12-20                         9.7    7.21  )-----------( 410      ( 21 Dec 2022 07:35 )             31.3     PT/INR - ( 21 Dec 2022 07:35 )   PT: 13.4 sec;   INR: 1.14 ratio                     RADIOLOGY:  -----------------        ECG:     ECHO
[facility]  INFECTIOUS DISEASES - [address], [place] [address]  Tel: [phone]     Fax: [phone]  =======================================================    N-[id number]  VERA JAMISON     CC: Patient is a 83y old  Female who presents with a chief complaint of left facial swelling    HPI:  83-year-old female with history of diabetes, anemia, bipolar disorder, delirium/psychosis, edema, from [facility], who presented with left facial redness and swelling. Patient not fully cooperative, says she just wants to rest. She denies any facial/eye pain or headache. Denies any fevers.        PAST MEDICAL & SURGICAL HISTORY:  Bipolar mood disorder      Esophageal reflux      Hypercalcemia      Hip pain, acute, right      Breast cancer, left  treated with surgery and RT, no lymph node dissection      S/P cataract extraction, unspecified laterality  bilateral - 2016      S/P lumpectomy, left breast        Social Hx:     FAMILY HISTORY:  No pertinent family history in first degree relatives        Allergies    No Known Allergies    Intolerances        Antibiotics:  MEDICATIONS  (STANDING):  artificial tears (preservative free) Ophthalmic Solution 1 Drop(s) Both EYES three times a day  cinacalcet 60 milliGRAM(s) Oral daily  dextrose 5% + sodium chloride 0.45%. 1000 milliLiter(s) (50 mL/Hr) IV Continuous <Continuous>  dextrose 5%. 1000 milliLiter(s) (100 mL/Hr) IV Continuous <Continuous>  dextrose 5%. 1000 milliLiter(s) (50 mL/Hr) IV Continuous <Continuous>  dextrose 50% Injectable 25 Gram(s) IV Push once  dextrose 50% Injectable 12.5 Gram(s) IV Push once  dextrose 50% Injectable 25 Gram(s) IV Push once  erythromycin   Ointment 1 Application(s) Left EYE every 4 hours  ferrous    sulfate Liquid 300 milliGRAM(s) Enteral Tube daily  glucagon  Injectable 1 milliGRAM(s) IntraMuscular once  heparin   Injectable 5000 Unit(s) SubCutaneous every 12 hours  insulin lispro (ADMELOG) corrective regimen sliding scale   SubCutaneous every 6 hours  lactobacillus acidophilus 1 Tablet(s) Oral daily  lithium 300 milliGRAM(s) Oral daily  pantoprazole    Tablet 40 milliGRAM(s) Oral before breakfast  piperacillin/tazobactam IVPB.. 3.375 Gram(s) IV Intermittent every 8 hours  senna 2 Tablet(s) Oral at bedtime    MEDICATIONS  (PRN):  acetaminophen     Tablet .. 650 milliGRAM(s) Oral every 6 hours PRN Temp greater or equal to 38C (100.4F), Mild Pain (1 - 3)  aluminum hydroxide/magnesium hydroxide/simethicone Suspension 30 milliLiter(s) Oral every 4 hours PRN Dyspepsia  bisacodyl Suppository 10 milliGRAM(s) Rectal daily PRN Constipation  dextrose Oral Gel 15 Gram(s) Oral once PRN Blood Glucose LESS THAN 70 milliGRAM(s)/deciliter  magnesium hydroxide Suspension 30 milliLiter(s) Oral daily PRN Constipation  melatonin 3 milliGRAM(s) Oral at bedtime PRN Insomnia  ondansetron Injectable 4 milliGRAM(s) IV Push every 8 hours PRN Nausea and/or Vomiting  polyethylene glycol 3350 17 Gram(s) Oral daily PRN Constipation  traMADol 50 milliGRAM(s) Oral four times a day PRN Moderate Pain (4 - 6)       REVIEW OF SYSTEMS: *limited 2/2 patient cooperation*  CONSTITUTIONAL:  No Fever or chills  HEENT:  see history  CARDIOVASCULAR:  No chest pain or SOB.  RESPIRATORY:  No cough, shortness of breath  GASTROINTESTINAL:  No nausea, vomiting, or abdominal pain  NEUROLOGIC:  No headache    Physical Exam:  Vital Signs Last 24 Hrs  T(C): 36.9 (21 Dec 2022 12:33), Max: 37.1 (20 Dec 2022 15:28)  T(F): 98.5 (21 Dec 2022 12:33), Max: 98.8 (20 Dec 2022 15:28)  HR: 70 (21 Dec 2022 12:33) (70 - 73)  BP: 108/59 (21 Dec 2022 12:33) (108/59 - 128/66)  BP(mean): --  RR: 16 (21 Dec 2022 12:33) (16 - 18)  SpO2: 95% (21 Dec 2022 12:33) (95% - 98%)    Parameters below as of 21 Dec 2022 12:33  Patient On (Oxygen Delivery Method): room air        GEN: NAD  HEENT: (+) swelling and erythema around L eye extending to forehead, some yellow crusting noted on the lids  NECK: Supple.    LUNGS: Clear to auscultation.  HEART: Regular rate and rhythm   ABDOMEN: Soft, nontender, and nondistended.    EXTREMITIES: No leg edema.  NEUROLOGIC: Answering some questions    Labs:      137  |  105  |  18  ----------------------------<  120<H>  4.2   |  26  |  0.71    Ca    9.9      21 Dec 2022 07:35  Mg     2.2         TPro  7.6  /  Alb  2.4<L>  /  TBili  0.2  /  DBili  x   /  AST  28  /  ALT  29  /  AlkPhos  71                            9.7    7.21  )-----------( 410      ( 21 Dec 2022 07:35 )             31.3     PT/INR - ( 21 Dec 2022 07:35 )   PT: 13.4 sec;   INR: 1.14 ratio           Urinalysis Basic - ( 21 Dec 2022 11:10 )    Color: Yellow / Appearance: Clear / S.010 / pH: x  Gluc: x / Ketone: Negative  / Bili: Negative / Urobili: Negative   Blood: x / Protein: 30 mg/dL / Nitrite: Negative   Leuk Esterase: Trace / RBC: 0-2 /HPF / WBC 3-5   Sq Epi: x / Non Sq Epi: Occasional / Bacteria: Occasional      LIVER FUNCTIONS - ( 20 Dec 2022 16:10 )  Alb: 2.4 g/dL / Pro: 7.6 g/dL / ALK PHOS: 71 U/L / ALT: 29 U/L / AST: 28 U/L / GGT: x                       SARS-CoV-2 Result: NotDetec (22 @ 16:10)      RECENT CULTURES:        All imaging and other data have been reviewed.      CT orbit with contrast:  FINDINGS:  There is soft tissue stranding of the fat in the left preseptal space.   The globes are intact. No inflammatory stranding in the left retrobulbar   fat/intraconal space.    There is no loculated fluid collection.    Small retention cyst/polyp right maxillary sinus    IMPRESSION:  Left preseptal cellulitis without evidence of orbital cellulitis  
Him/He

## 2024-12-12 NOTE — ED ADULT TRIAGE NOTE - CHIEF COMPLAINT QUOTE
bill from [facility] for suprapubic cath clog.  Pt c/o abd pain and lower back pain.  denies any n/v/d.  hx of paraplegia, pressure ulcer of both hips and sacral region. nkda

## 2024-12-12 NOTE — ED ADULT NURSE NOTE - NSFALLDEVICES_ED_ALL_ED
DAILY PROGRESS NOTE    ADMISSION DATE:  1/30/2023  DATE:  2/2/2023  CURRENT HOSPITAL DAY:  Hospital Day: 4  ATTENDING PHYSICIAN:  Alysha Glover,   CODE STATUS:  Do Not Resuscitate      SUBJECTIVE:    Marleny Owen is a 88 year old female patient admitted with Syncope, unspecified syncope type [R55]  Syncope and collapse [R55] DAUGHTER AT BEDSIDE   POLST FORM SIGNED  FULL LET      REVIEW OF SYSTEMS:   NO CP NO SOB , NOT LIGHTHEADED, DID VOMIT LAST NIGHT       EVENTS: ***    MEDICATIONS:    Current Facility-Administered Medications   Medication   • potassium CHLORIDE 20 mEq/100mL IVPB premix    Followed by   • [START ON 2/3/2023] potassium CHLORIDE 20 mEq/100mL IVPB premix   • diphenoxylate-atropine (LOMOTIL) 2.5-0.025 MG tablet 2 tablet   • calcium carbonate (TUMS) chewable tablet 1,000 mg   • acetaminophen (TYLENOL) tablet 650 mg   • levothyroxine (SYNTHROID, LEVOTHROID) tablet 137 mcg   • metoPROLOL tartrate (LOPRESSOR) tablet 100 mg   • pantoprazole (PROTONIX) EC tablet 40 mg   • pravastatin (PRAVACHOL) tablet 80 mg   • cyanocobalamin (Vitamin B-12) tablet 250 mcg   • heparin (porcine) injection 5,000 Units   • sodium chloride 0.9% infusion         OBJECTIVE:    VITAL SIGNS:     Vital Last Value 24 Hour Range   Temperature 97.9 °F (36.6 °C) (02/02/23 1943) Temp  Min: 96.3 °F (35.7 °C)  Max: 97.9 °F (36.6 °C)   Pulse 70 (02/02/23 1943) Pulse  Min: 68  Max: 88   Respiratory 18 (02/02/23 1943) Resp  Min: 16  Max: 18   Non-Invasive  Blood Pressure 112/73 (02/02/23 1943) BP  Min: 94/63  Max: 112/73   Pulse Oximetry 97 % (02/02/23 1943) SpO2  Min: 97 %  Max: 100 %     Vital Today Admitted   Weight 77 kg (169 lb 12.1 oz) (01/30/23 2100) Weight: 72.6 kg (160 lb) (01/30/23 1655)   Height N/A Height: 5' 5\" (165.1 cm) (01/30/23 2100)   Body Mass Index N/A BMI (Calculated): 28.25 (01/30/23 2100)     INTAKE/OUTPUT:      Intake/Output Summary (Last 24 hours) at 2/2/2023 7833  Last data filed at 2/2/2023 3932  Gross per 24  hour   Intake 3450.23 ml   Output 350 ml   Net 3100.23 ml         Physical Exam  Constitutional:       General: She is not in acute distress.     Appearance: She is normal weight.   Eyes:      Pupils: Pupils are equal, round, and reactive to light.   Cardiovascular:      Rate and Rhythm: Normal rate and regular rhythm.      Pulses: Normal pulses.      Heart sounds: Normal heart sounds.   Pulmonary:      Effort: Pulmonary effort is normal. No respiratory distress.      Breath sounds: Normal breath sounds. No stridor. No wheezing or rhonchi.   Abdominal:      General: Abdomen is flat. Bowel sounds are normal. There is no distension.      Palpations: Abdomen is soft. There is no mass.      Tenderness: There is no abdominal tenderness.   Musculoskeletal:      Right lower leg: Edema present.      Left lower leg: Edema present.   Skin:     General: Skin is warm and dry.   Neurological:      General: No focal deficit present.      Mental Status: She is alert.              LABORATORY DATA:    Recent Results (from the past 24 hour(s))   Comprehensive Metabolic Panel    Collection Time: 02/02/23  6:30 AM   Result Value Ref Range    Fasting Status      Sodium 137 135 - 145 mmol/L    Potassium 2.9 (L) 3.4 - 5.1 mmol/L    Chloride 110 97 - 110 mmol/L    Carbon Dioxide 14 (L) 21 - 32 mmol/L    Anion Gap 16 7 - 19 mmol/L    Glucose 103 (H) 70 - 99 mg/dL    BUN 23 (H) 6 - 20 mg/dL    Creatinine 1.38 (H) 0.51 - 0.95 mg/dL    Glomerular Filtration Rate 37 (L) >=60    BUN/ Creatinine Ratio 17 7 - 25    Calcium 7.6 (L) 8.4 - 10.2 mg/dL    Bilirubin, Total 0.5 0.2 - 1.0 mg/dL    GOT/AST 16 <=37 Units/L    GPT/ALT 7 <64 Units/L    Alkaline Phosphatase 73 45 - 117 Units/L    Albumin 2.2 (L) 3.6 - 5.1 g/dL    Protein, Total 4.8 (L) 6.4 - 8.2 g/dL    Globulin 2.6 2.0 - 4.0 g/dL    A/G Ratio 0.8 (L) 1.0 - 2.4   Magnesium    Collection Time: 02/02/23  6:30 AM   Result Value Ref Range    Magnesium 1.8 1.7 - 2.4 mg/dL   Occult Blood - gFOB  (guaiac)    Collection Time: 02/02/23  9:24 AM   Result Value Ref Range    gFOB (guaiac) - Occult Blood Positive (A) Negative        IMAGING STUDIES:    No results found for any visits on 01/30/23 (from the past 48 hour(s)).                           ASSESSMENT/PLAN:     Active Hospital Problems    Diagnosis    • Syncope and collapse    • Syncope, unspecified syncope type      No problem-specific Assessment & Plan notes found for this encounter.   HYPOKALEMIA CONT TO REPLACE    DIARRHEA  LESS INFECTIOUS ETIOL VS DRUG SIDE EFFCT AWAIT RECS BY HEME   METASTATIC BREAST CA  PAINCONTROLLD  ANEMIA  MULTIFACTO NO SIGNS OF BLEEDING  BUT POS HEMOCCULT   PRE RENAL  AZOT - IMPROVED   VOMITING YESTERDAY PO  NOW  RESOLVED     TIME SPENT: 40                Alysha Glover, DO    Please note the time of the note may not be an accurate indication of the time the patient was seen as notes sometimes get delayed due to acute patient care issues.   Wheelchair

## 2024-12-12 NOTE — ED PROVIDER NOTE - PROGRESS NOTE DETAILS
urology consulted, pt has been seen by them in the past, pt will require IR to replace his suprapubic catheter, IR not available at [facility] today transfer center called, Dr del toro on call, pending call back Pt  seen by urology PA, recommends admission for catheter replacement tmr Case discussed with Dr Hernández from urology in the ER, reviewed Ct (official read not resulted yet) which he states shows the catheter is in the bladder, recommends urology in house to evaluate pt given meds for his chronic pain and muscle spasms

## 2024-12-13 LAB
ALBUMIN SERPL ELPH-MCNC: 3.5 G/DL — SIGNIFICANT CHANGE UP (ref 3.3–5)
ALP SERPL-CCNC: 92 U/L — SIGNIFICANT CHANGE UP (ref 40–120)
ALT FLD-CCNC: 53 U/L — SIGNIFICANT CHANGE UP (ref 12–78)
ANION GAP SERPL CALC-SCNC: 8 MMOL/L — SIGNIFICANT CHANGE UP (ref 5–17)
APTT BLD: 36.1 SEC — HIGH (ref 24.5–35.6)
AST SERPL-CCNC: 39 U/L — HIGH (ref 15–37)
BILIRUB SERPL-MCNC: 0.5 MG/DL — SIGNIFICANT CHANGE UP (ref 0.2–1.2)
BUN SERPL-MCNC: 12 MG/DL — SIGNIFICANT CHANGE UP (ref 7–23)
CALCIUM SERPL-MCNC: 9 MG/DL — SIGNIFICANT CHANGE UP (ref 8.5–10.1)
CHLORIDE SERPL-SCNC: 101 MMOL/L — SIGNIFICANT CHANGE UP (ref 96–108)
CO2 SERPL-SCNC: 27 MMOL/L — SIGNIFICANT CHANGE UP (ref 22–31)
CREAT SERPL-MCNC: 0.75 MG/DL — SIGNIFICANT CHANGE UP (ref 0.5–1.3)
EGFR: 122 ML/MIN/1.73M2 — SIGNIFICANT CHANGE UP
GLUCOSE SERPL-MCNC: 117 MG/DL — HIGH (ref 70–99)
HCT VFR BLD CALC: 36.5 % — LOW (ref 39–50)
HGB BLD-MCNC: 11.7 G/DL — LOW (ref 13–17)
INR BLD: 1.07 RATIO — SIGNIFICANT CHANGE UP (ref 0.85–1.16)
MAGNESIUM SERPL-MCNC: 1.7 MG/DL — SIGNIFICANT CHANGE UP (ref 1.6–2.6)
MCHC RBC-ENTMCNC: 26.5 PG — LOW (ref 27–34)
MCHC RBC-ENTMCNC: 32.1 G/DL — SIGNIFICANT CHANGE UP (ref 32–36)
MCV RBC AUTO: 82.8 FL — SIGNIFICANT CHANGE UP (ref 80–100)
NRBC # BLD: 0 /100 WBCS — SIGNIFICANT CHANGE UP (ref 0–0)
PHOSPHATE SERPL-MCNC: 3.6 MG/DL — SIGNIFICANT CHANGE UP (ref 2.5–4.5)
PLATELET # BLD AUTO: 286 K/UL — SIGNIFICANT CHANGE UP (ref 150–400)
POTASSIUM SERPL-MCNC: 3.8 MMOL/L — SIGNIFICANT CHANGE UP (ref 3.5–5.3)
POTASSIUM SERPL-SCNC: 3.8 MMOL/L — SIGNIFICANT CHANGE UP (ref 3.5–5.3)
PROT SERPL-MCNC: 8.6 GM/DL — HIGH (ref 6–8.3)
PROTHROM AB SERPL-ACNC: 12.4 SEC — SIGNIFICANT CHANGE UP (ref 9.9–13.4)
RBC # BLD: 4.41 M/UL — SIGNIFICANT CHANGE UP (ref 4.2–5.8)
RBC # FLD: 14.4 % — SIGNIFICANT CHANGE UP (ref 10.3–14.5)
SODIUM SERPL-SCNC: 136 MMOL/L — SIGNIFICANT CHANGE UP (ref 135–145)
WBC # BLD: 5.72 K/UL — SIGNIFICANT CHANGE UP (ref 3.8–10.5)
WBC # FLD AUTO: 5.72 K/UL — SIGNIFICANT CHANGE UP (ref 3.8–10.5)

## 2024-12-13 PROCEDURE — 99232 SBSQ HOSP IP/OBS MODERATE 35: CPT

## 2024-12-13 PROCEDURE — 75984 XRAY CONTROL CATHETER CHANGE: CPT | Mod: 26

## 2024-12-13 PROCEDURE — 51705 CHANGE OF BLADDER TUBE: CPT

## 2024-12-13 RX ADMIN — Medication 40 MILLIGRAM(S): at 21:06

## 2024-12-13 RX ADMIN — OXYCODONE HYDROCHLORIDE 10 MILLIGRAM(S): 30 TABLET ORAL at 19:19

## 2024-12-13 RX ADMIN — Medication 325 MILLIGRAM(S): at 11:23

## 2024-12-13 RX ADMIN — Medication 2 TABLET(S): at 21:06

## 2024-12-13 RX ADMIN — GABAPENTIN 100 MILLIGRAM(S): 300 CAPSULE ORAL at 05:00

## 2024-12-13 RX ADMIN — OXYCODONE HYDROCHLORIDE 10 MILLIGRAM(S): 30 TABLET ORAL at 13:16

## 2024-12-13 RX ADMIN — GABAPENTIN 100 MILLIGRAM(S): 300 CAPSULE ORAL at 15:39

## 2024-12-13 RX ADMIN — OXYCODONE HYDROCHLORIDE 10 MILLIGRAM(S): 30 TABLET ORAL at 12:16

## 2024-12-13 RX ADMIN — OXYCODONE HYDROCHLORIDE 10 MILLIGRAM(S): 30 TABLET ORAL at 04:58

## 2024-12-13 RX ADMIN — LAMOTRIGINE 25 MILLIGRAM(S): 50 TABLET, EXTENDED RELEASE ORAL at 11:23

## 2024-12-13 RX ADMIN — GABAPENTIN 100 MILLIGRAM(S): 300 CAPSULE ORAL at 21:06

## 2024-12-13 RX ADMIN — OXYCODONE HYDROCHLORIDE 10 MILLIGRAM(S): 30 TABLET ORAL at 20:19

## 2024-12-13 RX ADMIN — Medication 60 MILLIGRAM(S): at 11:23

## 2024-12-13 RX ADMIN — OXYCODONE HYDROCHLORIDE 10 MILLIGRAM(S): 30 TABLET ORAL at 05:58
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
## 2024-12-13 NOTE — PROVIDER CONTACT NOTE (OTHER) - ASSESSMENT
AOX4. Hypotensive at 105/51. . Asymptomatic at this time. Left ac 20g noted but when RN attempted to flush - IV blew. CHUY Brown notified to US guided IV access for possible LR bolus.
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
## 2024-12-13 NOTE — PROVIDER CONTACT NOTE (OTHER) - SITUATION
Patient noted to be hypotensive, asymptomatic. Patient noted to be hypotensive, asymptomatic. /51. .

## 2024-12-13 NOTE — PROCEDURE NOTE - PROCEDURE FINDINGS AND DETAILS
Existing suprapubic catheter removed over a wire and upsized to 16F.   Contrast confirms position within the bladder.   No complications.
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
## 2024-12-14 ENCOUNTER — TRANSCRIPTION ENCOUNTER (OUTPATIENT)
Age: 34
End: 2024-12-14

## 2024-12-14 PROCEDURE — 99232 SBSQ HOSP IP/OBS MODERATE 35: CPT

## 2024-12-14 RX ORDER — APIXABAN 2.5 MG/1
5 TABLET, FILM COATED ORAL EVERY 12 HOURS
Refills: 0 | Status: DISCONTINUED | OUTPATIENT
Start: 2024-12-14 | End: 2024-12-16

## 2024-12-14 RX ADMIN — OXYCODONE HYDROCHLORIDE 10 MILLIGRAM(S): 30 TABLET ORAL at 04:11

## 2024-12-14 RX ADMIN — OXYCODONE HYDROCHLORIDE 10 MILLIGRAM(S): 30 TABLET ORAL at 03:11

## 2024-12-14 RX ADMIN — LAMOTRIGINE 25 MILLIGRAM(S): 50 TABLET, EXTENDED RELEASE ORAL at 12:38

## 2024-12-14 RX ADMIN — OXYCODONE HYDROCHLORIDE 10 MILLIGRAM(S): 30 TABLET ORAL at 10:15

## 2024-12-14 RX ADMIN — APIXABAN 5 MILLIGRAM(S): 2.5 TABLET, FILM COATED ORAL at 17:25

## 2024-12-14 RX ADMIN — OXYCODONE HYDROCHLORIDE 10 MILLIGRAM(S): 30 TABLET ORAL at 10:45

## 2024-12-14 RX ADMIN — OXYCODONE HYDROCHLORIDE 10 MILLIGRAM(S): 30 TABLET ORAL at 16:32

## 2024-12-14 RX ADMIN — OXYCODONE HYDROCHLORIDE 10 MILLIGRAM(S): 30 TABLET ORAL at 23:33

## 2024-12-14 RX ADMIN — OXYCODONE HYDROCHLORIDE 10 MILLIGRAM(S): 30 TABLET ORAL at 17:00

## 2024-12-14 RX ADMIN — GABAPENTIN 100 MILLIGRAM(S): 300 CAPSULE ORAL at 21:02

## 2024-12-14 RX ADMIN — Medication 2 TABLET(S): at 21:02

## 2024-12-14 RX ADMIN — Medication 40 MILLIGRAM(S): at 21:02

## 2024-12-14 RX ADMIN — GABAPENTIN 100 MILLIGRAM(S): 300 CAPSULE ORAL at 05:00

## 2024-12-14 RX ADMIN — OXYCODONE HYDROCHLORIDE 10 MILLIGRAM(S): 30 TABLET ORAL at 22:33

## 2024-12-14 RX ADMIN — Medication 60 MILLIGRAM(S): at 12:37

## 2024-12-14 RX ADMIN — GABAPENTIN 100 MILLIGRAM(S): 300 CAPSULE ORAL at 15:33

## 2024-12-14 RX ADMIN — Medication 325 MILLIGRAM(S): at 12:38

## 2024-12-14 RX ADMIN — Medication 10 MILLIGRAM(S): at 20:44

## 2024-12-14 NOTE — DISCHARGE NOTE NURSING/CASE MANAGEMENT/SOCIAL WORK - NSDCPEFALRISK_GEN_ALL_CORE
For information on Fall & Injury Prevention, visit: [email or web address] OR  [email or web address] OR  https://www.cdc.gov/steadi/patient.html

## 2024-12-14 NOTE — PROGRESS NOTE ADULT - ASSESSMENT
##Suprapubic catheter dysfunction.   Presented with SPC dysfunction. Afebrile, no leukocytosis. CT abd/pelvis with suprapubic catheter in place with pigtain in bladder lumen. Urology consulted, recommended IR exchange. IR consulted and replaced SPC on 12/14. Urology evaluated.   - C/w SPC management     # Chronic pain syndrome.   ·C/w oxy prn,gabapentin, cymbalta.    # Paraplegia.   - frequent turn/position q2hr  - C/w cyclobenzaprine.    # Hyperlipidemia, unspecified.   ·  C/w  atorvastatin 40mg hs.    # History of DVT (deep vein thrombosis).   - Restarted eliquis     Diet: reg  DVT prophylaxis: DOAC  Dispo: Clear for discharge but LTC cannot take patient back today  Code Status: FC     I have spent a total of 41  minutes to prepare to see the patient, obtaining and reviewing history, physical examination, explaining the diagnosis, prognosis and treatment plan with the patient/family/caregiver. I also have spent the time ordering studies and testing, interpreting results, medicine reconciliation, IDRs, subspecialty consultation and documentation as above.

## 2024-12-14 NOTE — DISCHARGE NOTE PROVIDER - NSDCCPCAREPLAN_GEN_ALL_CORE_FT
PRINCIPAL DISCHARGE DIAGNOSIS  Diagnosis: Suprapubic catheter dysfunction  Assessment and Plan of Treatment: SPC was replaced please follow up with your PCP and urologist for further management      SECONDARY DISCHARGE DIAGNOSES  Diagnosis: Paraplegia, unspecified  Assessment and Plan of Treatment:
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
## 2024-12-14 NOTE — DISCHARGE NOTE PROVIDER - NSDCMRMEDTOKEN_GEN_ALL_CORE_FT
cyclobenzaprine 10 mg oral tablet: 1 tab(s) orally every 8 hours  DULoxetine 60 mg oral delayed release capsule: 1 cap(s) orally once a day  Eliquis 5 mg oral tablet: 1 tab(s) orally 2 times a day  ferrous sulfate 325 mg (65 mg elemental iron) oral tablet: 1 tab(s) orally once a day  LaMICtal 25 mg oral tablet: 1 tab(s) orally once a day  Lipitor 40 mg oral tablet: 1 tab(s) orally once a day  Neurontin 100 mg oral capsule: 1 cap(s) orally every 8 hours  oxyCODONE 10 mg oral tablet: 1 tab(s) orally every 6 hours as needed for  severe pain  Pepcid 20 mg oral tablet: 1 tab(s) orally once a day  Senna-gen 8.6 mg oral tablet: 1 tab(s) orally 2 times a day

## 2024-12-14 NOTE — DISCHARGE NOTE PROVIDER - NSDCFUADDAPPT_GEN_ALL_CORE_FT
It is important to see your primary physician as well as the physicians noted below within the next week to perform a comprehensive medical review.  Call their offices for an appointment as soon as you leave the hospital.  You will also need to see them for renewal of your medications.  If you do not have a primary physician, contact the [facility] Physician Referral Service at [phone].  To obtain your results, you can access the [email or web address] Patient Portal at [email or web address].  Your medical issues appear to be stable at this time, but if your symptoms recur or worsen, contact your physicians and/or return to the hospital if necessary.  If you encounter any issues or questions with your medication, call your physicians before stopping the medication.

## 2024-12-14 NOTE — DISCHARGE NOTE NURSING/CASE MANAGEMENT/SOCIAL WORK - FINANCIAL ASSISTANCE
[facility] provides services at a reduced cost to those who are determined to be eligible through [facility]’s financial assistance program. Information regarding [facility]’s financial assistance program can be found by going to [email or web address] or by calling [phone].

## 2024-12-14 NOTE — DISCHARGE NOTE PROVIDER - DISCHARGE SERVICE FOR PATIENT
on the discharge service for the patient. I have reviewed and made amendments to the documentation where necessary. Information could not be obtained

## 2024-12-14 NOTE — DISCHARGE NOTE PROVIDER - NSDCCAREPROVSEEN_GEN_ALL_CORE_FT
Arie, Olu Hernández, Keith Montero, Hussein Glez, Александр Alvarez, Claudia Kaur, Eliza Dubon, Clara Coello, Salvador Gagnon, Deborah Francisco

## 2024-12-14 NOTE — DISCHARGE NOTE PROVIDER - PROVIDER TOKENS
FREE:[LAST:[pcp],PHONE:[(   )    -],FAX:[(   )    -],FOLLOWUP:[1 week]],FREE:[LAST:[urology],PHONE:[(   )    -],FAX:[(   )    -]]

## 2024-12-14 NOTE — DISCHARGE NOTE NURSING/CASE MANAGEMENT/SOCIAL WORK - PATIENT PORTAL LINK FT
You can access the FollowMyHealth Patient Portal offered by [facility] by registering at the following website: [email or web address]. By joining [employer]’s FollowMyHealth portal, you will also be able to view your health information using other applications (apps) compatible with our system.

## 2024-12-14 NOTE — DISCHARGE NOTE PROVIDER - CARE PROVIDER_API CALL
pcp,   Phone: (   )    -  Fax: (   )    -  Follow Up Time: 1 week    urology,   Phone: (   )    -  Fax: (   )    -  Follow Up Time:

## 2024-12-14 NOTE — DISCHARGE NOTE PROVIDER - ATTENDING DISCHARGE PHYSICAL EXAMINATION:
CONSTITUTIONAL: alert and cooperative, no acute distress  CARDIAC: Normal S1 and S2. Regular rate and rhythms.  LUNGS: Equal air entry both lungs. No rales, rhonchi, wheezing. Normal respiratory effort.   ABDOMEN: Soft, nondistended, nontender. No guarding or rebound tenderness. No hepatomegaly or splenomegaly. Bowel sound normal.  SPC +

## 2024-12-15 PROCEDURE — 99232 SBSQ HOSP IP/OBS MODERATE 35: CPT

## 2024-12-15 RX ADMIN — GABAPENTIN 100 MILLIGRAM(S): 300 CAPSULE ORAL at 05:28

## 2024-12-15 RX ADMIN — LAMOTRIGINE 25 MILLIGRAM(S): 50 TABLET, EXTENDED RELEASE ORAL at 11:11

## 2024-12-15 RX ADMIN — OXYCODONE HYDROCHLORIDE 10 MILLIGRAM(S): 30 TABLET ORAL at 11:10

## 2024-12-15 RX ADMIN — OXYCODONE HYDROCHLORIDE 10 MILLIGRAM(S): 30 TABLET ORAL at 18:21

## 2024-12-15 RX ADMIN — OXYCODONE HYDROCHLORIDE 10 MILLIGRAM(S): 30 TABLET ORAL at 19:21

## 2024-12-15 RX ADMIN — GABAPENTIN 100 MILLIGRAM(S): 300 CAPSULE ORAL at 15:15

## 2024-12-15 RX ADMIN — APIXABAN 5 MILLIGRAM(S): 2.5 TABLET, FILM COATED ORAL at 05:28

## 2024-12-15 RX ADMIN — Medication 2 TABLET(S): at 21:09

## 2024-12-15 RX ADMIN — OXYCODONE HYDROCHLORIDE 10 MILLIGRAM(S): 30 TABLET ORAL at 05:28

## 2024-12-15 RX ADMIN — GABAPENTIN 100 MILLIGRAM(S): 300 CAPSULE ORAL at 21:08

## 2024-12-15 RX ADMIN — Medication 325 MILLIGRAM(S): at 11:11

## 2024-12-15 RX ADMIN — APIXABAN 5 MILLIGRAM(S): 2.5 TABLET, FILM COATED ORAL at 18:21

## 2024-12-15 RX ADMIN — OXYCODONE HYDROCHLORIDE 10 MILLIGRAM(S): 30 TABLET ORAL at 06:28

## 2024-12-15 RX ADMIN — Medication 10 MILLIGRAM(S): at 18:21

## 2024-12-15 RX ADMIN — Medication 60 MILLIGRAM(S): at 11:10

## 2024-12-15 RX ADMIN — Medication 40 MILLIGRAM(S): at 21:09

## 2024-12-15 RX ADMIN — Medication 10 MILLIGRAM(S): at 12:08

## 2024-12-15 NOTE — PROGRESS NOTE ADULT - ASSESSMENT
##Suprapubic catheter dysfunction.   Presented with SPC dysfunction. Afebrile, no leukocytosis. CT abd/pelvis with suprapubic catheter in place with pigtain in bladder lumen. Urology consulted, recommended IR exchange. IR consulted and replaced SPC on 12/14. Urology evaluated and signed off.   - C/w SPC management     # Chronic pain syndrome.   ·C/w oxy prn,gabapentin, cymbalta.    # Paraplegia.   - frequent turn/position q2hr  - C/w cyclobenzaprine.    # Hyperlipidemia, unspecified.   ·  C/w  atorvastatin 40mg hs.    # History of DVT (deep vein thrombosis).   - Restarted eliquis     Diet: reg  DVT prophylaxis: DOAC  Dispo: Clear for discharge; likely tomorrow back to LTC   Code Status: FC     I have spent a total of 35 minutes to prepare to see the patient, obtaining and reviewing history, physical examination, explaining the diagnosis, prognosis and treatment plan with the patient/family/caregiver. I also have spent the time ordering studies and testing, interpreting results, medicine reconciliation, IDRs, subspecialty consultation and documentation as above.

## 2024-12-15 NOTE — PROGRESS NOTE ADULT - SUBJECTIVE AND OBJECTIVE BOX
Hospitalist Daily Progress Note     *SUBJECTIVE*    Interval Events:  NAEON, Resting comfortably. HD Stable.      *OBJECTIVE*    PHYSICAL EXAM:  CONSTITUTIONAL: alert and cooperative, no acute distress  CARDIAC: Normal S1 and S2. Regular rate and rhythms.  LUNGS: Equal air entry both lungs. No rales, rhonchi, wheezing. Normal respiratory effort.   ABDOMEN: Soft, nondistended, nontender. No guarding or rebound tenderness. No hepatomegaly or splenomegaly. Bowel sound normal.  SPC +    OBJECTIVE DATA:   Vital Signs Last 24 Hrs  T(C): 36.3 (13 Dec 2024 05:00), Max: 37.6 (12 Dec 2024 15:18)  T(F): 97.4 (13 Dec 2024 05:00), Max: 99.6 (12 Dec 2024 15:18)  HR: 85 (13 Dec 2024 05:00) (85 - 113)  BP: 115/73 (13 Dec 2024 06:50) (99/72 - 173/107)  BP(mean): --  RR: 16 (13 Dec 2024 05:00) (16 - 17)  SpO2: 100% (13 Dec 2024 05:00) (96% - 100%)    Parameters below as of 13 Dec 2024 05:00  Patient On (Oxygen Delivery Method): room air               Daily     Daily Weight in k (12 Dec 2024 18:15)    Labs, Interval Radiology studies, Medications reviewed by me        
Hospitalist Daily Progress Note     *SUBJECTIVE*    Interval Events:  NAEON, Resting comfortably. HD Stable.  new SPC draining clear urine     *OBJECTIVE*    PHYSICAL EXAM:  CONSTITUTIONAL: alert and cooperative, no acute distress  CARDIAC: Normal S1 and S2. Regular rate and rhythms.  LUNGS: Equal air entry both lungs. No rales, rhonchi, wheezing. Normal respiratory effort.   ABDOMEN: Soft, nondistended, nontender. No guarding or rebound tenderness. No hepatomegaly or splenomegaly. Bowel sound normal.  SPC +    OBJECTIVE DATA:   Vital Signs Last 24 Hrs  T(C): 36.7 (14 Dec 2024 11:41), Max: 36.7 (13 Dec 2024 17:17)  T(F): 98.1 (14 Dec 2024 11:41), Max: 98.1 (14 Dec 2024 11:41)  HR: 104 (14 Dec 2024 11:41) (67 - 104)  BP: 121/76 (14 Dec 2024 11:41) (115/75 - 135/90)  BP(mean): --  RR: 18 (14 Dec 2024 11:41) (18 - 20)  SpO2: 97% (14 Dec 2024 11:41) (95% - 100%)    Parameters below as of 14 Dec 2024 11:41  Patient On (Oxygen Delivery Method): room air               Daily     Daily     Labs, Interval Radiology studies, Medications reviewed by me        
Hospitalist Daily Progress Note     *SUBJECTIVE*    Interval Events:  NAEON, Resting comfortably. HD Stable.      *OBJECTIVE*    PHYSICAL EXAM:  CONSTITUTIONAL: alert and cooperative, no acute distress  CARDIAC: Normal S1 and S2. Regular rate and rhythms.  LUNGS: Equal air entry both lungs. No rales, rhonchi, wheezing. Normal respiratory effort.   ABDOMEN: Soft, nondistended, nontender. No guarding or rebound tenderness. No hepatomegaly or splenomegaly. Bowel sound normal.  SPC +    OBJECTIVE DATA:   Vital Signs Last 24 Hrs  T(C): 36.4 (15 Dec 2024 11:24), Max: 36.7 (14 Dec 2024 16:42)  T(F): 97.5 (15 Dec 2024 11:24), Max: 98.1 (14 Dec 2024 16:42)  HR: 87 (15 Dec 2024 11:24) (75 - 99)  BP: 148/93 (15 Dec 2024 05:07) (129/74 - 148/93)  BP(mean): --  RR: 18 (15 Dec 2024 11:24) (18 - 19)  SpO2: 100% (15 Dec 2024 11:24) (96% - 100%)    Parameters below as of 14 Dec 2024 16:42  Patient On (Oxygen Delivery Method): room air               Daily     Daily     Labs, Interval Radiology studies, Medications reviewed by me        
Patient seen and examined bedside resting comfortably.  No complaints offered.   SPC in place.   Denies N/V, chest pain, dyspnea, cough.    T(F): 97.2 (12-13-24 @ 11:10), Max: 99.1 (12-12-24 @ 18:15)  HR: 83 (12-13-24 @ 11:10) (83 - 111)  BP: 109/61 (12-13-24 @ 11:10) (99/72 - 173/107)  RR: 17 (12-13-24 @ 11:10) (16 - 17)  SpO2: 100% (12-13-24 @ 11:10) (98% - 100%)  Wt(kg): --  CAPILLARY BLOOD GLUCOSE          PHYSICAL EXAM:  General: NAD, alert and awake  HEENT: NCAT, EOMI, conjunctiva clear  Chest: Nonlabored respirations, good inspiratory effort  Abdomen: Soft, NTND.   Extremities: No pedal edema or calf tenderness noted   : No CVA or SP tenderness; 16F pigtail catheter in place draining clear, yellow urine     LABS:                        11.7   5.72  )-----------( 286      ( 13 Dec 2024 07:30 )             36.5   12-13    136  |  101  |  12  ----------------------------<  117[H]  3.8   |  27  |  0.75    Ca    9.0      13 Dec 2024 07:30  Phos  3.6     12-13  Mg     1.7     12-13    TPro  8.6[H]  /  Alb  3.5  /  TBili  0.5  /  DBili  x   /  AST  39[H]  /  ALT  53  /  AlkPhos  92  12-13  PT/INR - ( 13 Dec 2024 07:30 )   PT: 12.4 sec;   INR: 1.07 ratio         PTT - ( 13 Dec 2024 07:30 )  PTT:36.1 sec  I&O's Detail    12 Dec 2024 07:01  -  13 Dec 2024 07:00  --------------------------------------------------------  IN:    Oral Fluid: 711 mL  Total IN: 711 mL    OUT:    Indwelling Catheter - Suprapubic (mL): 350 mL    Voided (mL): 700 mL  Total OUT: 1050 mL    Total NET: -339 mL      13 Dec 2024 07:01  -  13 Dec 2024 17:00  --------------------------------------------------------  IN:  Total IN: 0 mL    OUT:    Indwelling Catheter - Suprapubic (mL): 500 mL  Total OUT: 500 mL    Total NET: -500 mL

## 2024-12-16 VITALS
SYSTOLIC BLOOD PRESSURE: 121 MMHG | HEART RATE: 99 BPM | DIASTOLIC BLOOD PRESSURE: 76 MMHG | TEMPERATURE: 98 F | OXYGEN SATURATION: 98 % | RESPIRATION RATE: 17 BRPM

## 2024-12-16 PROCEDURE — 99239 HOSP IP/OBS DSCHRG MGMT >30: CPT

## 2024-12-16 RX ADMIN — OXYCODONE HYDROCHLORIDE 10 MILLIGRAM(S): 30 TABLET ORAL at 12:22

## 2024-12-16 RX ADMIN — APIXABAN 5 MILLIGRAM(S): 2.5 TABLET, FILM COATED ORAL at 05:51

## 2024-12-16 RX ADMIN — OXYCODONE HYDROCHLORIDE 10 MILLIGRAM(S): 30 TABLET ORAL at 00:14

## 2024-12-16 RX ADMIN — Medication 325 MILLIGRAM(S): at 12:37

## 2024-12-16 RX ADMIN — GABAPENTIN 100 MILLIGRAM(S): 300 CAPSULE ORAL at 05:51

## 2024-12-16 RX ADMIN — GABAPENTIN 100 MILLIGRAM(S): 300 CAPSULE ORAL at 13:07

## 2024-12-16 RX ADMIN — OXYCODONE HYDROCHLORIDE 10 MILLIGRAM(S): 30 TABLET ORAL at 06:20

## 2024-12-16 RX ADMIN — Medication 10 MILLIGRAM(S): at 12:37

## 2024-12-16 RX ADMIN — OXYCODONE HYDROCHLORIDE 10 MILLIGRAM(S): 30 TABLET ORAL at 07:20

## 2024-12-16 RX ADMIN — OXYCODONE HYDROCHLORIDE 10 MILLIGRAM(S): 30 TABLET ORAL at 01:14

## 2024-12-16 RX ADMIN — OXYCODONE HYDROCHLORIDE 10 MILLIGRAM(S): 30 TABLET ORAL at 13:07

## 2024-12-16 RX ADMIN — Medication 60 MILLIGRAM(S): at 12:37

## 2024-12-16 RX ADMIN — LAMOTRIGINE 25 MILLIGRAM(S): 50 TABLET, EXTENDED RELEASE ORAL at 12:37

## 2024-12-22 DIAGNOSIS — F32.9 MAJOR DEPRESSIVE DISORDER, SINGLE EPISODE, UNSPECIFIED: ICD-10-CM

## 2024-12-22 DIAGNOSIS — L89.224 PRESSURE ULCER OF LEFT HIP, STAGE 4: ICD-10-CM

## 2024-12-22 DIAGNOSIS — E78.5 HYPERLIPIDEMIA, UNSPECIFIED: ICD-10-CM

## 2024-12-22 DIAGNOSIS — I73.9 PERIPHERAL VASCULAR DISEASE, UNSPECIFIED: ICD-10-CM

## 2024-12-22 DIAGNOSIS — K21.9 GASTRO-ESOPHAGEAL REFLUX DISEASE WITHOUT ESOPHAGITIS: ICD-10-CM

## 2024-12-22 DIAGNOSIS — L89.214 PRESSURE ULCER OF RIGHT HIP, STAGE 4: ICD-10-CM

## 2024-12-22 DIAGNOSIS — G82.20 PARAPLEGIA, UNSPECIFIED: ICD-10-CM

## 2024-12-22 DIAGNOSIS — G89.4 CHRONIC PAIN SYNDROME: ICD-10-CM

## 2024-12-22 DIAGNOSIS — Z79.899 OTHER LONG TERM (CURRENT) DRUG THERAPY: ICD-10-CM

## 2024-12-22 DIAGNOSIS — T83.010A BREAKDOWN (MECHANICAL) OF CYSTOSTOMY CATHETER, INITIAL ENCOUNTER: ICD-10-CM

## 2024-12-22 DIAGNOSIS — R33.9 RETENTION OF URINE, UNSPECIFIED: ICD-10-CM

## 2024-12-22 DIAGNOSIS — Z86.718 PERSONAL HISTORY OF OTHER VENOUS THROMBOSIS AND EMBOLISM: ICD-10-CM

## 2024-12-22 DIAGNOSIS — L89.154 PRESSURE ULCER OF SACRAL REGION, STAGE 4: ICD-10-CM

## 2024-12-22 DIAGNOSIS — N31.9 NEUROMUSCULAR DYSFUNCTION OF BLADDER, UNSPECIFIED: ICD-10-CM

## 2024-12-22 DIAGNOSIS — Z93.3 COLOSTOMY STATUS: ICD-10-CM

## 2024-12-22 DIAGNOSIS — Z79.01 LONG TERM (CURRENT) USE OF ANTICOAGULANTS: ICD-10-CM

## 2024-12-25 ENCOUNTER — EMERGENCY (EMERGENCY)
Facility: HOSPITAL | Age: 34
LOS: 0 days | Discharge: HOME | End: 2024-12-26
Attending: EMERGENCY MEDICINE
Payer: MEDICAID

## 2024-12-25 VITALS
HEIGHT: 74 IN | RESPIRATION RATE: 17 BRPM | OXYGEN SATURATION: 98 % | SYSTOLIC BLOOD PRESSURE: 142 MMHG | HEART RATE: 69 BPM | DIASTOLIC BLOOD PRESSURE: 87 MMHG | TEMPERATURE: 98 F | WEIGHT: 274.03 LBS

## 2024-12-25 DIAGNOSIS — Z88.4 ALLERGY STATUS TO ANESTHETIC AGENT: ICD-10-CM
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
## 2024-12-25 DIAGNOSIS — Z95.828 PRESENCE OF OTHER VASCULAR IMPLANTS AND GRAFTS: Chronic | ICD-10-CM

## 2024-12-25 DIAGNOSIS — I10 ESSENTIAL (PRIMARY) HYPERTENSION: ICD-10-CM
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
## 2024-12-25 DIAGNOSIS — G89.29 OTHER CHRONIC PAIN: ICD-10-CM

## 2024-12-25 DIAGNOSIS — Z93.3 COLOSTOMY STATUS: Chronic | ICD-10-CM

## 2024-12-25 DIAGNOSIS — Z79.01 LONG TERM (CURRENT) USE OF ANTICOAGULANTS: ICD-10-CM

## 2024-12-25 DIAGNOSIS — G82.20 PARAPLEGIA, UNSPECIFIED: ICD-10-CM

## 2024-12-25 DIAGNOSIS — E73.9 LACTOSE INTOLERANCE, UNSPECIFIED: ICD-10-CM

## 2024-12-25 PROCEDURE — 99284 EMERGENCY DEPT VISIT MOD MDM: CPT

## 2024-12-25 PROCEDURE — 72100 X-RAY EXAM L-S SPINE 2/3 VWS: CPT | Mod: 26

## 2024-12-25 RX ORDER — OXYCODONE HYDROCHLORIDE 30 MG/1
10 TABLET ORAL ONCE
Refills: 0 | Status: DISCONTINUED | OUTPATIENT
Start: 2024-12-25 | End: 2024-12-25

## 2024-12-25 RX ORDER — CYCLOBENZAPRINE HCL 10 MG
10 TABLET ORAL ONCE
Refills: 0 | Status: COMPLETED | OUTPATIENT
Start: 2024-12-25 | End: 2024-12-25

## 2024-12-25 RX ADMIN — Medication 10 MILLIGRAM(S): at 18:08

## 2024-12-25 RX ADMIN — OXYCODONE HYDROCHLORIDE 10 MILLIGRAM(S): 30 TABLET ORAL at 19:25

## 2024-12-25 RX ADMIN — OXYCODONE HYDROCHLORIDE 10 MILLIGRAM(S): 30 TABLET ORAL at 18:08
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
## 2024-12-25 NOTE — ED PROVIDER NOTE - NSFOLLOWUPINSTRUCTIONS_ED_ALL_ED_FT
Chronic Back Pain    WHAT YOU NEED TO KNOW:    What is chronic back pain? Chronic back pain is back pain that lasts 3 months or longer. This may include pain that has not been controlled or does not improve with treatment. Your back pain may cause weakness or pain that spreads to your arms or legs.    What causes or increases my risk for chronic back pain?    A condition that affects your spine, joints, or muscles, such as arthritis, muscle tension, or breakdown of spinal discs    Aging    Lack of regular physical activity    Repeated bending, lifting, or twisting, or lifting heavy items    Obesity or pregnancy    Injury from a fall or accident    Driving, sitting, or standing for long periods    Bad posture while sitting or standing  How is chronic back pain diagnosed? Your healthcare provider will ask if you have any medical conditions. He or she may ask if you have a history of back pain and how it started. He or she may watch you stand and walk, and check your range of motion. Show him or her where you feel pain and what makes it better or worse. Describe the pain, how bad it is, and how long it lasts. Tell your provider if your pain worsens at night or when you lie on your back.    How is chronic back pain treated?    NSAIDs help decrease swelling and pain or fever. This medicine is available with or without a doctor's order. NSAIDs can cause stomach bleeding or kidney problems in certain people. If you take blood thinner medicine, always ask your healthcare provider if NSAIDs are safe for you. Always read the medicine label and follow directions.    Acetaminophen decreases pain and fever. It is available without a doctor's order. Ask how much to take and how often to take it. Follow directions. Read the labels of all other medicines you are using to see if they also contain acetaminophen, or ask your doctor or pharmacist. Acetaminophen can cause liver damage if not taken correctly.    Prescription pain medicine called narcotics or opioids may be given for certain types of chronic pain. Ask your healthcare provider how to take this medicine safely.    Muscle relaxers help decrease pain and muscle spasms.    Steroids decrease inflammation that causes pain.    Anesthetic medicines may be injected in or around a nerve to block pain signals from the nerves.    Antidepressants may be used to help decrease or prevent the symptoms of depression or anxiety. They are also used to treat nerve pain.  How can I manage my symptoms?    Apply ice for 15 to 20 minutes every hour, or as directed. Use an ice pack, or put crushed ice in a plastic bag. Cover it with a towel before you apply it to your skin. Ice decreases pain and helps prevent tissue damage.    Apply heat for 20 to 30 minutes every 2 hours, or as directed. Heat helps decrease pain and muscle spasms.    Use massage to loosen tense muscles. Massage may relieve back pain caused by tight muscles. Regular massages may help prevent this kind of back pain.    Ask about acupuncture for pain relief. Back pain is sometimes relieved with acupuncture. Talk to your healthcare provider before you get this treatment to make sure it is safe for you.  What else can I do to relieve or prevent back pain?    Manage stress. Stress can cause back pain or make it worse. Some ways to reduce stress are listening to music, meditating, or using aromatherapy. It may help to talk with a therapist about anything that is causing you stress. Your healthcare provider can give you more information.    Stay active as much as you can without causing more pain. Ask your healthcare provider which exercises are right for you. Do not sit or lie down for long periods. This could make your back pain worse. Yoga or similar gentle movements may help relieve pain and tension in your back. Go slowly and do not strain your back as you do any movement.    Be careful when you lift heavy objects. Do not lift anything heavy until your pain is gone. Never strain your back when you lift a heavy item. If possible, ask someone to help you.    Go to physical therapy as directed. A physical therapist can teach you exercises to help improve movement and strength, and to decrease pain.  When should I call my doctor?    You have severe pain.    You have new numbness, tingling, or weakness, especially in your lower back, legs, arms, or genital area.    You lose control of your bladder or bowel movements.    You have a fever or sudden weight loss.    You have new or worse pain.    You have questions or concerns about your condition or care.  CARE AGREEMENT:    You have the right to help plan your care. Learn about your health condition and how it may be treated. Discuss treatment options with your healthcare providers to decide what care you want to receive. You always have the right to refuse treatment.

## 2024-12-25 NOTE — ED ADULT NURSE NOTE - CHIEF COMPLAINT QUOTE
MENDEZ from [facility] for unwitnessed fall, as per emt, around 1300 patient asked for assistance from bed to wheelchair, 30 mins after, patient started to cramp and have tingling sensation on b/l legs that caused him to fall off his wheelchair landing on right side of body. takes blood thinners, denies head injury, denies loc  patient is paraplegic
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
## 2024-12-25 NOTE — ED ADULT NURSE NOTE - OBJECTIVE STATEMENT
received er bed h27 shantea from [facility] s/p slipped from wheelchair onto floor, landed on right side denies head injury states had muscle spasms in legs caused him to slide from chair to floor pt is paraplegic with chronic pain/muscle spasms no obvious injury or deformity noted

## 2024-12-25 NOTE — ED PROVIDER NOTE - PROGRESS NOTE DETAILS
No signout given on patient.  Nurse reports patient for ambulance  having hypertension.  Patient not on medications he reports elevations happens when in pain however nurse reports persistent elevations.  Clonidine and pain medication given.

## 2024-12-25 NOTE — ED PROVIDER NOTE - CLINICAL SUMMARY MEDICAL DECISION MAKING FREE TEXT BOX
Patient with fall from chair otherwise with some previous back pain already noted chronically.  X-rays of the lumbar spine without notable acute findings.  Will otherwise discharge back to rehab facility for further care.

## 2024-12-25 NOTE — ED ADULT NURSE NOTE - PAIN RATING/NUMBER SCALE (0-10): ACTIVITY

## 2024-12-25 NOTE — ED ADULT NURSE NOTE - CHIEF COMPLAINT
Unable to refill due to no med refill protocol-MD TO REVIEW    The patient is a 34y Male complaining of fall.

## 2024-12-25 NOTE — ED ADULT TRIAGE NOTE - CHIEF COMPLAINT QUOTE
MNEDEZ from [facility] for unwitnessed fall, as per emt, around 1300 patient asked for assistance from bed to wheelchair, 30 mins after, patient started to cramp and have tingling sensation on b/l legs that caused him to fall off his wheelchair landing on right side of body. takes blood thinners, denies head injury, denies loc  patient is paraplegic

## 2024-12-25 NOTE — ED PROVIDER NOTE - PATIENT PORTAL LINK FT
You can access the FollowMyHealth Patient Portal offered by [facility] by registering at the following website: [email or web address]. By joining [facility]’s FollowMyHealth portal, you will also be able to view your health information using other applications (apps) compatible with our system.

## 2024-12-26 VITALS
HEART RATE: 70 BPM | SYSTOLIC BLOOD PRESSURE: 148 MMHG | OXYGEN SATURATION: 96 % | DIASTOLIC BLOOD PRESSURE: 93 MMHG | RESPIRATION RATE: 19 BRPM

## 2024-12-26 RX ORDER — CLONIDINE HYDROCHLORIDE 0.3 MG/1
0.1 TABLET ORAL ONCE
Refills: 0 | Status: COMPLETED | OUTPATIENT
Start: 2024-12-26 | End: 2024-12-26

## 2024-12-26 RX ADMIN — CLONIDINE HYDROCHLORIDE 0.1 MILLIGRAM(S): 0.3 TABLET ORAL at 01:35

## 2025-03-24 NOTE — H&P ADULT - NSHPPHYSICALEXAM_GEN_ALL_CORE
Patient's blood pressure range in the last 24 hours was: BP  Min: 112/78  Max: 162/94.The patient's inpatient anti-hypertensive regimen is listed below:  Current Antihypertensives  amLODIPine tablet 10 mg, Daily, Oral  metoprolol succinate (TOPROL-XL) 24 hr tablet 50 mg, Daily, Oral  furosemide injection 40 mg, 2 times daily, Intravenous  hydrALAZINE injection 10 mg, Every 6 hours PRN, Intravenous       PHYSICAL EXAM:  GENERAL: NAD, comfortable in bed.   LUNG: Clear to auscultation bilaterally; No wheezes, rales or rhonchi  HEART: Regular rate and rhythm; No murmurs, rubs, or gallops  ABDOMEN: +BS, Soft, TTP at suprapubic area. Suprapubic Catheter on the left side. Ex lap & Ostomy surgical scars  EXTREMITIES:  No clubbing, cyanosis, or edema  PSYCH: normal mood and affect  NEUROLOGY: AAOx3, non-focal

## 2025-04-09 ENCOUNTER — APPOINTMENT (OUTPATIENT)
Dept: UROLOGY | Facility: CLINIC | Age: 35
End: 2025-04-09
Payer: MEDICAID

## 2025-04-09 VITALS
SYSTOLIC BLOOD PRESSURE: 162 MMHG | HEART RATE: 96 BPM | WEIGHT: 268 LBS | DIASTOLIC BLOOD PRESSURE: 98 MMHG | OXYGEN SATURATION: 100 % | BODY MASS INDEX: 34.39 KG/M2 | TEMPERATURE: 97.9 F | HEIGHT: 74 IN

## 2025-04-09 DIAGNOSIS — R33.9 RETENTION OF URINE, UNSPECIFIED: ICD-10-CM

## 2025-04-09 DIAGNOSIS — Z78.9 OTHER SPECIFIED HEALTH STATUS: ICD-10-CM

## 2025-04-09 DIAGNOSIS — Z93.59 OTHER CYSTOSTOMY STATUS: ICD-10-CM

## 2025-04-09 PROCEDURE — 51710 CHANGE OF BLADDER TUBE: CPT

## 2025-04-09 RX ORDER — MULTIVIT-MIN/FOLIC/VIT K/LYCOP 400-300MCG
250 TABLET ORAL
Refills: 0 | Status: ACTIVE | COMMUNITY

## 2025-04-09 RX ORDER — ACETAMINOPHEN 325 MG/1
325 TABLET ORAL
Refills: 0 | Status: ACTIVE | COMMUNITY

## 2025-04-09 RX ORDER — DOCUSATE SODIUM 100 MG/1
100 CAPSULE ORAL
Refills: 0 | Status: ACTIVE | COMMUNITY

## 2025-04-09 RX ORDER — OXYCODONE 10 MG/1
10 TABLET ORAL
Refills: 0 | Status: ACTIVE | COMMUNITY

## 2025-04-09 RX ORDER — CYCLOBENZAPRINE HYDROCHLORIDE 10 MG/1
10 TABLET, FILM COATED ORAL
Refills: 0 | Status: ACTIVE | COMMUNITY

## 2025-04-09 RX ORDER — SENNOSIDES 8.6 MG TABLETS 8.6 MG/1
8.6 TABLET ORAL
Refills: 0 | Status: ACTIVE | COMMUNITY

## 2025-04-09 RX ORDER — CHLORHEXIDINE GLUCONATE 4 %
325 (65 FE) LIQUID (ML) TOPICAL
Refills: 0 | Status: ACTIVE | COMMUNITY

## 2025-04-09 RX ORDER — ATORVASTATIN CALCIUM 40 MG/1
40 TABLET, FILM COATED ORAL
Refills: 0 | Status: ACTIVE | COMMUNITY

## 2025-04-09 RX ORDER — DULOXETINE HYDROCHLORIDE 60 MG/1
60 CAPSULE, DELAYED RELEASE ORAL
Refills: 0 | Status: ACTIVE | COMMUNITY

## 2025-04-09 RX ORDER — GLUCOSAMINE HCL/CHONDROITIN SU 500-400 MG
3 CAPSULE ORAL
Refills: 0 | Status: ACTIVE | COMMUNITY

## 2025-04-09 RX ORDER — BUSPIRONE HYDROCHLORIDE 5 MG/1
5 TABLET ORAL
Refills: 0 | Status: ACTIVE | COMMUNITY

## 2025-04-09 RX ORDER — APIXABAN 5 MG/1
5 TABLET, FILM COATED ORAL
Refills: 0 | Status: ACTIVE | COMMUNITY

## 2025-04-09 RX ORDER — LAMOTRIGINE 25 MG/1
25 TABLET ORAL
Refills: 0 | Status: ACTIVE | COMMUNITY

## 2025-04-09 RX ORDER — GABAPENTIN 100 MG/1
100 CAPSULE ORAL
Refills: 0 | Status: ACTIVE | COMMUNITY

## 2025-04-09 RX ORDER — LORATADINE 10 MG
17 TABLET,DISINTEGRATING ORAL
Refills: 0 | Status: ACTIVE | COMMUNITY

## 2025-04-21 NOTE — DISCHARGE NOTE ADULT - PROVIDER TOKENS
Pt is requesting a refill of Linzess 290 cap    Last refill: 2/14/2024   Last ov: 2/7/2024  Next ov: 5/1/2025    
FREE:[LAST:[Brandon],FIRST:[Josselin],PHONE:[(   )    -],FAX:[(   )    -]]

## 2025-05-01 ENCOUNTER — TRANSCRIPTION ENCOUNTER (OUTPATIENT)
Age: 35
End: 2025-05-01

## 2025-05-01 ENCOUNTER — INPATIENT (INPATIENT)
Facility: HOSPITAL | Age: 35
LOS: 0 days | Discharge: NURSING, REHAB OR LONG-TERM CARE | End: 2025-05-02
Attending: INTERNAL MEDICINE | Admitting: INTERNAL MEDICINE
Payer: MEDICAID

## 2025-05-01 VITALS
SYSTOLIC BLOOD PRESSURE: 100 MMHG | HEIGHT: 74 IN | OXYGEN SATURATION: 97 % | DIASTOLIC BLOOD PRESSURE: 63 MMHG | HEART RATE: 97 BPM | TEMPERATURE: 98 F | RESPIRATION RATE: 20 BRPM | WEIGHT: 265 LBS

## 2025-05-01 DIAGNOSIS — G82.20 PARAPLEGIA, UNSPECIFIED: ICD-10-CM

## 2025-05-01 DIAGNOSIS — Z86.718 PERSONAL HISTORY OF OTHER VENOUS THROMBOSIS AND EMBOLISM: ICD-10-CM

## 2025-05-01 DIAGNOSIS — E78.5 HYPERLIPIDEMIA, UNSPECIFIED: ICD-10-CM

## 2025-05-01 DIAGNOSIS — Z29.9 ENCOUNTER FOR PROPHYLACTIC MEASURES, UNSPECIFIED: ICD-10-CM

## 2025-05-01 DIAGNOSIS — Z93.3 COLOSTOMY STATUS: Chronic | ICD-10-CM

## 2025-05-01 DIAGNOSIS — Z95.828 PRESENCE OF OTHER VASCULAR IMPLANTS AND GRAFTS: Chronic | ICD-10-CM

## 2025-05-01 DIAGNOSIS — S92.912B: ICD-10-CM

## 2025-05-01 LAB
APTT BLD: 38.6 SEC — HIGH (ref 26.1–36.8)
BASOPHILS # BLD AUTO: 0.06 K/UL — SIGNIFICANT CHANGE UP (ref 0–0.2)
BASOPHILS NFR BLD AUTO: 0.7 % — SIGNIFICANT CHANGE UP (ref 0–2)
EOSINOPHIL # BLD AUTO: 0.24 K/UL — SIGNIFICANT CHANGE UP (ref 0–0.5)
EOSINOPHIL NFR BLD AUTO: 2.7 % — SIGNIFICANT CHANGE UP (ref 0–6)
ERYTHROCYTE [SEDIMENTATION RATE] IN BLOOD: 64 MM/HR — HIGH (ref 0–15)
HCT VFR BLD CALC: 37 % — LOW (ref 39–50)
HGB BLD-MCNC: 12.1 G/DL — LOW (ref 13–17)
IMM GRANULOCYTES NFR BLD AUTO: 0.3 % — SIGNIFICANT CHANGE UP (ref 0–0.9)
INR BLD: 1.09 RATIO — SIGNIFICANT CHANGE UP (ref 0.85–1.16)
LYMPHOCYTES # BLD AUTO: 2.84 K/UL — SIGNIFICANT CHANGE UP (ref 1–3.3)
LYMPHOCYTES # BLD AUTO: 31.6 % — SIGNIFICANT CHANGE UP (ref 13–44)
MCHC RBC-ENTMCNC: 27 PG — SIGNIFICANT CHANGE UP (ref 27–34)
MCHC RBC-ENTMCNC: 32.7 G/DL — SIGNIFICANT CHANGE UP (ref 32–36)
MCV RBC AUTO: 82.6 FL — SIGNIFICANT CHANGE UP (ref 80–100)
MONOCYTES # BLD AUTO: 0.46 K/UL — SIGNIFICANT CHANGE UP (ref 0–0.9)
MONOCYTES NFR BLD AUTO: 5.1 % — SIGNIFICANT CHANGE UP (ref 2–14)
NEUTROPHILS # BLD AUTO: 5.36 K/UL — SIGNIFICANT CHANGE UP (ref 1.8–7.4)
NEUTROPHILS NFR BLD AUTO: 59.6 % — SIGNIFICANT CHANGE UP (ref 43–77)
NRBC BLD AUTO-RTO: 0 /100 WBCS — SIGNIFICANT CHANGE UP (ref 0–0)
PLATELET # BLD AUTO: 380 K/UL — SIGNIFICANT CHANGE UP (ref 150–400)
PROTHROM AB SERPL-ACNC: 12.6 SEC — SIGNIFICANT CHANGE UP (ref 9.9–13.4)
RBC # BLD: 4.48 M/UL — SIGNIFICANT CHANGE UP (ref 4.2–5.8)
RBC # FLD: 14.2 % — SIGNIFICANT CHANGE UP (ref 10.3–14.5)
WBC # BLD: 8.99 K/UL — SIGNIFICANT CHANGE UP (ref 3.8–10.5)
WBC # FLD AUTO: 8.99 K/UL — SIGNIFICANT CHANGE UP (ref 3.8–10.5)

## 2025-05-01 PROCEDURE — 73630 X-RAY EXAM OF FOOT: CPT | Mod: 26,LT

## 2025-05-01 PROCEDURE — 99285 EMERGENCY DEPT VISIT HI MDM: CPT

## 2025-05-01 PROCEDURE — 73660 X-RAY EXAM OF TOE(S): CPT | Mod: 26,LT

## 2025-05-01 PROCEDURE — 99222 1ST HOSP IP/OBS MODERATE 55: CPT

## 2025-05-01 DEVICE — K-WIRE MICROAIRE (SMOOTH) DOUBLE TROCAR 1.1MM X 4": Type: IMPLANTABLE DEVICE | Site: LEFT | Status: FUNCTIONAL

## 2025-05-01 RX ORDER — HYDROMORPHONE/SOD CHLOR,ISO/PF 2 MG/10 ML
0.5 SYRINGE (ML) INJECTION
Refills: 0 | Status: DISCONTINUED | OUTPATIENT
Start: 2025-05-01 | End: 2025-05-01

## 2025-05-01 RX ORDER — ONDANSETRON HCL/PF 4 MG/2 ML
4 VIAL (ML) INJECTION EVERY 8 HOURS
Refills: 0 | Status: DISCONTINUED | OUTPATIENT
Start: 2025-05-01 | End: 2025-05-02

## 2025-05-01 RX ORDER — DULOXETINE 20 MG/1
60 CAPSULE, DELAYED RELEASE ORAL DAILY
Refills: 0 | Status: DISCONTINUED | OUTPATIENT
Start: 2025-05-01 | End: 2025-05-02

## 2025-05-01 RX ORDER — CEFAZOLIN SODIUM IN 0.9 % NACL 3 G/100 ML
1000 INTRAVENOUS SOLUTION, PIGGYBACK (ML) INTRAVENOUS ONCE
Refills: 0 | Status: COMPLETED | OUTPATIENT
Start: 2025-05-01 | End: 2025-05-01

## 2025-05-01 RX ORDER — LAMOTRIGINE 150 MG/1
25 TABLET ORAL DAILY
Refills: 0 | Status: DISCONTINUED | OUTPATIENT
Start: 2025-05-01 | End: 2025-05-02

## 2025-05-01 RX ORDER — CEFAZOLIN SODIUM IN 0.9 % NACL 3 G/100 ML
1000 INTRAVENOUS SOLUTION, PIGGYBACK (ML) INTRAVENOUS EVERY 8 HOURS
Refills: 0 | Status: DISCONTINUED | OUTPATIENT
Start: 2025-05-01 | End: 2025-05-02

## 2025-05-01 RX ORDER — CYCLOBENZAPRINE HYDROCHLORIDE 15 MG/1
10 CAPSULE, EXTENDED RELEASE ORAL ONCE
Refills: 0 | Status: COMPLETED | OUTPATIENT
Start: 2025-05-01 | End: 2025-05-01

## 2025-05-01 RX ORDER — ONDANSETRON HCL/PF 4 MG/2 ML
4 VIAL (ML) INJECTION ONCE
Refills: 0 | Status: DISCONTINUED | OUTPATIENT
Start: 2025-05-01 | End: 2025-05-01

## 2025-05-01 RX ORDER — OXYCODONE HYDROCHLORIDE 30 MG/1
10 TABLET ORAL EVERY 6 HOURS
Refills: 0 | Status: DISCONTINUED | OUTPATIENT
Start: 2025-05-01 | End: 2025-05-02

## 2025-05-01 RX ORDER — FENTANYL CITRATE-0.9 % NACL/PF 100MCG/2ML
25 SYRINGE (ML) INTRAVENOUS
Refills: 0 | Status: DISCONTINUED | OUTPATIENT
Start: 2025-05-01 | End: 2025-05-01

## 2025-05-01 RX ORDER — GABAPENTIN 400 MG/1
100 CAPSULE ORAL EVERY 8 HOURS
Refills: 0 | Status: DISCONTINUED | OUTPATIENT
Start: 2025-05-01 | End: 2025-05-02

## 2025-05-01 RX ORDER — MELATONIN 5 MG
3 TABLET ORAL AT BEDTIME
Refills: 0 | Status: DISCONTINUED | OUTPATIENT
Start: 2025-05-01 | End: 2025-05-02

## 2025-05-01 RX ORDER — SENNA 187 MG
2 TABLET ORAL AT BEDTIME
Refills: 0 | Status: DISCONTINUED | OUTPATIENT
Start: 2025-05-01 | End: 2025-05-02

## 2025-05-01 RX ORDER — ATORVASTATIN CALCIUM 80 MG/1
40 TABLET, FILM COATED ORAL AT BEDTIME
Refills: 0 | Status: DISCONTINUED | OUTPATIENT
Start: 2025-05-01 | End: 2025-05-02

## 2025-05-01 RX ORDER — ACETAMINOPHEN 500 MG/5ML
650 LIQUID (ML) ORAL EVERY 6 HOURS
Refills: 0 | Status: DISCONTINUED | OUTPATIENT
Start: 2025-05-01 | End: 2025-05-01

## 2025-05-01 RX ORDER — CLOSTRIDIUM TETANI TOXOID ANTIGEN (FORMALDEHYDE INACTIVATED), CORYNEBACTERIUM DIPHTHERIAE TOXOID ANTIGEN (FORMALDEHYDE INACTIVATED), BORDETELLA PERTUSSIS TOXOID ANTIGEN (GLUTARALDEHYDE INACTIVATED), BORDETELLA PERTUSSIS FILAMENTOUS HEMAGGLUTININ ANTIGEN (FORMALDEHYDE INACTIVATED), BORDETELLA PERTUSSIS PERTACTIN ANTIGEN, AND BORDETELLA PERTUSSIS FIMBRIAE 2/3 ANTIGEN 5; 2; 2.5; 5; 3; 5 [LF]/.5ML; [LF]/.5ML; UG/.5ML; UG/.5ML; UG/.5ML; UG/.5ML
0.5 INJECTION, SUSPENSION INTRAMUSCULAR ONCE
Refills: 0 | Status: COMPLETED | OUTPATIENT
Start: 2025-05-01 | End: 2025-05-01

## 2025-05-01 RX ORDER — SODIUM CHLORIDE 9 G/1000ML
1000 INJECTION, SOLUTION INTRAVENOUS
Refills: 0 | Status: DISCONTINUED | OUTPATIENT
Start: 2025-05-01 | End: 2025-05-01

## 2025-05-01 RX ORDER — MAGNESIUM, ALUMINUM HYDROXIDE 200-200 MG
30 TABLET,CHEWABLE ORAL EVERY 4 HOURS
Refills: 0 | Status: DISCONTINUED | OUTPATIENT
Start: 2025-05-01 | End: 2025-05-02

## 2025-05-01 RX ORDER — CYCLOBENZAPRINE HYDROCHLORIDE 15 MG/1
10 CAPSULE, EXTENDED RELEASE ORAL THREE TIMES A DAY
Refills: 0 | Status: DISCONTINUED | OUTPATIENT
Start: 2025-05-01 | End: 2025-05-02

## 2025-05-01 RX ORDER — OXYCODONE HYDROCHLORIDE AND ACETAMINOPHEN 10; 325 MG/1; MG/1
1 TABLET ORAL EVERY 4 HOURS
Refills: 0 | Status: DISCONTINUED | OUTPATIENT
Start: 2025-05-01 | End: 2025-05-02

## 2025-05-01 RX ORDER — ACETAMINOPHEN 500 MG/5ML
650 LIQUID (ML) ORAL EVERY 6 HOURS
Refills: 0 | Status: DISCONTINUED | OUTPATIENT
Start: 2025-05-01 | End: 2025-05-02

## 2025-05-01 RX ORDER — FERROUS SULFATE 137(45) MG
325 TABLET, EXTENDED RELEASE ORAL DAILY
Refills: 0 | Status: DISCONTINUED | OUTPATIENT
Start: 2025-05-01 | End: 2025-05-02

## 2025-05-01 RX ADMIN — Medication 100 MILLIGRAM(S): at 23:10

## 2025-05-01 RX ADMIN — CLOSTRIDIUM TETANI TOXOID ANTIGEN (FORMALDEHYDE INACTIVATED), CORYNEBACTERIUM DIPHTHERIAE TOXOID ANTIGEN (FORMALDEHYDE INACTIVATED), BORDETELLA PERTUSSIS TOXOID ANTIGEN (GLUTARALDEHYDE INACTIVATED), BORDETELLA PERTUSSIS FILAMENTOUS HEMAGGLUTININ ANTIGEN (FORMALDEHYDE INACTIVATED), BORDETELLA PERTUSSIS PERTACTIN ANTIGEN, AND BORDETELLA PERTUSSIS FIMBRIAE 2/3 ANTIGEN 0.5 MILLILITER(S): 5; 2; 2.5; 5; 3; 5 INJECTION, SUSPENSION INTRAMUSCULAR at 14:12

## 2025-05-01 RX ADMIN — OXYCODONE HYDROCHLORIDE 10 MILLIGRAM(S): 30 TABLET ORAL at 23:12

## 2025-05-01 RX ADMIN — Medication 2 TABLET(S): at 23:10

## 2025-05-01 RX ADMIN — SODIUM CHLORIDE 100 MILLILITER(S): 9 INJECTION, SOLUTION INTRAVENOUS at 20:48

## 2025-05-01 RX ADMIN — GABAPENTIN 100 MILLIGRAM(S): 400 CAPSULE ORAL at 23:10

## 2025-05-01 RX ADMIN — Medication 4 MILLIGRAM(S): at 18:18

## 2025-05-01 RX ADMIN — ATORVASTATIN CALCIUM 40 MILLIGRAM(S): 80 TABLET, FILM COATED ORAL at 23:09

## 2025-05-01 RX ADMIN — CYCLOBENZAPRINE HYDROCHLORIDE 10 MILLIGRAM(S): 15 CAPSULE, EXTENDED RELEASE ORAL at 23:09

## 2025-05-01 RX ADMIN — Medication 100 MILLIGRAM(S): at 17:22

## 2025-05-01 RX ADMIN — CYCLOBENZAPRINE HYDROCHLORIDE 10 MILLIGRAM(S): 15 CAPSULE, EXTENDED RELEASE ORAL at 16:25

## 2025-05-01 NOTE — ED ADULT NURSE NOTE - CHIEF COMPLAINT QUOTE
c/o bumping left foot 5th toe on wall resulting in a laceration between 4th and 5th toe and 5t toe is standing upright with deformity. h/o paraplegia.

## 2025-05-01 NOTE — DISCHARGE NOTE PROVIDER - NSDCMRMEDTOKEN_GEN_ALL_CORE_FT
cyclobenzaprine 10 mg oral tablet: 1 tab(s) orally every 8 hours  DULoxetine 60 mg oral delayed release capsule: 1 cap(s) orally once a day  Eliquis 5 mg oral tablet: 1 tab(s) orally 2 times a day  ferrous sulfate 325 mg (65 mg elemental iron) oral tablet: 1 tab(s) orally once a day  LaMICtal 25 mg oral tablet: 1 tab(s) orally once a day  Lipitor 40 mg oral tablet: 1 tab(s) orally once a day  Neurontin 100 mg oral capsule: 1 cap(s) orally every 8 hours  oxyCODONE 10 mg oral tablet: 1 tab(s) orally every 6 hours as needed for  severe pain  Pepcid 20 mg oral tablet: 1 tab(s) orally once a day  Senna-gen 8.6 mg oral tablet: 1 tab(s) orally 2 times a day   amoxicillin-clavulanate 875 mg-125 mg oral tablet: 1 tab(s) orally 2 times a day  cyclobenzaprine 10 mg oral tablet: 1 tab(s) orally every 8 hours  DULoxetine 60 mg oral delayed release capsule: 1 cap(s) orally once a day  Eliquis 5 mg oral tablet: 1 tab(s) orally 2 times a day  ferrous sulfate 325 mg (65 mg elemental iron) oral tablet: 1 tab(s) orally once a day  LaMICtal 25 mg oral tablet: 1 tab(s) orally once a day  Lipitor 40 mg oral tablet: 1 tab(s) orally once a day  Neurontin 100 mg oral capsule: 1 cap(s) orally every 8 hours  oxyCODONE 10 mg oral tablet: 1 tab(s) orally every 6 hours as needed for  severe pain  Pepcid 20 mg oral tablet: 1 tab(s) orally once a day  Senna-gen 8.6 mg oral tablet: 1 tab(s) orally 2 times a day

## 2025-05-01 NOTE — BRIEF OPERATIVE NOTE - NSICDXBRIEFPOSTOP_GEN_ALL_CORE_FT
POST-OP DIAGNOSIS:  Fracture of proximal phalanx of toe of left foot 01-May-2025 20:20:54  Caitlin Guidry
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
## 2025-05-01 NOTE — ED PROVIDER NOTE - CARE PLAN
Principal Discharge DX:	Injury of toe on left foot   1 Principal Discharge DX:	Open fracture of bone  Secondary Diagnosis:	Laceration of toe, left
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
## 2025-05-01 NOTE — DISCHARGE NOTE PROVIDER - NSDCCPCAREPLAN_GEN_ALL_CORE_FT
PRINCIPAL DISCHARGE DIAGNOSIS  Diagnosis: Open fracture of bone  Assessment and Plan of Treatment: Discharge Instructions:  Post-Surgical Care: Follow-up with orthopedic or podiatry post-surgery for recovery and rehabilitation of the fractured toe. Adhere to surgical and post-operative care instructions provided by healthcare team.  Chronic Pain Management: Continue with prescribed pain regimen, monitoring for pain control efficacy and side effects. Schedule follow-up visits with pain management or primary care providers for ongoing assessment and adjustments as needed.  Paraplegia Care: Maintain frequent repositioning to prevent pressure ulcers and continue cyclobenzaprine for spasticity management. Engage with physical or occupational therapy as advised to support mobility and daily activities.  DVT Prophylaxis: Resume Eliquis tomorrow 24hours after procedure. Stop if any evidence of bleeding   Lipidemia Management: Continue Lipitor 40 mg as prescribed. Monitor lipid levels and attend follow-up appointments with primary care or cardiology to assess cardiovascular risk factors.  Family members or caregivers should assist in medication adherence, facilitate outpatient appointments for each condition, and provide support in the patient's daily care needs and post-operative recovery processes.        SECONDARY DISCHARGE DIAGNOSES  Diagnosis: Laceration of toe, left  Assessment and Plan of Treatment:

## 2025-05-01 NOTE — ED ADULT NURSE NOTE - NSFALLRISKINTERV_ED_ALL_ED

## 2025-05-01 NOTE — BRIEF OPERATIVE NOTE - COMMENTS
pt is s/p left foot 5th digit ORIF and washout, closed  Low concern for viability, cap refill < 3seconds at end of case  Podiatry plan: stable for discharge tomorrow, 5/2, pending appearance
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
## 2025-05-01 NOTE — DISCHARGE NOTE PROVIDER - NSDCFUADDAPPT_GEN_ALL_CORE_FT
Podiatry Discharge Instructions:  Follow up: Please follow up with Dr. Gray within 1 week of discharge from the hospital, please call [phone] for appointment and discuss that you recently were seen in the hospital.  Wound Care: Please leave your dressing clean dry intact until your follow up appointment   Antibiotics: Please continue as instructed. Podiatry Discharge Instructions:  Follow up: Please follow up with Dr. Gray within 1 week of discharge from the hospital, please call [phone] for appointment and discuss that you recently were seen in the hospital.  Wound Care: Please leave your dressing clean dry intact until your follow up appointment   Antibiotics: Please continue as instructed.    APPTS ARE READY TO BE MADE: [X] YES   Podiatry Discharge Instructions:  Follow up: Please follow up with Dr. Gray within 1 week of discharge from the hospital, please call [phone] for appointment and discuss that you recently were seen in the hospital.  Wound Care: Please leave your dressing clean dry intact until your follow up appointment   Antibiotics: Please continue as instructed.    APPTS ARE READY TO BE MADE: [X] YES    Patient is being transferred. Caregiver will arrange follow up.

## 2025-05-01 NOTE — DISCHARGE NOTE PROVIDER - NSDCFUSCHEDAPPT_GEN_ALL_CORE_FT
Eyad Renteria Physician [facility]  UROLOGY [address]  Scheduled Appointment: 05/08/2025

## 2025-05-01 NOTE — ED ADULT TRIAGE NOTE - HEIGHT IN CM
Please advise, thank you!  
Please make an appt for me to talk to mom- I will need 30 min. She is due for a well visit anyway so lets make this her well visit appt but with 30 min  
187.96

## 2025-05-01 NOTE — H&P ADULT - NSHPPHYSICALEXAM_GEN_ALL_CORE
GENERAL: NAD  HEAD: Atraumatic, Normocephalic  EYES: EOMI. Conjunctiva and sclera clear  NECK: Supple  CHEST/LUNG: Clear to auscultation bilaterally; No wheeze, rhonchi, rales  HEART: Regular rate and rhythm; No murmurs  ABDOMEN: Soft, Nontender, Nondistended; Bowel sounds present  EXTREMITIES: No edema. L foot dressed  NEURO: Spastic paraplegia

## 2025-05-01 NOTE — BRIEF OPERATIVE NOTE - NSICDXBRIEFPROCEDURE_GEN_ALL_CORE_FT
PROCEDURES:  Open reduction and internal fixation (ORIF) of injury of metatarsophalangeal (MTP) joint of left foot 01-May-2025 20:20:33  Caitlin Guidry

## 2025-05-01 NOTE — BRIEF OPERATIVE NOTE - OPERATION/FINDINGS
s/p left foot 5th digit ORIF and washout, closed  -0.045 k-wire stabilizing fracture  -Low concern for viability of digit  -Incision primarily closed with 3-0 nylon, 4-0 nylon, and 4-0 vicryl

## 2025-05-01 NOTE — ED PROVIDER NOTE - PHYSICAL EXAMINATION
CONSTITUTIONAL: A&O x3, NAD, resting comfortably, well groomed  HEAD: Normocephalic, atraumatic.   NECK:  Airway patent. Neck Supple.   RESPIRATORY: breathing unlabored.   PERIPHERAL VASCULAR: No edema of feet and ankles. . No calf tenderness. Pulses 2+ B/L.   MSK: Moving arms, paraplegic @ baseline,  cannot move or feel legs or feet. Left foot 5th toe deformity, laterally displaced, with 2cm x 1cm laceration between 4th and 5th toe, small amount of active bleeding @ this time.    SKIN: Warm, dry, color WNL, no turgor, erythema or rashes. Cap refill < 2 sec.  NEURO: A&Ox3, interactive, cooperative, no focal deficits.

## 2025-05-01 NOTE — CONSULT NOTE ADULT - ASSESSMENT
34M presents for left 5th digit injury  -Pt was seen and evaluated  -Afebrile, labs pending  -Left foot 5th digit laterally deviated with proximal phalanx exposure, unable to move digits 2/2 paraplegia, 4th interspace laceration with active sanguinous drainage  -Left foot X-ray: 5th proximal phalanx fracture with lateral angulation (resident read)  -After obtaining verbal consent, bedside closed reduction was attempted unsuccessfully. The wound was flushed and dressed with DSD. Pt tolerated well.  -Recommend admission  -Recommend Ancef  -Emergently added-on for left foot 5th digit ORIF and washout with Dr. Gray  -Pt to be NPO  -CBC, BMP, Coags, and Type and Screen  -Discussed with attending

## 2025-05-01 NOTE — H&P ADULT - PROBLEM SELECTOR PLAN 1
- P/w injury to L 5th toe  - Xray L foot = Fracture at the base of the proximal phalanx, distal segment laterally directed.  - S/p TDAP  - C/w Ancef  - Podiatry on board = NPO. For OR

## 2025-05-01 NOTE — DISCHARGE NOTE PROVIDER - NSDCCAREPROVSEEN_GEN_ALL_CORE_FT
James, Heavenly Guidry, Caitlin Monte, Savannah Montelongo, Nate Gomez, Raul Kaur, Eliza Gray, Jerrod Birmingham, Padmini

## 2025-05-01 NOTE — BRIEF OPERATIVE NOTE - NSICDXBRIEFPREOP_GEN_ALL_CORE_FT
PRE-OP DIAGNOSIS:  Fracture of proximal phalanx of toe of left foot 01-May-2025 20:20:42  Caitlin Guidry

## 2025-05-01 NOTE — ED PROVIDER NOTE - PROGRESS NOTE DETAILS
Supervising Statement (CHUY Tiwari): I have personally seen and examined this patient.  I have fully participated in the care of this patient. I have reviewed all pertinent clinical information, including history, physical exam, plan and the ACP Fellow's note and agree except as noted.    Open displaced fracture of L 5th toe, pending repeat imaging of full foot xray, only toes completed initially. No active bleeding, no sensation at baseline secondary to paraplegia, does not walk, pulses 2+. Podiatry aware of patient, labs, abx ordered, tetanus updated. Dispo pending recommendations from podiatry. CHUY Godinez: podiatry at bedside, patient has to go to OR, added on pre-op labs, made NPO, will admit to medicine, US IV being placed by AUSTIN ghosh to obtain labs, will call medicine once obtained to admit. Dr Farfan made aware of patient given need for admission. CHUY Godinez: spoke with Dr Medrano (medicine hospitalist), will admit and plan for OR. Nursing spoke with OR for report, admit order being placed now.

## 2025-05-01 NOTE — ED ADULT TRIAGE NOTE - CHIEF COMPLAINT QUOTE
c/o pumping left foot 5th toe on wall resulting in a laceration between 4th and 5th toe and 5t toe is standing upright with deformity. h/o paraplegia.

## 2025-05-01 NOTE — ED PROVIDER NOTE - ATTENDING CONTRIBUTION TO CARE
Patient evaluated and seen with AUSTIN Carranza agree with above history and physical - pt examined and seen by me personally - findings as seen: Patient otherwise noted to have laceration and fracture of the fifth toe proximal phalanx area otherwise evaluated by podiatry who recommends patient to have surgical repair.  Patient to be admitted to medicine service and otherwise will have surgery performed today.

## 2025-05-01 NOTE — ED PROVIDER NOTE - NS ED ATTENDING STATEMENT MOD
I have seen and examined this patient and fully participated in the care of this patient as the teaching attending.  The service was shared with the JE.  I reviewed and verified the documentation.

## 2025-05-01 NOTE — CONSULT NOTE ADULT - SUBJECTIVE AND OBJECTIVE BOX
Patient is a 34y old  Male who presents with a chief complaint of left foot 5th toe injury    HPI: Patient is 34-year-old male PMH, nonlabored sensation to B/L lower extremities baseline presents with complaint of injury to left foot toe, struck foot while getting out of the bathtub today with pain.   Patient with fifth toe deformity and laceration, patient denies 2/2 cannot feel the foot at baseline.  Patient denies other injuries or complaints. Patient unsure of last tetanus, will update      PAST MEDICAL & SURGICAL HISTORY:  Paraplegia      Colostomy in place      VTE (venous thromboembolism)      Colostomy present      S/P IVC filter          MEDICATIONS  (STANDING):  ceFAZolin   IVPB 1000 milliGRAM(s) IV Intermittent once  cyclobenzaprine 10 milliGRAM(s) Oral once    MEDICATIONS  (PRN):      Allergies    Lidocaine 4% Patch (Blisters)    Intolerances    lactose (Diarrhea)      VITALS:    Vital Signs Last 24 Hrs  T(C): 36.9 (01 May 2025 13:10), Max: 36.9 (01 May 2025 13:10)  T(F): 98.4 (01 May 2025 13:10), Max: 98.4 (01 May 2025 13:10)  HR: 97 (01 May 2025 13:10) (97 - 97)  BP: 100/63 (01 May 2025 13:10) (100/63 - 100/63)  BP(mean): --  RR: 20 (01 May 2025 13:10) (20 - 20)  SpO2: 97% (01 May 2025 13:10) (97% - 97%)    Parameters below as of 01 May 2025 13:10  Patient On (Oxygen Delivery Method): room air        LABS:                CAPILLARY BLOOD GLUCOSE              LOWER EXTREMITY PHYSICAL EXAM:    Vascular: DP/PT 2/4, B/L, CFT <3 seconds B/L, Temperature gradient cool to cool, B/L.   Neuro: Epicritic sensation diminished to the level of digits, B/L.  Musculoskeletal/Ortho: left foot 5th digit laterally deviated with proximal phalanx exposure, unable to move digits 2/2 paraplegia  Skin: 4th interspace laceration with active sanguinous drainage, 5th digit proximal phalanx exposure, no debris      RADIOLOGY & ADDITIONAL STUDIES:

## 2025-05-01 NOTE — ED PROVIDER NOTE - CLINICAL SUMMARY MEDICAL DECISION MAKING FREE TEXT BOX
JEREMIAH Plascencia NP Fellow: JEREMIAH Plascencia NP Fellow: Patient is 34-year-old male PMH, nonlabored sensation to B/L lower extremities baseline presents with complaint of injury to left foot toe, struck foot while getting out of the bathtub today with pain.   Patient with fifth toe deformity and laceration, patient denies 2/2 cannot feel the foot at baseline.  Patient denies other injuries or complaints. Patient unsure of last tetanus, will update  Patient is nontoxic-appearing.  PE as above pertinent for paraplegic @ baseline,  cannot move or feel legs or feet. Left foot 5th toe deformity, laterally displaced, with 2cm x 1cm laceration between 4th and 5th toe, small amount of active bleeding @ this time.    DDx concerning for but limited to open fracture versus dislocation.  Will order x-rays, and contact podiatry for recommendations.  Patient will likely be discharged home with podiatry follow-up.

## 2025-05-02 ENCOUNTER — TRANSCRIPTION ENCOUNTER (OUTPATIENT)
Age: 35
End: 2025-05-02

## 2025-05-02 VITALS
RESPIRATION RATE: 18 BRPM | HEART RATE: 90 BPM | TEMPERATURE: 98 F | SYSTOLIC BLOOD PRESSURE: 115 MMHG | DIASTOLIC BLOOD PRESSURE: 81 MMHG | OXYGEN SATURATION: 99 %

## 2025-05-02 LAB — CRP SERPL-MCNC: 9 MG/L — HIGH

## 2025-05-02 PROCEDURE — ZZZZZ: CPT

## 2025-05-02 RX ORDER — AMOXICILLIN AND CLAVULANATE POTASSIUM 500; 125 MG/1; MG/1
1 TABLET, FILM COATED ORAL
Qty: 14 | Refills: 0
Start: 2025-05-02 | End: 2025-05-08

## 2025-05-02 RX ADMIN — Medication 100 MILLIGRAM(S): at 06:51

## 2025-05-02 RX ADMIN — Medication 325 MILLIGRAM(S): at 12:02

## 2025-05-02 RX ADMIN — CYCLOBENZAPRINE HYDROCHLORIDE 10 MILLIGRAM(S): 15 CAPSULE, EXTENDED RELEASE ORAL at 05:56

## 2025-05-02 RX ADMIN — OXYCODONE HYDROCHLORIDE 10 MILLIGRAM(S): 30 TABLET ORAL at 05:54

## 2025-05-02 RX ADMIN — CYCLOBENZAPRINE HYDROCHLORIDE 10 MILLIGRAM(S): 15 CAPSULE, EXTENDED RELEASE ORAL at 13:37

## 2025-05-02 RX ADMIN — DULOXETINE 60 MILLIGRAM(S): 20 CAPSULE, DELAYED RELEASE ORAL at 13:37

## 2025-05-02 RX ADMIN — GABAPENTIN 100 MILLIGRAM(S): 400 CAPSULE ORAL at 13:38

## 2025-05-02 RX ADMIN — LAMOTRIGINE 25 MILLIGRAM(S): 150 TABLET ORAL at 13:37

## 2025-05-02 RX ADMIN — GABAPENTIN 100 MILLIGRAM(S): 400 CAPSULE ORAL at 05:54

## 2025-05-02 RX ADMIN — OXYCODONE HYDROCHLORIDE 10 MILLIGRAM(S): 30 TABLET ORAL at 06:54

## 2025-05-02 RX ADMIN — OXYCODONE HYDROCHLORIDE 10 MILLIGRAM(S): 30 TABLET ORAL at 00:12

## 2025-05-02 RX ADMIN — OXYCODONE HYDROCHLORIDE 10 MILLIGRAM(S): 30 TABLET ORAL at 12:02

## 2025-05-02 RX ADMIN — Medication 20 MILLIGRAM(S): at 12:02

## 2025-05-02 NOTE — DISCHARGE NOTE NURSING/CASE MANAGEMENT/SOCIAL WORK - NSDCVIVACCINE_GEN_ALL_CORE_FT
Tdap; 01-May-2025 14:12; Raymundo Contreras (ZAK); [employer]; [id number] (Exp. Date: 30-Apr-2026); IntraMuscular; Deltoid Left.; 0.5 milliLiter(s); VIS (VIS Published: 09-May-2013, VIS Presented: 01-May-2025);

## 2025-05-02 NOTE — PROGRESS NOTE ADULT - SUBJECTIVE AND OBJECTIVE BOX
Patient is a 34y old  Male who presents with a chief complaint of L 5th toe fracture (01 May 2025 20:18)       INTERVAL HPI/OVERNIGHT EVENTS:  Patient seen and evaluated at bedside.  Pt is resting comfortable in NAD. Denies N/V/F/C.    Allergies    Lidocaine 4% Patch (Blisters)    Intolerances    lactose (Diarrhea)      Vital Signs Last 24 Hrs  T(C): 36.4 (02 May 2025 08:47), Max: 36.9 (01 May 2025 13:10)  T(F): 97.6 (02 May 2025 08:47), Max: 98.4 (01 May 2025 13:10)  HR: 88 (02 May 2025 08:47) (73 - 97)  BP: 117/82 (02 May 2025 08:47) (89/47 - 177/111)  BP(mean): 73 (01 May 2025 20:56) (73 - 73)  RR: 18 (02 May 2025 08:47) (12 - 20)  SpO2: 98% (02 May 2025 08:47) (97% - 100%)    Parameters below as of 02 May 2025 08:47  Patient On (Oxygen Delivery Method): room air        LABS:                        12.1   8.99  )-----------( 380      ( 01 May 2025 16:20 )             37.0           PT/INR - ( 01 May 2025 16:44 )   PT: 12.6 sec;   INR: 1.09 ratio         PTT - ( 01 May 2025 16:44 )  PTT:38.6 sec    CAPILLARY BLOOD GLUCOSE          Lower Extremity Physical Exam:    Vascular: DP/PT 2/4, B/L, CFT <3 seconds B/L, Temperature gradient warm to cool, B/L.   Neuro: Epicritic sensation diminished to the level of digits, B/L.  Musculoskeletal/Ortho: left foot 5th digit laterally deviated with proximal phalanx exposure, unable to move digits 2/2 paraplegia  Skin: 5/1 s/p left foot 5th digit ORIF, closed: sutures inact, pin intact, skin flaps warm, no hematoma, no drainage.     RADIOLOGY & ADDITIONAL TESTS:

## 2025-05-02 NOTE — DISCHARGE NOTE NURSING/CASE MANAGEMENT/SOCIAL WORK - NSDCFUADDAPPT_GEN_ALL_CORE_FT
Podiatry Discharge Instructions:  Follow up: Please follow up with Dr. Gray within 1 week of discharge from the hospital, please call [phone] for appointment and discuss that you recently were seen in the hospital.  Wound Care: Please leave your dressing clean dry intact until your follow up appointment   Antibiotics: Please continue as instructed.

## 2025-05-02 NOTE — DISCHARGE NOTE NURSING/CASE MANAGEMENT/SOCIAL WORK - FINANCIAL ASSISTANCE
[facility] provides services at a reduced cost to those who are determined to be eligible through [facility]’s financial assistance program. Information regarding [facility]’s financial assistance program can be found by going to [email or web address] or by calling [phone].

## 2025-05-02 NOTE — DISCHARGE NOTE NURSING/CASE MANAGEMENT/SOCIAL WORK - PATIENT PORTAL LINK FT
You can access the FollowMyHealth Patient Portal offered by [facility] by registering at the following website: [email or web address]. By joining [facility]’s FollowMyHealth portal, you will also be able to view your health information using other applications (apps) compatible with our system.

## 2025-05-02 NOTE — PATIENT PROFILE ADULT - FALL HARM RISK - DEVICES
The patient has been re-examined and I agree with the above assessment or I updated with my findings.
Wheelchair

## 2025-05-02 NOTE — PROGRESS NOTE ADULT - ASSESSMENT
34M presents s/p left foot 5th digit ORIF, closed (DOS 5/1)  - Patient seen and evaluated  - Afebrile, no leukocytosis  - 5/1 s/p left foot 5th digit ORIF, closed: sutures inact, pin intact, skin flaps warm, no hematoma, no drainage.   - Intraop findings: Low concern for viability, cap refill < 3seconds at end of case  - Patient is stable for discharge from podiatry standpoint TODAY  - Wound care and follow up information can be found in discharge provider note  - Discussed with attending

## 2025-05-02 NOTE — PATIENT PROFILE ADULT - FALL HARM RISK - HARM RISK INTERVENTIONS

## 2025-05-06 DIAGNOSIS — S92.501A DISPLACED UNSPECIFIED FRACTURE OF RIGHT LESSER TOE(S), INITIAL ENCOUNTER FOR CLOSED FRACTURE: ICD-10-CM

## 2025-05-06 DIAGNOSIS — S92.512B DISPLACED FRACTURE OF PROXIMAL PHALANX OF LEFT LESSER TOE(S), INITIAL ENCOUNTER FOR OPEN FRACTURE: ICD-10-CM

## 2025-05-06 DIAGNOSIS — Z79.01 LONG TERM (CURRENT) USE OF ANTICOAGULANTS: ICD-10-CM

## 2025-05-06 DIAGNOSIS — E78.5 HYPERLIPIDEMIA, UNSPECIFIED: ICD-10-CM

## 2025-05-06 DIAGNOSIS — Z86.718 PERSONAL HISTORY OF OTHER VENOUS THROMBOSIS AND EMBOLISM: ICD-10-CM

## 2025-05-06 DIAGNOSIS — S21.239S: ICD-10-CM

## 2025-05-06 DIAGNOSIS — Z93.3 COLOSTOMY STATUS: ICD-10-CM

## 2025-05-06 DIAGNOSIS — G89.29 OTHER CHRONIC PAIN: ICD-10-CM
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
## 2025-05-06 DIAGNOSIS — G82.20 PARAPLEGIA, UNSPECIFIED: ICD-10-CM
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
## 2025-05-06 DIAGNOSIS — Z88.4 ALLERGY STATUS TO ANESTHETIC AGENT: ICD-10-CM

## 2025-05-09 ENCOUNTER — APPOINTMENT (OUTPATIENT)
Dept: UROLOGY | Facility: CLINIC | Age: 35
End: 2025-05-09

## 2025-05-15 ENCOUNTER — APPOINTMENT (OUTPATIENT)
Dept: UROLOGY | Facility: CLINIC | Age: 35
End: 2025-05-15
Payer: MEDICAID

## 2025-05-15 VITALS — DIASTOLIC BLOOD PRESSURE: 58 MMHG | SYSTOLIC BLOOD PRESSURE: 88 MMHG | OXYGEN SATURATION: 96 % | HEART RATE: 101 BPM

## 2025-05-15 DIAGNOSIS — Z86.39 PERSONAL HISTORY OF OTHER ENDOCRINE, NUTRITIONAL AND METABOLIC DISEASE: ICD-10-CM

## 2025-05-15 DIAGNOSIS — R33.9 RETENTION OF URINE, UNSPECIFIED: ICD-10-CM

## 2025-05-15 DIAGNOSIS — G82.20 PARAPLEGIA, UNSPECIFIED: ICD-10-CM

## 2025-05-15 DIAGNOSIS — Z86.59 PERSONAL HISTORY OF OTHER MENTAL AND BEHAVIORAL DISORDERS: ICD-10-CM

## 2025-05-15 DIAGNOSIS — Z72.820 SLEEP DEPRIVATION: ICD-10-CM

## 2025-05-15 DIAGNOSIS — R82.81 PYURIA: ICD-10-CM

## 2025-05-15 DIAGNOSIS — S99.922A UNSPECIFIED INJURY OF LEFT FOOT, INITIAL ENCOUNTER: ICD-10-CM

## 2025-05-15 DIAGNOSIS — I82.90 ACUTE EMBOLISM AND THROMBOSIS OF UNSPECIFIED VEIN: ICD-10-CM

## 2025-05-15 DIAGNOSIS — F41.1 GENERALIZED ANXIETY DISORDER: ICD-10-CM

## 2025-05-15 DIAGNOSIS — Z86.69 PERSONAL HISTORY OF OTHER DISEASES OF THE NERVOUS SYSTEM AND SENSE ORGANS: ICD-10-CM

## 2025-05-15 DIAGNOSIS — Z93.59 OTHER CYSTOSTOMY STATUS: ICD-10-CM

## 2025-05-15 DIAGNOSIS — N31.2 FLACCID NEUROPATHIC BLADDER, NOT ELSEWHERE CLASSIFIED: ICD-10-CM

## 2025-05-15 DIAGNOSIS — Z87.19 PERSONAL HISTORY OF OTHER DISEASES OF THE DIGESTIVE SYSTEM: ICD-10-CM

## 2025-05-15 DIAGNOSIS — R52 PAIN, UNSPECIFIED: ICD-10-CM

## 2025-05-15 PROCEDURE — 51710 CHANGE OF BLADDER TUBE: CPT

## 2025-05-15 PROCEDURE — 99214 OFFICE O/P EST MOD 30 MIN: CPT | Mod: 25

## 2025-05-15 RX ORDER — AMOXICILLIN 875 MG/1
875 TABLET, FILM COATED ORAL
Refills: 0 | Status: ACTIVE | COMMUNITY

## 2025-06-05 ENCOUNTER — APPOINTMENT (OUTPATIENT)
Dept: UROLOGY | Facility: CLINIC | Age: 35
End: 2025-06-05
Payer: MEDICAID

## 2025-06-05 VITALS
DIASTOLIC BLOOD PRESSURE: 57 MMHG | WEIGHT: 268 LBS | HEART RATE: 107 BPM | BODY MASS INDEX: 34.41 KG/M2 | SYSTOLIC BLOOD PRESSURE: 100 MMHG | TEMPERATURE: 97.8 F | OXYGEN SATURATION: 100 %

## 2025-06-05 DIAGNOSIS — R33.9 RETENTION OF URINE, UNSPECIFIED: ICD-10-CM

## 2025-06-05 DIAGNOSIS — Z93.59 OTHER CYSTOSTOMY STATUS: ICD-10-CM

## 2025-06-05 DIAGNOSIS — R82.81 PYURIA: ICD-10-CM

## 2025-06-05 DIAGNOSIS — N31.2 FLACCID NEUROPATHIC BLADDER, NOT ELSEWHERE CLASSIFIED: ICD-10-CM

## 2025-06-05 PROCEDURE — 51710 CHANGE OF BLADDER TUBE: CPT

## 2025-06-26 ENCOUNTER — APPOINTMENT (OUTPATIENT)
Dept: UROLOGY | Facility: CLINIC | Age: 35
End: 2025-06-26
Payer: MEDICAID

## 2025-06-26 VITALS
HEART RATE: 90 BPM | SYSTOLIC BLOOD PRESSURE: 115 MMHG | WEIGHT: 268 LBS | BODY MASS INDEX: 34.39 KG/M2 | TEMPERATURE: 90 F | HEIGHT: 74 IN | DIASTOLIC BLOOD PRESSURE: 69 MMHG | OXYGEN SATURATION: 97 %

## 2025-06-26 PROCEDURE — 51710 CHANGE OF BLADDER TUBE: CPT

## 2025-07-14 NOTE — DISCHARGE NOTE PROVIDER - NSTOBACCOUSAGEY/N_GEN_A_CS
Lab Results   Component Value Date    HGBA1C 6.6 (H) 06/18/2025       Recent Labs     07/13/25  1049 07/13/25  1625 07/13/25 2014 07/14/25  0736   POCGLU 216* 273* 279* 142*       Blood Sugar Average: Last 72 hrs:  (P) [id number]: Glipizide 10mg BID, Linagliptin 5mg daily (listed as not taking), Metformin 500mg at dinner   Here: Lantus 12 units QHS, Lispro 4 units TID with meals, CDI/Accuchecks.  Will need to transition to or optimize home meds, or else patient will need to diabetes kit/education.  Management as per IM.      No

## 2025-07-17 ENCOUNTER — APPOINTMENT (OUTPATIENT)
Dept: UROLOGY | Facility: CLINIC | Age: 35
End: 2025-07-17
Payer: MEDICAID

## 2025-07-17 VITALS — HEART RATE: 106 BPM | OXYGEN SATURATION: 98 % | SYSTOLIC BLOOD PRESSURE: 97 MMHG | DIASTOLIC BLOOD PRESSURE: 64 MMHG

## 2025-07-17 PROCEDURE — 51710 CHANGE OF BLADDER TUBE: CPT

## 2025-08-07 ENCOUNTER — APPOINTMENT (OUTPATIENT)
Dept: UROLOGY | Facility: CLINIC | Age: 35
End: 2025-08-07
Payer: MEDICAID

## 2025-08-07 VITALS — HEART RATE: 87 BPM | OXYGEN SATURATION: 98 % | SYSTOLIC BLOOD PRESSURE: 112 MMHG | DIASTOLIC BLOOD PRESSURE: 75 MMHG

## 2025-08-07 DIAGNOSIS — R82.81 PYURIA: ICD-10-CM

## 2025-08-07 DIAGNOSIS — N31.2 FLACCID NEUROPATHIC BLADDER, NOT ELSEWHERE CLASSIFIED: ICD-10-CM

## 2025-08-07 DIAGNOSIS — Z93.59 OTHER CYSTOSTOMY STATUS: ICD-10-CM

## 2025-08-07 DIAGNOSIS — G82.20 PARAPLEGIA, UNSPECIFIED: ICD-10-CM

## 2025-08-07 PROCEDURE — 51710 CHANGE OF BLADDER TUBE: CPT

## 2025-09-04 ENCOUNTER — APPOINTMENT (OUTPATIENT)
Dept: UROLOGY | Facility: CLINIC | Age: 35
End: 2025-09-04
Payer: MEDICAID

## 2025-09-04 VITALS
OXYGEN SATURATION: 96 % | BODY MASS INDEX: 34.41 KG/M2 | SYSTOLIC BLOOD PRESSURE: 120 MMHG | TEMPERATURE: 97.8 F | DIASTOLIC BLOOD PRESSURE: 79 MMHG | WEIGHT: 268 LBS | HEART RATE: 105 BPM

## 2025-09-04 DIAGNOSIS — Z93.59 OTHER CYSTOSTOMY STATUS: ICD-10-CM

## 2025-09-04 DIAGNOSIS — G82.20 PARAPLEGIA, UNSPECIFIED: ICD-10-CM

## 2025-09-04 DIAGNOSIS — R33.9 RETENTION OF URINE, UNSPECIFIED: ICD-10-CM

## 2025-09-04 DIAGNOSIS — N31.2 FLACCID NEUROPATHIC BLADDER, NOT ELSEWHERE CLASSIFIED: ICD-10-CM

## 2025-09-04 PROCEDURE — 99214 OFFICE O/P EST MOD 30 MIN: CPT | Mod: 25

## 2025-09-04 PROCEDURE — 51102 DRAIN BL W/CATH INSERTION: CPT

## (undated) DEVICE — SOL IRR BAG NS 0.9% 3000ML

## (undated) DEVICE — DRAPE U (BLUE) 60 X 72"

## (undated) DEVICE — DRSG KERLIX ROLL LG 4.5"

## (undated) DEVICE — DRSG STOCKINETTE CLOTH 6 X 60"

## (undated) DEVICE — DRAPE EXTREMITY 87" X 128.5"

## (undated) DEVICE — ELCTR GROUNDING PAD ADULT COVIDIEN

## (undated) DEVICE — WARMING BLANKET UPPER ADULT

## (undated) DEVICE — PACKING GAUZE IODOFORM 0.5"

## (undated) DEVICE — SOL IRR POUR NS 0.9% 1500ML

## (undated) DEVICE — DRAPE 1/2 SHEET 40X57"

## (undated) DEVICE — BLADE SURGICAL #10 STAINLESS

## (undated) DEVICE — DRSG CURITY GAUZE SPONGE 4 X 4" 12-PLY

## (undated) DEVICE — CULTURETTE DUAL SWAB ST

## (undated) DEVICE — S&N VERSAJET II EXACT HANDPIECE 8MM X 45 DEGREE

## (undated) DEVICE — VENODYNE/SCD SLEEVE CALF MEDIUM

## (undated) DEVICE — PREP BETADINE KIT

## (undated) DEVICE — DRSG COMBINE 5 X 9"

## (undated) DEVICE — GOWN XL

## (undated) DEVICE — DRAPE LIGHT HANDLE COVER (BLUE)

## (undated) DEVICE — DRAPE TOWEL BLUE 17" X 24"

## (undated) DEVICE — PACK MINOR NO DRAPE

## (undated) DEVICE — FRA-ESU BOVIE FORCE TRIAD T6D04777DX: Type: DURABLE MEDICAL EQUIPMENT

## (undated) DEVICE — DRSG GAUZE VASELINE PETROLEUM 3 X 9"

## (undated) DEVICE — Device